# Patient Record
Sex: FEMALE | Race: WHITE | NOT HISPANIC OR LATINO | Employment: PART TIME | URBAN - METROPOLITAN AREA
[De-identification: names, ages, dates, MRNs, and addresses within clinical notes are randomized per-mention and may not be internally consistent; named-entity substitution may affect disease eponyms.]

---

## 2018-06-19 LAB
ANION GAP SERPL CALCULATED.3IONS-SCNC: 11.1 MM/L
BUN SERPL-MCNC: 17 MG/DL (ref 7–25)
CALCIUM SERPL-MCNC: 9.1 MG/DL (ref 8.6–10.5)
CHLORIDE SERPL-SCNC: 105 MM/L (ref 98–107)
CHOLEST SERPL-MCNC: 232 MG/DL (ref 0–200)
CO2 SERPL-SCNC: 28 MM/L (ref 21–31)
CREAT SERPL-MCNC: 0.82 MG/DL (ref 0.6–1.2)
EGFR (HISTORICAL): > 60 GFR
EGFR AFRICAN AMERICAN (HISTORICAL): > 60 GFR
GLUCOSE (HISTORICAL): 101 MG/DL (ref 65–99)
HDLC SERPL-MCNC: 65 MG/DL (ref 40–60)
LDLC SERPL CALC-MCNC: 151.6 MG/DL (ref 75–193)
OSMOLALITY, SERUM (HISTORICAL): 281 MOSM (ref 262–291)
POTASSIUM SERPL-SCNC: 4.1 MM/L (ref 3.5–5.5)
SODIUM SERPL-SCNC: 140 MM/L (ref 134–143)
TRIGL SERPL-MCNC: 75 MG/DL (ref 44–166)
TSH SERPL DL<=0.05 MIU/L-ACNC: 2.03 UIU/M (ref 0.45–5.33)
VLDL CHOLESTEROL (HISTORICAL): 15 MG/DL (ref 5–51)

## 2018-07-13 PROBLEM — N15.9 CHRONIC KIDNEY INFECTION: Status: ACTIVE | Noted: 2018-07-13

## 2018-07-13 PROBLEM — R01.1 HEART MURMUR: Status: ACTIVE | Noted: 2018-07-13

## 2018-07-13 PROBLEM — F32.A MILD DEPRESSION: Status: ACTIVE | Noted: 2018-07-13

## 2018-07-13 RX ORDER — LEVOTHYROXINE SODIUM 0.05 MG/1
50 TABLET ORAL DAILY
COMMUNITY
End: 2018-07-30 | Stop reason: SDUPTHER

## 2018-07-13 RX ORDER — LISINOPRIL 5 MG/1
10 TABLET ORAL DAILY
COMMUNITY
End: 2018-07-30 | Stop reason: SDUPTHER

## 2018-07-30 ENCOUNTER — OFFICE VISIT (OUTPATIENT)
Dept: FAMILY MEDICINE CLINIC | Facility: CLINIC | Age: 58
End: 2018-07-30
Payer: COMMERCIAL

## 2018-07-30 VITALS
SYSTOLIC BLOOD PRESSURE: 124 MMHG | HEART RATE: 77 BPM | BODY MASS INDEX: 27.96 KG/M2 | OXYGEN SATURATION: 98 % | TEMPERATURE: 97.5 F | HEIGHT: 63 IN | DIASTOLIC BLOOD PRESSURE: 86 MMHG | WEIGHT: 157.8 LBS | RESPIRATION RATE: 16 BRPM

## 2018-07-30 DIAGNOSIS — E78.5 HYPERLIPIDEMIA, UNSPECIFIED HYPERLIPIDEMIA TYPE: ICD-10-CM

## 2018-07-30 DIAGNOSIS — I10 HYPERTENSION, UNSPECIFIED TYPE: Primary | ICD-10-CM

## 2018-07-30 DIAGNOSIS — E03.9 HYPOTHYROIDISM, UNSPECIFIED TYPE: ICD-10-CM

## 2018-07-30 PROBLEM — R01.1 HEART MURMUR: Status: RESOLVED | Noted: 2018-07-13 | Resolved: 2018-07-30

## 2018-07-30 PROBLEM — N15.9 CHRONIC KIDNEY INFECTION: Status: RESOLVED | Noted: 2018-07-13 | Resolved: 2018-07-30

## 2018-07-30 PROBLEM — F32.A MILD DEPRESSION: Status: RESOLVED | Noted: 2018-07-13 | Resolved: 2018-07-30

## 2018-07-30 PROBLEM — E03.8 OTHER SPECIFIED HYPOTHYROIDISM: Status: ACTIVE | Noted: 2018-07-30

## 2018-07-30 PROCEDURE — 3074F SYST BP LT 130 MM HG: CPT | Performed by: INTERNAL MEDICINE

## 2018-07-30 PROCEDURE — 99213 OFFICE O/P EST LOW 20 MIN: CPT | Performed by: INTERNAL MEDICINE

## 2018-07-30 PROCEDURE — 3079F DIAST BP 80-89 MM HG: CPT | Performed by: INTERNAL MEDICINE

## 2018-07-30 PROCEDURE — 3008F BODY MASS INDEX DOCD: CPT | Performed by: INTERNAL MEDICINE

## 2018-07-30 RX ORDER — LISINOPRIL 10 MG/1
10 TABLET ORAL DAILY
Qty: 90 TABLET | Refills: 3 | Status: SHIPPED | OUTPATIENT
Start: 2018-07-30 | End: 2019-10-14 | Stop reason: SDUPTHER

## 2018-07-30 RX ORDER — LEVOTHYROXINE SODIUM 0.05 MG/1
50 TABLET ORAL DAILY
Qty: 90 TABLET | Refills: 3 | Status: SHIPPED | OUTPATIENT
Start: 2018-07-30 | End: 2019-10-14 | Stop reason: SDUPTHER

## 2018-07-30 NOTE — PROGRESS NOTES
Assessment/Plan:  Hypertension   Stable  Hyperlipidemia  On diet       Diagnoses and all orders for this visit:    Hypertension, unspecified type  -     lisinopril (ZESTRIL) 10 mg tablet; Take 1 tablet (10 mg total) by mouth daily  -     Basic metabolic panel; Future    Hypothyroidism, unspecified type  -     levothyroxine 50 mcg tablet; Take 1 tablet (50 mcg total) by mouth daily  -     TSH, 3rd generation with Free T4 reflex; Future    Hyperlipidemia, unspecified hyperlipidemia type  -     Lipid panel; Future          Subjective:      Patient ID: Suzan Clifton is a 59 y o  female  HPI    The following portions of the patient's history were reviewed and updated as appropriate: She  has a past medical history of Depression and Murmur  Patient Active Problem List    Diagnosis Date Noted    Essential hypertension 07/30/2018    Other specified hypothyroidism 07/30/2018     She  has a past surgical history that includes Tonsillectomy and Appendectomy  Her family history includes Diabetes in her mother; Heart disease in her father and mother  She  reports that she quit smoking about 30 years ago  She has never used smokeless tobacco  She reports that she does not drink alcohol or use drugs  Current Outpatient Prescriptions   Medication Sig Dispense Refill    levothyroxine 50 mcg tablet Take 1 tablet (50 mcg total) by mouth daily 90 tablet 3    lisinopril (ZESTRIL) 10 mg tablet Take 1 tablet (10 mg total) by mouth daily 90 tablet 3     No current facility-administered medications for this visit  Current Outpatient Prescriptions on File Prior to Visit   Medication Sig    [DISCONTINUED] levothyroxine 50 mcg tablet Take 50 mcg by mouth daily    [DISCONTINUED] lisinopril (ZESTRIL) 5 mg tablet Take 10 mg by mouth daily       No current facility-administered medications on file prior to visit  She has No Known Allergies       Review of Systems   Constitutional: Negative  HENT: Negative  Eyes: Negative  Respiratory: Negative  Cardiovascular: Negative  Gastrointestinal: Negative  Endocrine: Negative  Genitourinary: Negative  Musculoskeletal: Negative  Skin: Negative  Allergic/Immunologic: Negative  Neurological: Negative  Hematological: Negative  Psychiatric/Behavioral: Negative  Objective:      /86   Pulse 77   Temp 97 5 °F (36 4 °C) (Tympanic)   Resp 16   Ht 5' 3" (1 6 m)   Wt 71 6 kg (157 lb 12 8 oz)   SpO2 98%   BMI 27 95 kg/m²          Physical Exam   Constitutional: She is oriented to person, place, and time  She appears well-developed and well-nourished  HENT:   Head: Normocephalic and atraumatic  Eyes: Pupils are equal, round, and reactive to light  Neck: Normal range of motion  Neck supple  No JVD present  No thyromegaly present  Cardiovascular: Normal rate, regular rhythm and normal heart sounds  Exam reveals no friction rub  No murmur heard  Pulmonary/Chest: Effort normal and breath sounds normal  No respiratory distress  She has no wheezes  She has no rales  Abdominal: Soft  Bowel sounds are normal  She exhibits no mass  There is no tenderness  There is no rebound  Musculoskeletal: Normal range of motion  She exhibits no edema or tenderness  Lymphadenopathy:     She has no cervical adenopathy  Neurological: She is alert and oriented to person, place, and time  She has normal reflexes  Skin: Skin is warm and dry  Psychiatric: She has a normal mood and affect  Orders Only on 06/19/2018   Component Date Value Ref Range Status    Glucose 06/19/2018 101* 65 - 99 MG/DL Final    Comment: ADA fasting glucose recommendations:   Normal: 65-99; Impaired: 100-125;  Diagnostic for Diabetes mellitus: > 125; Target for Diabetes mellitus:       BUN 06/19/2018 17  7 - 25 MG/DL Final    Creatinine 06/19/2018 0 82  0 6 - 1 2 MG/DL Final    Comment: IDMS method performed  The drugs Metamizole, Sulfasalazine, and Sulfapyridine may interfere and  result in false low results   Sodium 06/19/2018 140  134 - 143 MM/L Final    Please note: Reference range change based on patient population   Potassium 06/19/2018 4 1  3 5 - 5 5 MM/L Final    Please note: Reference range change based on patient population   Chloride 06/19/2018 105  98 - 107 MM/L Final    CO2 06/19/2018 28  21 0 - 31 0 MM/L Final    Calcium 06/19/2018 9 1  8 6 - 10 5 MG/DL Final    Please note: Reference range change based on patient population   Anion Gap 06/19/2018 11 1  MM/L Final    OSMOLALITY, SERUM 06/19/2018 281  262 - 291 mOsm Final    EGFR (HISTORICAL) 06/19/2018 > 60  >60 GFR Final    Comment: This calculation follows the MDRD guidelines  Units are in mL/min/1 73 sq meters      eGFR African American 06/19/2018 > 60  >60 GFR Final    Cholesterol 06/19/2018 232* 0 - 200 MG/DL Final    Comment: <200----------------------------DESIRABLE  200 - 239---------------------BORDERLINE HIGH  240 OR GREATER-----HIGH  The drugs Metamizole, Sulfasalazine, and Sulfapyridine may interfere and  result in false low results   Triglycerides 06/19/2018 75  44 - 166 MG/DL Final    Comment: The drugs Metamizole, Sulfasalazine, and Sulfapyridine may interfere and  result in false low results   VLDL CHOLESTEROL 06/19/2018 15 0  5 - 51 MG/DL Final    HDL 06/19/2018 65* 40 - 60 MG/DL Final    Comment: The drugs Metamizole, Sulfasalazine, and Sulfapyridine may interfere and  result in false low results   LDL Calculated 06/19/2018 151 6  75 - 193 MG/DL Final    TSH 3RD GENERATON 06/19/2018 2 03  0 45 - 5 33 UIU/M Final    Comment: Pregnant Females Reference Range  1st Trimester: 0 05-3 70  2nd Trimester: 0 31-4 35  3rd Trimester: 0 41-5 18         No results found

## 2018-10-08 ENCOUNTER — ANNUAL EXAM (OUTPATIENT)
Dept: OBGYN CLINIC | Facility: CLINIC | Age: 58
End: 2018-10-08
Payer: COMMERCIAL

## 2018-10-08 VITALS
WEIGHT: 157.4 LBS | SYSTOLIC BLOOD PRESSURE: 120 MMHG | DIASTOLIC BLOOD PRESSURE: 84 MMHG | BODY MASS INDEX: 27.89 KG/M2 | HEIGHT: 63 IN

## 2018-10-08 DIAGNOSIS — Z01.411 ENCNTR FOR GYN EXAM (GENERAL) (ROUTINE) W ABNORMAL FINDINGS: Primary | ICD-10-CM

## 2018-10-08 DIAGNOSIS — Z12.31 ENCOUNTER FOR SCREENING MAMMOGRAM FOR BREAST CANCER: ICD-10-CM

## 2018-10-08 PROCEDURE — 99396 PREV VISIT EST AGE 40-64: CPT | Performed by: OBSTETRICS & GYNECOLOGY

## 2018-10-08 NOTE — PATIENT INSTRUCTIONS
Breast Self Exam for Women   WHAT YOU NEED TO KNOW:   A BSE is a way to check your breasts for lumps and other changes  Regular BSEs can help you know how your breasts normally look and feel  Most breast lumps or changes are not cancer, but you should always have them checked by a healthcare provider  Your healthcare provider can also watch you do a BSE and can tell you if you are doing your BSE correctly  DISCHARGE INSTRUCTIONS:   Contact your healthcare provider if:   · You find any lumps or changes in your breasts  · You have breast pain or fluid coming from your nipples  · You have questions or concerns about your condition or care  Why you should do a BSE:  Breast cancer is the most common type of cancer in women  Even if you have mammograms, you may still want to do a BSE regularly  If you know how your breasts normally feel and look, it may help you know when to contact your healthcare provider  Mammograms can miss some cancers  You may find a lump during a BSE that did not show up on your mammogram   When you should do a BSE:  Lizy Mays your calendar to help you remember to do BSE on a regular schedule  One easy way to remember to do a BSE is to do the exam on the same day of each month  If you have periods, you may want to do your BSE 1 week after your period ends  This is the time when your breasts may be the least swollen, lumpy, or tender  You can do regular BSEs even if you are breastfeeding or have breast implants  How to do a BSE:   · Look at your breasts in a mirror  Look at the size and shape of each breast and nipple  Check for swelling, lumps, dimpling, scaly skin, or other skin changes  Look for nipple changes, such as a nipple that is painful or beginning to pull inward  Gently squeeze both nipples and check to see if fluid (that is not breast milk) comes out of them  If you find any of these or other breast changes, contact your healthcare provider   Check your breasts while you sit or  the following 3 positions:    [facility] your arms down at your sides  ¨ Raise your hands and join them behind your head  ¨ Put firm pressure with your hands on your hips  Bend slightly forward while you look at your breasts in the mirror  · Lie down and feel your breasts  When you lie down, your breast tissue spreads out evenly over your chest  This makes it easier for you to feel for lumps and anything that may not be normal for your breasts  Do a BSE on one breast at a time  ¨ Place a small pillow or towel under your left shoulder  Put your left arm behind your head  ¨ Use the 3 middle fingers of your right hand  Use your fingertip pads, on the top of your fingers  Your fingertip pad is the most sensitive part of your finger  ¨ Use small circles to feel your breast tissue  Use your fingertip pads to make dime-sized, overlapping circles on your breast and armpits  Use light, medium, and firm pressure  First, press lightly  Second, press with medium pressure to feel a little deeper into the breast  Last, use firm pressure to feel deep within your breast     ¨ Examine your entire breast area  Examine the breast area from above the breast to below the breast where you feel only ribs  Make small circles with your fingertips, starting in the middle of your armpit  Make circles going up and down the breast area  Continue toward your breast and all the way across it  Examine the area from your armpit all the way over to the middle of your chest (breastbone)  Stop at the middle of your chest     ¨ Move the pillow or towel to your right shoulder, and put your right arm behind your head  Use the 3 fingertip pads of your left hand, and repeat the above steps to do a BSE on your right breast        What else you can do to check for breast problems or cancer:  Some experts suggest that women 36years of age or older should have a mammogram every year   Other experts suggest that women between the ages of 48and 76years old should have a mammogram every 2 years  Talk to your healthcare provider about when you should have a mammogram   Follow up with your healthcare provider as directed: Your healthcare provider can watch you and tell you if you are doing your BSE correctly  Write down your questions so you remember to ask them during your visits  © 2017 [employer] Information is for End User's use only and may not be sold, redistributed or otherwise used for commercial purposes  All illustrations and images included in CareNotes® are the copyrighted property of [employer]  or [employer]  The above information is an  only  It is not intended as medical advice for individual conditions or treatments  Talk to your doctor, nurse or pharmacist before following any medical regimen to see if it is safe and effective for you  Wellness Visit for Adults   AMBULATORY CARE:   A wellness visit  is when you see your healthcare provider to get screened for health problems  You can also get advice on how to stay healthy  Write down your questions so you remember to ask them  Ask your healthcare provider how often you should have a wellness visit  What happens at a wellness visit:  Your healthcare provider will ask about your health, and your family history of health problems  This includes high blood pressure, heart disease, and cancer  He or she will ask if you have symptoms that concern you, if you smoke, and about your mood  You may also be asked about your intake of medicines, supplements, food, and alcohol  Any of the following may be done:  · Your weight  will be checked  Your height may also be checked so your body mass index (BMI) can be calculated  Your BMI shows if you are at a healthy weight  · Your blood pressure  and heart rate will be checked  Your temperature may also be checked  · Blood and urine tests  may be done   Blood tests may be done to check your cholesterol levels  Abnormal cholesterol levels increase your risk for heart disease and stroke  You may also need a blood or urine test to check for diabetes if you are at increased risk  Urine tests may be done to look for signs of an infection or kidney disease  · A physical exam  includes checking your heartbeat and lungs with a stethoscope  Your healthcare provider may also check your skin to look for sun damage  · Screening tests  may be recommended  A screening test is done to check for diseases that may not cause symptoms  The screening tests you may need depend on your age, gender, family history, and lifestyle habits  For example, colorectal screening may be recommended if you are 48years old or older  Screening tests you need if you are a woman:   · A Pap smear  is used to screen for cervical cancer  Pap smears are usually done every 3 to 5 years depending on your age  You may need them more often if you have had abnormal Pap smear test results in the past  Ask your healthcare provider how often you should have a Pap smear  · A mammogram  is an x-ray of your breasts to screen for breast cancer  Experts recommend mammograms every 2 years starting at age 48 years  You may need a mammogram at age 52 years or younger if you have an increased risk for breast cancer  Talk to your healthcare provider about when you should start having mammograms and how often you need them  Vaccines you may need:   · Get an influenza vaccine  every year  The influenza vaccine protects you from the flu  Several types of viruses cause the flu  The viruses change over time, so new vaccines are made each year  · Get a tetanus-diphtheria (Td) booster vaccine  every 10 years  This vaccine protects you against tetanus and diphtheria  Tetanus is a severe infection that may cause painful muscle spasms and lockjaw   Diphtheria is a severe bacterial infection that causes a thick covering in the back of your mouth and throat  · Get a human papillomavirus (HPV) vaccine  if you are female and aged 23 to 32 or male 23 to 24 and never received it  This vaccine protects you from HPV infection  HPV is the most common infection spread by sexual contact  HPV may also cause vaginal, penile, and anal cancers  · Get a pneumococcal vaccine  if you are aged 72 years or older  The pneumococcal vaccine is an injection given to protect you from pneumococcal disease  Pneumococcal disease is an infection caused by pneumococcal bacteria  The infection may cause pneumonia, meningitis, or an ear infection  · Get a shingles vaccine  if you are aged 61 or older, even if you have had shingles before  The shingles vaccine is an injection to protect you from the varicella-zoster virus  This is the same virus that causes chickenpox  Shingles is a painful rash that develops in people who had chickenpox or have been exposed to the virus  How to eat healthy:  My Plate is a model for planning healthy meals  It shows the types and amounts of foods that should go on your plate  Fruits and vegetables make up about half of your plate, and grains and protein make up the other half  A serving of dairy is included on the side of your plate  The amount of calories and serving sizes you need depends on your age, gender, weight, and height  Examples of healthy foods are listed below:  · Eat a variety of vegetables  such as dark green, red, and orange vegetables  You can also include canned vegetables low in sodium (salt) and frozen vegetables without added butter or sauces  · Eat a variety of fresh fruits , canned fruit in 100% juice, frozen fruit, and dried fruit  · Include whole grains  At least half of the grains you eat should be whole grains   Examples include whole-wheat bread, wheat pasta, brown rice, and whole-grain cereals such as oatmeal     · Eat a variety of protein foods such as seafood (fish and shellfish), lean meat, and poultry without skin (turkey and chicken)  Examples of lean meats include pork leg, shoulder, or tenderloin, and beef round, sirloin, tenderloin, and extra lean ground beef  Other protein foods include eggs and egg substitutes, beans, peas, soy products, nuts, and seeds  · Choose low-fat dairy products such as skim or 1% milk or low-fat yogurt, cheese, and cottage cheese  · Limit unhealthy fats  such as butter, hard margarine, and shortening  Exercise:  Exercise at least 30 minutes per day on most days of the week  Some examples of exercise include walking, biking, dancing, and swimming  You can also fit in more physical activity by taking the stairs instead of the elevator or parking farther away from stores  Include muscle strengthening activities 2 days each week  Regular exercise provides many health benefits  It helps you manage your weight, and decreases your risk for type 2 diabetes, heart disease, stroke, and high blood pressure  Exercise can also help improve your mood  Ask your healthcare provider about the best exercise plan for you  General health and safety guidelines:   · Do not smoke  Nicotine and other chemicals in cigarettes and cigars can cause lung damage  Ask your healthcare provider for information if you currently smoke and need help to quit  E-cigarettes or smokeless tobacco still contain nicotine  Talk to your healthcare provider before you use these products  · Limit alcohol  A drink of alcohol is 12 ounces of beer, 5 ounces of wine, or 1½ ounces of liquor  · Lose weight, if needed  Being overweight increases your risk of certain health conditions  These include heart disease, high blood pressure, type 2 diabetes, and certain types of cancer  · Protect your skin  Do not sunbathe or use tanning beds  Use sunscreen with a SPF 15 or higher  Apply sunscreen at least 15 minutes before you go outside  Reapply sunscreen every 2 hours   Wear protective clothing, hats, and sunglasses when you are outside  · Drive safely  Always wear your seatbelt  Make sure everyone in your car wears a seatbelt  A seatbelt can save your life if you are in an accident  Do not use your cell phone when you are driving  This could distract you and cause an accident  Pull over if you need to make a call or send a text message  · Practice safe sex  Use latex condoms if are sexually active and have more than one partner  Your healthcare provider may recommend screening tests for sexually transmitted infections (STIs)  · Wear helmets, lifejackets, and protective gear  Always wear a helmet when you ride a bike or motorcycle, go skiing, or play sports that could cause a head injury  Wear protective equipment when you play sports  Wear a lifejacket when you are on a boat or doing water sports  © 2017 [employer]  Information is for End User's use only and may not be sold, redistributed or otherwise used for commercial purposes  All illustrations and images included in CareNotes® are the copyrighted property of [employer]  or [employer]bin Gomez  The above information is an  only  It is not intended as medical advice for individual conditions or treatments  Talk to your doctor, nurse or pharmacist before following any   medical regimen to see if it is safe and effective for you  Vaginal Atrophy   AMBULATORY CARE:   Vaginal atrophy  is a condition that causes thinning, drying, and inflammation of vaginal tissue  This condition is caused by decreased levels of estrogen (a female sex hormone)  Vaginal atrophy can increase your risk for vaginal and urinary tract infections  Vaginal atrophy can worsen over time if not treated     Common signs and symptoms include the following:   · Vaginal dryness, itching, and burning    · Vaginal discharge    · Pain or discomfort during sex    · Light bleeding after sex    · Burning during urination    · Frequent, sudden, strong urges to urinate    · Urinary incontinence (loss of control of your bladder)  Contact your healthcare provider if:   · You have a foul-smelling odor coming from your vagina  · You have a thick, cheese-like discharge from your vagina  · You have itching, swelling, or redness in your vagina  · You have pain or burning when you urinate  · Your urine smells bad  · Your symptoms do not improve, or they get worse  · You have questions or concerns about your condition or care  Treatment:   · Over-the counter vaginal moisturizers  can help reduce dryness  Your healthcare provider may recommend that you use a vaginal moisturizer several times each week and during sex  Only use creams that are made for vaginal use  Do  not  use petroleum jelly  Lubricants can be used during sex to decrease pain and discomfort  · Estrogen  may help decrease dryness  It may also lower your risk of vaginal infections if you are going through menopause  It can also help to relieve urinary symptoms  Estrogen may be prescribed in the form of a cream, tablet, or ring  These medicines can be applied or inserted into the vagina  Estrogen can also be prescribed in the form of a pill  Follow up with your healthcare provider as directed:  Write down your questions so you remember to ask them during your visits  © 2017 [facility] [facility] Information is for End User's use only and may not be sold, redistributed or otherwise used for commercial purposes  All illustrations and images included in CareNotes® are the copyrighted property of [employer]  or [employer] Gomez  The above information is an  only  It is not intended as medical advice for individual conditions or treatments  Talk to your doctor, nurse or pharmacist before following any medical regimen to see if it is safe and effective for you

## 2018-10-08 NOTE — PROGRESS NOTES
Assessment        Diagnoses and all orders for this visit:    Encntr for gyn exam (general) (routine) w abnormal findings    Encounter for screening mammogram for breast cancer  -     Mammo screening bilateral w cad; Future    Other orders  -     Multiple Vitamins-Minerals (CENTRUM SILVER 50+WOMEN PO); Take by mouth  -     Apoaequorin (PREVAGEN PO); Take by mouth                  Plan      All questions answered  Breast self exam technique reviewed and patient encouraged to perform self-exam monthly  Discussed healthy lifestyle modifications  Educational material distributed  Follow up in 1 year  Mammogram   PAP deferred       Subjective      Sadi Sender is a 59 y o  female who presents for annual exam       Last PAP: 2016  Last Mammo:  3/31/2016  Colonoscopy: done, will [facility]          Menstrual History:  OB History      Para Term  AB Living    1 0 0 0 1 0    SAB TAB Ectopic Multiple Live Births    1 0   0 0        No LMP recorded  Patient is postmenopausal        The following portions of the patient's history were reviewed and updated as appropriate: allergies, current medications, past family history, past medical history, past social history, past surgical history and problem list         Review of Systems   Constitutional: Negative  HENT: Negative  Eyes: Negative  Respiratory: Negative  Cardiovascular: Negative  Gastrointestinal: Negative  Endocrine: Negative  Genitourinary:        As noted in HPI   Musculoskeletal: Negative  Skin: Negative  Allergic/Immunologic: Negative  Neurological: Negative  Hematological: Negative  Psychiatric/Behavioral: Negative  Physical Exam   Constitutional: She is oriented to person, place, and time  She appears well-developed and well-nourished  Genitourinary: There is no rash or lesion on the right labia  There is no rash or lesion on the left labia  No bleeding in the vagina   No vaginal discharge found  Right adnexum does not display mass, does not display tenderness and does not display fullness  Left adnexum does not display mass, does not display tenderness and does not display fullness  Cervix does not exhibit motion tenderness, lesion or polyp  Uterus is not enlarged or tender  HENT:   Head: Normocephalic  Neck: No thyromegaly present  Cardiovascular: Normal rate, regular rhythm and normal heart sounds  No murmur heard  Pulmonary/Chest: Effort normal and breath sounds normal  No respiratory distress  She has no wheezes  She has no rales  Right breast exhibits no mass, no nipple discharge, no skin change and no tenderness  Left breast exhibits no mass, no nipple discharge, no skin change and no tenderness  Abdominal: Soft  She exhibits no distension and no mass  There is no tenderness  There is no rebound and no guarding  Musculoskeletal: She exhibits no edema or tenderness  Neurological: She is alert and oriented to person, place, and time  Skin: Skin is warm and dry  Psychiatric: She has a normal mood and affect         Moderate vaginal atrophy

## 2019-01-19 ENCOUNTER — APPOINTMENT (OUTPATIENT)
Dept: LAB | Facility: HOSPITAL | Age: 59
End: 2019-01-19
Attending: INTERNAL MEDICINE
Payer: COMMERCIAL

## 2019-01-19 DIAGNOSIS — E03.9 HYPOTHYROIDISM, UNSPECIFIED TYPE: ICD-10-CM

## 2019-01-19 DIAGNOSIS — I10 HYPERTENSION, UNSPECIFIED TYPE: ICD-10-CM

## 2019-01-19 DIAGNOSIS — E78.5 HYPERLIPIDEMIA, UNSPECIFIED HYPERLIPIDEMIA TYPE: ICD-10-CM

## 2019-01-19 LAB
ANION GAP SERPL CALCULATED.3IONS-SCNC: 5 MMOL/L (ref 4–13)
BUN SERPL-MCNC: 15 MG/DL (ref 7–25)
CALCIUM SERPL-MCNC: 9.1 MG/DL (ref 8.6–10.5)
CHLORIDE SERPL-SCNC: 104 MMOL/L (ref 98–107)
CHOLEST SERPL-MCNC: 216 MG/DL (ref 0–200)
CO2 SERPL-SCNC: 31 MMOL/L (ref 21–31)
CREAT SERPL-MCNC: 0.74 MG/DL (ref 0.6–1.2)
GFR SERPL CREATININE-BSD FRML MDRD: 88 ML/MIN/1.73SQ M
GLUCOSE P FAST SERPL-MCNC: 89 MG/DL (ref 65–99)
HDLC SERPL-MCNC: 67 MG/DL (ref 40–60)
LDLC SERPL CALC-MCNC: 125 MG/DL (ref 75–193)
NONHDLC SERPL-MCNC: 149 MG/DL
POTASSIUM SERPL-SCNC: 4.3 MMOL/L (ref 3.5–5.5)
SODIUM SERPL-SCNC: 140 MMOL/L (ref 134–143)
TRIGL SERPL-MCNC: 119 MG/DL (ref 44–166)
TSH SERPL DL<=0.05 MIU/L-ACNC: 1.75 UIU/ML (ref 0.45–5.33)

## 2019-01-19 PROCEDURE — 36415 COLL VENOUS BLD VENIPUNCTURE: CPT

## 2019-01-19 PROCEDURE — 80061 LIPID PANEL: CPT

## 2019-01-19 PROCEDURE — 80048 BASIC METABOLIC PNL TOTAL CA: CPT

## 2019-01-19 PROCEDURE — 84443 ASSAY THYROID STIM HORMONE: CPT

## 2019-01-21 ENCOUNTER — OFFICE VISIT (OUTPATIENT)
Dept: FAMILY MEDICINE CLINIC | Facility: CLINIC | Age: 59
End: 2019-01-21
Payer: COMMERCIAL

## 2019-01-21 VITALS
OXYGEN SATURATION: 98 % | RESPIRATION RATE: 16 BRPM | WEIGHT: 166 LBS | BODY MASS INDEX: 29.41 KG/M2 | DIASTOLIC BLOOD PRESSURE: 86 MMHG | SYSTOLIC BLOOD PRESSURE: 132 MMHG | HEIGHT: 63 IN | HEART RATE: 98 BPM | TEMPERATURE: 99.4 F

## 2019-01-21 DIAGNOSIS — Z12.39 ENCOUNTER FOR BREAST CANCER SCREENING OTHER THAN MAMMOGRAM: Primary | ICD-10-CM

## 2019-01-21 DIAGNOSIS — Z11.59 ENCOUNTER FOR HEPATITIS C SCREENING TEST FOR LOW RISK PATIENT: ICD-10-CM

## 2019-01-21 DIAGNOSIS — Z12.11 COLON CANCER SCREENING: ICD-10-CM

## 2019-01-21 DIAGNOSIS — E78.5 HYPERLIPIDEMIA, UNSPECIFIED HYPERLIPIDEMIA TYPE: ICD-10-CM

## 2019-01-21 DIAGNOSIS — I10 ESSENTIAL HYPERTENSION: ICD-10-CM

## 2019-01-21 PROCEDURE — 3008F BODY MASS INDEX DOCD: CPT | Performed by: INTERNAL MEDICINE

## 2019-01-21 PROCEDURE — 1036F TOBACCO NON-USER: CPT | Performed by: INTERNAL MEDICINE

## 2019-01-21 PROCEDURE — 3075F SYST BP GE 130 - 139MM HG: CPT | Performed by: INTERNAL MEDICINE

## 2019-01-21 PROCEDURE — 3079F DIAST BP 80-89 MM HG: CPT | Performed by: INTERNAL MEDICINE

## 2019-01-21 PROCEDURE — 99213 OFFICE O/P EST LOW 20 MIN: CPT | Performed by: INTERNAL MEDICINE

## 2019-01-21 NOTE — PROGRESS NOTES
Assessment/Plan:  Hypertension stable on current treatment with lisinopril 10 mg daily  2   Hyperlipidemia improving on diet recommend continued exercise  3   Hypothyroidism on replacement TSH is 1 7 will continue current medication of 50 mcg Synthroid  4   Preventive health screening recommended includ including hepatitis C screening, mammography, and a follow-up colonoscopy  Patient referred to GI for screening colonoscopy previous colonoscopy is probably more than 5 years  Recheck and follow-up 6         Diagnoses and all orders for this visit:    Encounter for breast cancer screening other than mammogram  -     Mammo screening bilateral w 3d & cad; Future    Colon cancer screening  -     Ambulatory referral to Gastroenterology; Future    Encounter for hepatitis C screening test for low risk patient  -     Hepatitis C antibody; Future          Subjective:      Patient ID: Constanza Reinoso is a 62 y o  female  76-year-old female with history of hypertension currently on lisinopril 10 mg daily at blood pressure is satisfactory  2   History of hypothyroidism on replacement therapy with Synthroid 50 mcg once daily  3   History of hyperlipidemia patient on diet recent cholesterol is down to 216   Patient is nonsmoker  No history of coronary artery disease  The following portions of the patient's history were reviewed and updated as appropriate: She  has a past medical history of Depression and Murmur  Patient Active Problem List    Diagnosis Date Noted    Essential hypertension 07/30/2018    Other specified hypothyroidism 07/30/2018     She  has a past surgical history that includes Tonsillectomy; Appendectomy; and Cystoscopy  Her family history includes Diabetes in her mother; Heart disease in her father and mother  She  reports that she quit smoking about 30 years ago  She has never used smokeless tobacco  She reports that she does not drink alcohol or use drugs    Current Outpatient Prescriptions   Medication Sig Dispense Refill    levothyroxine 50 mcg tablet Take 1 tablet (50 mcg total) by mouth daily 90 tablet 3    lisinopril (ZESTRIL) 10 mg tablet Take 1 tablet (10 mg total) by mouth daily 90 tablet 3    Multiple Vitamins-Minerals (CENTRUM SILVER 50+WOMEN PO) Take by mouth       No current facility-administered medications for this visit  Current Outpatient Prescriptions on File Prior to Visit   Medication Sig    levothyroxine 50 mcg tablet Take 1 tablet (50 mcg total) by mouth daily    lisinopril (ZESTRIL) 10 mg tablet Take 1 tablet (10 mg total) by mouth daily    Multiple Vitamins-Minerals (CENTRUM SILVER 50+WOMEN PO) Take by mouth    [DISCONTINUED] Apoaequorin (PREVAGEN PO) Take by mouth     No current facility-administered medications on file prior to visit  She has No Known Allergies       Review of Systems   Constitutional: Negative  HENT: Negative  Eyes: Negative  Respiratory: Negative  Cardiovascular: Negative  Gastrointestinal: Negative  Endocrine: Negative  Genitourinary: Negative  Musculoskeletal: Negative  Skin: Negative  Allergic/Immunologic: Negative  Neurological: Negative  Hematological: Negative  Psychiatric/Behavioral: Negative  Objective:      /86   Pulse 98   Temp 99 4 °F (37 4 °C) (Tympanic)   Resp 16   Ht 5' 3" (1 6 m)   Wt 75 3 kg (166 lb)   SpO2 98%   BMI 29 41 kg/m²          Physical Exam   Constitutional: She is oriented to person, place, and time  She appears well-developed and well-nourished  HENT:   Head: Normocephalic and atraumatic  Eyes: Pupils are equal, round, and reactive to light  Neck: Normal range of motion  Neck supple  No JVD present  No thyromegaly present  Cardiovascular: Normal rate, regular rhythm and normal heart sounds  Exam reveals no friction rub  No murmur heard  Pulmonary/Chest: Effort normal and breath sounds normal  No respiratory distress   She has no wheezes  She has no rales  Abdominal: Soft  Bowel sounds are normal  She exhibits no mass  There is no tenderness  There is no rebound  Musculoskeletal: Normal range of motion  She exhibits no edema or tenderness  Lymphadenopathy:     She has no cervical adenopathy  Neurological: She is alert and oriented to person, place, and time  She has normal reflexes  Skin: Skin is warm and dry  Psychiatric: She has a normal mood and affect  Appointment on 01/19/2019   Component Date Value Ref Range Status    Sodium 01/19/2019 140  134 - 143 mmol/L Final    Potassium 01/19/2019 4 3  3 5 - 5 5 mmol/L Final    Chloride 01/19/2019 104  98 - 107 mmol/L Final    CO2 01/19/2019 31  21 - 31 mmol/L Final    ANION GAP 01/19/2019 5  4 - 13 mmol/L Final    BUN 01/19/2019 15  7 - 25 mg/dL Final    Creatinine 01/19/2019 0 74  0 60 - 1 20 mg/dL Final    Standardized to IDMS reference method    Glucose, Fasting 01/19/2019 89  65 - 99 mg/dL Final      Specimen collection should occur prior to Sulfasalazine administration due to the potential for falsely depressed results  Specimen collection should occur prior to Sulfapyridine administration due to the potential for falsely elevated results   Calcium 01/19/2019 9 1  8 6 - 10 5 mg/dL Final    eGFR 01/19/2019 88  ml/min/1 73sq m Final    Cholesterol 01/19/2019 216* 0 - 200 mg/dL Final      Cholesterol:       Desirable         <200 mg/dl       Borderline         200-239 mg/dl       High              >239           Triglycerides 01/19/2019 119  44 - 166 mg/dL Final      Triglyceride:     Normal          <150 mg/dl     Borderline High 150-199 mg/dl     High            200-499 mg/dl        Very High       >499 mg/dl    Specimen collection should occur prior to N-Acetylcysteine or Metamizole administration due to the potential for falsely depressed results      HDL, Direct 01/19/2019 67* 40 - 60 mg/dL Final      HDL Cholesterol:       High    >60 mg/dL       Low     <41 mg/dL  Specimen collection should occur prior to Metamizole administration due to the potential for falsley depressed results   LDL Calculated 01/19/2019 125  75 - 193 mg/dL Final      LDL Cholesterol:     Optimal           <100 mg/dl     Near Optimal      100-129 mg/dl     Above Optimal       Borderline High 130-159 mg/dl       High            160-189 mg/dl       Very High       >189 mg/dl         This screening LDL is a calculated result  It does not have the accuracy of the Direct Measured LDL in the monitoring of patients with hyperlipidemia and/or statin therapy  Direct Measure LDL (QZY068) must be ordered separately in these patients   Non-HDL-Chol (CHOL-HDL) 01/19/2019 149  mg/dl Final    TSH 3RD GENERATON 01/19/2019 1 750  0 450 - 5 330 uIU/mL Final    Using supplements with high doses of biotin 20 to more than 300 times greater than the adequate daily intake for adults of 30 mcg/day as established by the [employer], can cause falsely depress results  No results found

## 2019-02-18 ENCOUNTER — APPOINTMENT (OUTPATIENT)
Dept: LAB | Facility: HOSPITAL | Age: 59
End: 2019-02-18
Attending: INTERNAL MEDICINE
Payer: COMMERCIAL

## 2019-02-18 DIAGNOSIS — Z11.59 ENCOUNTER FOR HEPATITIS C SCREENING TEST FOR LOW RISK PATIENT: ICD-10-CM

## 2019-02-18 PROCEDURE — 86803 HEPATITIS C AB TEST: CPT

## 2019-02-18 PROCEDURE — 36415 COLL VENOUS BLD VENIPUNCTURE: CPT

## 2019-02-19 LAB — HCV AB SER QL: NORMAL

## 2019-06-17 ENCOUNTER — OFFICE VISIT (OUTPATIENT)
Dept: FAMILY MEDICINE CLINIC | Facility: CLINIC | Age: 59
End: 2019-06-17
Payer: COMMERCIAL

## 2019-06-17 VITALS
SYSTOLIC BLOOD PRESSURE: 122 MMHG | BODY MASS INDEX: 30.12 KG/M2 | HEART RATE: 73 BPM | RESPIRATION RATE: 18 BRPM | OXYGEN SATURATION: 99 % | TEMPERATURE: 99 F | WEIGHT: 170 LBS | HEIGHT: 63 IN | DIASTOLIC BLOOD PRESSURE: 78 MMHG

## 2019-06-17 DIAGNOSIS — I10 ESSENTIAL HYPERTENSION: Primary | ICD-10-CM

## 2019-06-17 DIAGNOSIS — Z12.39 SCREENING BREAST EXAMINATION: ICD-10-CM

## 2019-06-17 DIAGNOSIS — E03.8 OTHER SPECIFIED HYPOTHYROIDISM: ICD-10-CM

## 2019-06-17 PROCEDURE — 3078F DIAST BP <80 MM HG: CPT | Performed by: INTERNAL MEDICINE

## 2019-06-17 PROCEDURE — 99213 OFFICE O/P EST LOW 20 MIN: CPT | Performed by: INTERNAL MEDICINE

## 2019-06-17 PROCEDURE — 3008F BODY MASS INDEX DOCD: CPT | Performed by: INTERNAL MEDICINE

## 2019-06-17 PROCEDURE — 3074F SYST BP LT 130 MM HG: CPT | Performed by: INTERNAL MEDICINE

## 2019-08-15 ENCOUNTER — HOSPITAL ENCOUNTER (OUTPATIENT)
Dept: MAMMOGRAPHY | Facility: HOSPITAL | Age: 59
Discharge: HOME | End: 2019-08-15
Attending: INTERNAL MEDICINE
Payer: COMMERCIAL

## 2019-08-15 DIAGNOSIS — Z12.39 SCREENING BREAST EXAMINATION: ICD-10-CM

## 2019-08-15 PROCEDURE — 77067 SCR MAMMO BI INCL CAD: CPT

## 2019-08-15 PROCEDURE — 77063 BREAST TOMOSYNTHESIS BI: CPT

## 2019-10-14 DIAGNOSIS — I10 HYPERTENSION, UNSPECIFIED TYPE: ICD-10-CM

## 2019-10-14 DIAGNOSIS — E03.9 HYPOTHYROIDISM, UNSPECIFIED TYPE: ICD-10-CM

## 2019-10-14 RX ORDER — LISINOPRIL 10 MG/1
10 TABLET ORAL DAILY
Qty: 90 TABLET | Refills: 3 | Status: SHIPPED | OUTPATIENT
Start: 2019-10-14 | End: 2020-05-18 | Stop reason: SDUPTHER

## 2019-10-14 RX ORDER — LEVOTHYROXINE SODIUM 0.05 MG/1
50 TABLET ORAL DAILY
Qty: 90 TABLET | Refills: 3 | Status: SHIPPED | OUTPATIENT
Start: 2019-10-14 | End: 2020-05-18 | Stop reason: SDUPTHER

## 2019-10-14 NOTE — TELEPHONE ENCOUNTER
Patient stopped to get refills on her medications, was a patient of Dr Vianney Zimmerman but has follow up with Kathia Tobar on 10/24/35969 and is out of her medications   1) Levothyroxine 50mcg takes 1 tab daily by mouth   2) Lisinopril 10mg takes 1 tab daily by mouth  Usually gets a #90 day supply with 2 refills called into [facility]

## 2020-05-18 ENCOUNTER — OFFICE VISIT (OUTPATIENT)
Dept: FAMILY MEDICINE CLINIC | Facility: CLINIC | Age: 60
End: 2020-05-18
Payer: COMMERCIAL

## 2020-05-18 VITALS
HEIGHT: 63 IN | RESPIRATION RATE: 20 BRPM | SYSTOLIC BLOOD PRESSURE: 128 MMHG | HEART RATE: 80 BPM | BODY MASS INDEX: 25.87 KG/M2 | TEMPERATURE: 98.7 F | DIASTOLIC BLOOD PRESSURE: 70 MMHG | WEIGHT: 146 LBS | OXYGEN SATURATION: 99 %

## 2020-05-18 DIAGNOSIS — E78.5 HYPERLIPIDEMIA, UNSPECIFIED HYPERLIPIDEMIA TYPE: ICD-10-CM

## 2020-05-18 DIAGNOSIS — Z23 NEED FOR VACCINATION: ICD-10-CM

## 2020-05-18 DIAGNOSIS — I10 HYPERTENSION, UNSPECIFIED TYPE: ICD-10-CM

## 2020-05-18 DIAGNOSIS — Z00.00 ANNUAL PHYSICAL EXAM: Primary | ICD-10-CM

## 2020-05-18 DIAGNOSIS — E03.9 HYPOTHYROIDISM, UNSPECIFIED TYPE: ICD-10-CM

## 2020-05-18 DIAGNOSIS — E66.3 OVERWEIGHT WITH BODY MASS INDEX (BMI) OF 25 TO 25.9 IN ADULT: ICD-10-CM

## 2020-05-18 DIAGNOSIS — Z20.822 EXPOSURE TO COVID-19 VIRUS: ICD-10-CM

## 2020-05-18 PROCEDURE — 99396 PREV VISIT EST AGE 40-64: CPT | Performed by: NURSE PRACTITIONER

## 2020-05-18 PROCEDURE — 3078F DIAST BP <80 MM HG: CPT | Performed by: NURSE PRACTITIONER

## 2020-05-18 PROCEDURE — 3008F BODY MASS INDEX DOCD: CPT | Performed by: NURSE PRACTITIONER

## 2020-05-18 PROCEDURE — 99214 OFFICE O/P EST MOD 30 MIN: CPT | Performed by: NURSE PRACTITIONER

## 2020-05-18 PROCEDURE — 1036F TOBACCO NON-USER: CPT | Performed by: NURSE PRACTITIONER

## 2020-05-18 PROCEDURE — 3008F BODY MASS INDEX DOCD: CPT | Performed by: ORTHOPAEDIC SURGERY

## 2020-05-18 PROCEDURE — 3074F SYST BP LT 130 MM HG: CPT | Performed by: NURSE PRACTITIONER

## 2020-05-18 RX ORDER — LEVOTHYROXINE SODIUM 0.05 MG/1
50 TABLET ORAL DAILY
Qty: 90 TABLET | Refills: 3 | Status: SHIPPED | OUTPATIENT
Start: 2020-05-18 | End: 2021-02-11 | Stop reason: SDUPTHER

## 2020-05-18 RX ORDER — LISINOPRIL 10 MG/1
10 TABLET ORAL DAILY
Qty: 90 TABLET | Refills: 3 | Status: SHIPPED | OUTPATIENT
Start: 2020-05-18 | End: 2021-09-21 | Stop reason: SDUPTHER

## 2020-05-19 PROCEDURE — 90471 IMMUNIZATION ADMIN: CPT

## 2020-05-19 PROCEDURE — 90750 HZV VACC RECOMBINANT IM: CPT

## 2020-06-17 ENCOUNTER — OFFICE VISIT (OUTPATIENT)
Dept: URGENT CARE | Facility: CLINIC | Age: 60
End: 2020-06-17
Payer: COMMERCIAL

## 2020-06-17 VITALS
DIASTOLIC BLOOD PRESSURE: 80 MMHG | TEMPERATURE: 98.3 F | WEIGHT: 148 LBS | HEART RATE: 94 BPM | BODY MASS INDEX: 26.22 KG/M2 | HEIGHT: 63 IN | OXYGEN SATURATION: 98 % | SYSTOLIC BLOOD PRESSURE: 120 MMHG | RESPIRATION RATE: 18 BRPM

## 2020-06-17 DIAGNOSIS — M25.512 ACUTE PAIN OF LEFT SHOULDER: Primary | ICD-10-CM

## 2020-06-17 PROCEDURE — S9088 SERVICES PROVIDED IN URGENT: HCPCS | Performed by: NURSE PRACTITIONER

## 2020-06-17 PROCEDURE — 99213 OFFICE O/P EST LOW 20 MIN: CPT | Performed by: NURSE PRACTITIONER

## 2020-06-23 ENCOUNTER — OFFICE VISIT (OUTPATIENT)
Dept: OBGYN CLINIC | Facility: CLINIC | Age: 60
End: 2020-06-23
Payer: COMMERCIAL

## 2020-06-23 VITALS
BODY MASS INDEX: 25.51 KG/M2 | DIASTOLIC BLOOD PRESSURE: 80 MMHG | WEIGHT: 144 LBS | SYSTOLIC BLOOD PRESSURE: 108 MMHG | TEMPERATURE: 98.6 F

## 2020-06-23 DIAGNOSIS — M75.82 ROTATOR CUFF TENDINITIS, LEFT: Primary | ICD-10-CM

## 2020-06-23 PROCEDURE — 1036F TOBACCO NON-USER: CPT | Performed by: ORTHOPAEDIC SURGERY

## 2020-06-23 PROCEDURE — 20610 DRAIN/INJ JOINT/BURSA W/O US: CPT | Performed by: ORTHOPAEDIC SURGERY

## 2020-06-23 PROCEDURE — 99203 OFFICE O/P NEW LOW 30 MIN: CPT | Performed by: ORTHOPAEDIC SURGERY

## 2020-06-23 PROCEDURE — 3074F SYST BP LT 130 MM HG: CPT | Performed by: ORTHOPAEDIC SURGERY

## 2020-06-23 PROCEDURE — 3079F DIAST BP 80-89 MM HG: CPT | Performed by: ORTHOPAEDIC SURGERY

## 2020-06-23 RX ORDER — BETAMETHASONE SODIUM PHOSPHATE AND BETAMETHASONE ACETATE 3; 3 MG/ML; MG/ML
6 INJECTION, SUSPENSION INTRA-ARTICULAR; INTRALESIONAL; INTRAMUSCULAR; SOFT TISSUE
Status: COMPLETED | OUTPATIENT
Start: 2020-06-23 | End: 2020-06-23

## 2020-06-23 RX ORDER — LIDOCAINE HYDROCHLORIDE 10 MG/ML
4 INJECTION, SOLUTION INFILTRATION; PERINEURAL
Status: COMPLETED | OUTPATIENT
Start: 2020-06-23 | End: 2020-06-23

## 2020-06-23 RX ADMIN — BETAMETHASONE SODIUM PHOSPHATE AND BETAMETHASONE ACETATE 6 MG: 3; 3 INJECTION, SUSPENSION INTRA-ARTICULAR; INTRALESIONAL; INTRAMUSCULAR; SOFT TISSUE at 08:51

## 2020-06-23 RX ADMIN — LIDOCAINE HYDROCHLORIDE 4 ML: 10 INJECTION, SOLUTION INFILTRATION; PERINEURAL at 08:51

## 2020-06-30 NOTE — PROGRESS NOTES
PT Evaluation     Today's date: 2020  Patient name: Francisco Huff  : 1960  MRN: [id number]  Referring provider: Kenya Barnett MD  Dx:   Encounter Diagnosis     ICD-10-CM    1  Rotator cuff tendonitis, left M75 82                   Assessment  Assessment details: Francisco Huff is a 56 y o  female with a history of depression, hypothyroid, and a heart murmur that presents for a moderate complexity physical therapy initial evaluation  The patient demonstrates signs and symptoms consistent with left shoulder tendonitis/ capsular tightness  During the examination the patient demonstrated decreased L shoulder strength, decreased left shoulder ROM in a capsular pattern, activity intolerance, and L shoulder pain  The patient's impairments are causing the following functional limitations: Difficulty lifting/carrying objects heavier than 10 pounds, difficulty reaching behind back, difficulty reaching across her body, difficulty reaching above head, and difficulty donning/doffing a shirt  The patient's clinical presentation is evolving due to a number of participation restrictions, significant medial history, and functional limitation (FOTO 45% function)  The patient will benefit from skilled PT services to address impairments, work towards goals, and restore PLOF  Impairments: abnormal or restricted ROM, activity intolerance, impaired physical strength, lacks appropriate home exercise program, pain with function, poor posture  and poor body mechanics  Functional limitations: Difficulty lifting/carrying objects heavier than 10 pounds, difficulty reaching behind back, reaching across her body, difficulty reaching above head, and difficulty donning/doffing a shirt  Symptom irritability: moderateBarriers to therapy: Hx depression  Understanding of Dx/Px/POC: good   Prognosis: good  Prognosis details: Medical Hx    Goals  STG: ACHIEVE in 4 -6 weeks  1    Improve left shoulder pain at worst by 50% to improve ADL's   2   Improve left shoulder strength by 1/2-1 muscle grade to improve lifting/carrying tasks  3   Improve left shoulder flexion, abduction, IR/ER ROM by 35 degrees to improve the ability to reach over head  LTG:  Achieve in 8-12 weeks  1  Patient return to reaching out with left arm without pain greater than 3/10 in left shoulder to achieve their personal goal   2   Patient's left shoulder strength return to equal for both sides to lift/carry items without pain/difficulty  3   Patient's left shoulder ROM improve to WNL to perform all ADL's and overhead tasks without difficulty  4   Patient to be independent with home exercise plan at time of discharge  Plan  Plan details: REASSESS 1X/MONTH - PATIENT REQUESTED 1X/WEEK DUE TO FINANCIAL BURDEN  Patient would benefit from: skilled physical therapy  Planned modality interventions: unattended electrical stimulation, ultrasound, thermotherapy: hydrocollator packs, TENS and cryotherapy  Other planned modality interventions: IAS  Planned therapy interventions: joint mobilization, manual therapy, massage, neuromuscular re-education, patient education, postural training, strengthening, stretching, self care, therapeutic activities, therapeutic exercise, home exercise program, body mechanics training and abdominal trunk stabilization  Frequency: 1-2x/wk  Duration in weeks: 12  Plan of Care beginning date: 7/1/2020  Plan of Care expiration date: 9/30/2020  Treatment plan discussed with: PTA and patient        Subjective Evaluation    History of Present Illness  Date of onset: 3/17/2020  Mechanism of injury: Ricardo Morales is a 56 y o  female that presents to outpatient physical therapy with complaints of left shoulder pain starting in mid March with no SAVAGE  The patient reports difficulty moving her left arm at times with attempting to fold a blanket and put arm behind her back    The patient describes her symptoms as being intermittent and not predictable  The patient saw orthopedics who gave the patient a L shoulder steroid injection ( ) and referred the patient to PT for strengthening  The patient's main goal for physical therapy is to get rid of her left arm pain and move without difficulty for ADL's  L shoulder Xray: (-) abnormality            Not a recurrent problem   Pain  Current pain ratin (with movement)  At best pain ratin  At worst pain ratin  Location: upper lateral L arm  Quality: sharp and dull ache  Relieving factors: medications  Aggravating factors: lifting and overhead activity  Progression: worsening    Social Support  Lives with: parents    Employment status: not working ( - )  Hand dominance: right    Treatments  Previous treatment: injection treatment and medication  Current treatment: physical therapy  Patient Goals  Patient goals for therapy: decreased pain, increased motion, increased strength, independence with ADLs/IADLs, return to sport/leisure activities and return to work  Patient goal: decrease left arm pain and move left shoulder without difficulty        Objective     Static Posture     Head  Forward  Shoulders  Rounded  Scapulae  Left elevated  Thoracic Spine  Hyperkyphosis  Lumbar Spine   Decreased lordosis       Postural Observations  Seated posture: poor  Standing posture: poor  Correction of posture: has no consistent effect        Palpation     Additional Palpation Details  No tenderness    Tenderness     Left Shoulder   No tenderness     Right Shoulder  No tenderness     Cervical/Thoracic Screen   Cervical range of motion within normal limits  Thoracic range of motion within normal limits    Neurological Testing     Sensation     Shoulder   Left Shoulder   Intact: light touch    Right Shoulder   Intact: light touch    Reflexes   Left   Sloan's reflex: negative    Right   Sloan's reflex: negative    Additional Neurological Details  Patient complains of of intermittent tingling at her B/L finger tips    Active Range of Motion   Left Shoulder   Flexion: 110 degrees with pain  Extension: 45 degrees   Abduction: 72 degrees with pain  Adduction: 21 degrees   External rotation 0°: 34 degrees with pain  Internal rotation 0°: 80 degrees   Internal rotation BTB: sacrum with pain    Right Shoulder   Flexion: 160 degrees   Extension: 60 degrees   Abduction: 170 degrees   Adduction: 55 degrees   External rotation 0°: 86 degrees   Internal rotation 0°: 80 degrees   Internal rotation BTB: T7     Passive Range of Motion   Left Shoulder   Flexion: 125 degrees with pain  Abduction: 95 degrees with pain  External rotation 45°: 15 degrees with pain  Internal rotation 45°: 50 degrees     Right Shoulder   Normal passive range of motion  External rotation 45°: 88 degrees     Additional Passive Range of Motion Details  Patient had pain increase with PROM similar to AROM assessment    Scapular Mobility   Left Shoulder   Scapular Mobility with Shoulder to 90° FF   Scapular winging: minimal    Strength/Myotome Testing     Left Shoulder     Planes of Motion   Flexion: 3+   Extension: 3+   Abduction: 3+   Adduction: 3+   Internal rotation at 0°: 3+     Right Shoulder     Planes of Motion   Flexion: 5   Extension: 5   Abduction: 5   External rotation at 0°: 5   Internal rotation at 0°: 5     Tests     Left Shoulder   Positive passive horizontal adduction  Negative active compression ([place]), full can and Hawkin's  Right Shoulder   Negative passive horizontal adduction       Additional Tests Details  Shoulder FOTO: 45% ( predicted 66%)             Precautions: none      Re-evaluation: 7/30    Shoulder Specialty Daily Treatment Diary     Manual  7/1       PROM  shoulder                GH jt mobs        Pec Minor stretch                    Therapeutic Exercise 7/1       UBE        Pendulums 1min       Shoulder isometrics (flex, ext, abd, IR, ER)        TB sh  IR, ER  * Self shoulder flexion stretch 10x:10       Scap retraction 10x:05       Shoulder rolls        MTP/LTP        YTI's        Prone rows, EXT, Horiz abd  *      T-spine EXT        IR stretch arm behing back with towel roll  *      Pec minor stretch supine  *      Elbow flares / elbows together 10x:10       PNF (D1&D2)        Posterior capsule stretch 10x:10       Upper Trap Stretch  *      Levator Scap Stretch        SCM stretch        Neuro Re-ed                Therapeutic Activity                    Modalities 7/1       CP shoulder declined                                 The patient was given a new home exercise plan with written handout, pictures, and verbal instruction  The patient accepts and understands the new home activities

## 2020-07-01 ENCOUNTER — EVALUATION (OUTPATIENT)
Dept: PHYSICAL THERAPY | Facility: CLINIC | Age: 60
End: 2020-07-01
Payer: COMMERCIAL

## 2020-07-01 DIAGNOSIS — M75.82 ROTATOR CUFF TENDONITIS, LEFT: Primary | ICD-10-CM

## 2020-07-01 PROCEDURE — 97110 THERAPEUTIC EXERCISES: CPT | Performed by: PHYSICAL THERAPIST

## 2020-07-01 PROCEDURE — 97535 SELF CARE MNGMENT TRAINING: CPT | Performed by: PHYSICAL THERAPIST

## 2020-07-01 PROCEDURE — 97162 PT EVAL MOD COMPLEX 30 MIN: CPT | Performed by: PHYSICAL THERAPIST

## 2020-07-08 ENCOUNTER — OFFICE VISIT (OUTPATIENT)
Dept: PHYSICAL THERAPY | Facility: CLINIC | Age: 60
End: 2020-07-08
Payer: COMMERCIAL

## 2020-07-08 DIAGNOSIS — M75.82 ROTATOR CUFF TENDONITIS, LEFT: Primary | ICD-10-CM

## 2020-07-08 PROCEDURE — 97110 THERAPEUTIC EXERCISES: CPT | Performed by: PHYSICAL THERAPIST

## 2020-07-08 PROCEDURE — 97535 SELF CARE MNGMENT TRAINING: CPT | Performed by: PHYSICAL THERAPIST

## 2020-07-08 NOTE — PROGRESS NOTES
Daily Note     Today's date: 2020  Patient name: Marshall Garcia  : 1960  MRN: [id number]  Referring provider: Brina Monroy MD  Dx:   Encounter Diagnosis     ICD-10-CM    1  Rotator cuff tendonitis, left M75 82                   Subjective: The patient reports her left shoulder pain is 4-5/10 pain currently      Objective: See treatment diary below      Assessment: The patient tolerated all activities well today  The patient needed verbal and manual cues for proper posture and technique to perform the exercises properly  There were no complaints of increased pain or problems after the session today  The patient will benefit from continued skilled physical therapy to progress towards achieving patient centered goals  Plan: Continue per plan of care  Progress treatment as tolerated    Will perform [facility] mobs prior to stretching next visit     Precautions: none      Re-evaluation:     Shoulder Specialty Daily Treatment Diary     Manual        PROM  shoulder                GH jt mobs  Post, Inf, distract 5x15 grade 3 * DO before stretches     Pec Minor stretch                    Therapeutic Exercise       UBE        Pendulums 1min 1 min      Shoulder isometrics (flex, ext, abd, IR, ER)        TB sh  IR, ER  *NV              Self shoulder flexion stretch 10x:10       Scap retraction 10x:05 15x:05      Shoulder rolls        MTP/LTP        YTI's        Prone rows, EXT, Horiz abd  *NV      T-spine EXT        IR stretch arm behing back with towel roll  *4x:30      Pec minor stretch supine  *4x:30      Elbow flares / elbows together 10x:10 5x:15      PNF (D1&D2)        Posterior capsule stretch 10x:10 5x:15      Upper Trap Stretch  *3x:30      Levator Scap Stretch        SCM stretch        Neuro Re-ed                Therapeutic Activity                    Modalities       CP shoulder declined declined                                  The patient was given a new home exercise plan with written handout, pictures, and verbal instruction  The patient accepts and understands the new home activities

## 2020-07-14 ENCOUNTER — CLINICAL SUPPORT (OUTPATIENT)
Dept: FAMILY MEDICINE CLINIC | Facility: CLINIC | Age: 60
End: 2020-07-14
Payer: COMMERCIAL

## 2020-07-14 DIAGNOSIS — Z23 NEED FOR VACCINATION: Primary | ICD-10-CM

## 2020-07-14 PROCEDURE — 90471 IMMUNIZATION ADMIN: CPT

## 2020-07-14 PROCEDURE — 90750 HZV VACC RECOMBINANT IM: CPT

## 2020-07-15 ENCOUNTER — OFFICE VISIT (OUTPATIENT)
Dept: PHYSICAL THERAPY | Facility: CLINIC | Age: 60
End: 2020-07-15
Payer: COMMERCIAL

## 2020-07-15 DIAGNOSIS — M75.82 ROTATOR CUFF TENDONITIS, LEFT: Primary | ICD-10-CM

## 2020-07-15 PROCEDURE — 97140 MANUAL THERAPY 1/> REGIONS: CPT | Performed by: PHYSICAL THERAPIST

## 2020-07-15 PROCEDURE — 97110 THERAPEUTIC EXERCISES: CPT | Performed by: PHYSICAL THERAPIST

## 2020-07-15 PROCEDURE — 97535 SELF CARE MNGMENT TRAINING: CPT | Performed by: PHYSICAL THERAPIST

## 2020-07-15 NOTE — PROGRESS NOTES
Daily Note     Today's date: 7/15/2020  Patient name: Frandy Wakefield  : 1960  MRN: [id number]  Referring provider: Janett Miller MD  Dx:   Encounter Diagnosis     ICD-10-CM    1  Rotator cuff tendonitis, left M75 82                   Subjective: The patient notes feeling no pain to start the session today  The patient has been compliant with performing the exercises  The patient notes her pain goes to a 2/10 in intensity with performing the exercises  Objective: See treatment diary below      Assessment: The patient tolerated all activities well today  The patient needed verbal and manual cues for proper posture and technique to perform the exercises properly  There were mild complaints of increased pain at her left shoulder after the session today  The patient declined needing a cold pack for pain relief  The patient will benefit from continued skilled physical therapy to progress towards achieving patient centered goals  Plan: Continue per plan of care  Progress treatment as tolerated         Precautions: none      Re-evaluation:     Shoulder Specialty Daily Treatment Diary     Manual  7/1 7/8 7/15     PROM  shoulder   All 4x:20             GH jt mobs  Post, Inf, distract 5x15 grade 3 * DO before stretches  Done grade 2-3 inf, A/P, distract 5x:15 ea     Pec Minor stretch   Self                 Therapeutic Exercise 7/1 7/8 7/15     UBE        Pendulums 1min 1 min      Shoulder isometrics (flex, ext, abd, IR, ER)        TB sh  IR, ER  *NV Red x15             Self shoulder flexion stretch 10x:10  5x:20     Scap retraction 10x:05 15x:05 review     Shoulder rolls        MTP/LTP        YTI's        Prone rows, EXT, Horiz abd  *NV x15 ea     T-spine EXT        IR stretch arm behing back with towel roll  *4x:30 review     Pec minor stretch supine  *4x:30 4x:30     Elbow flares / elbows together 10x:10 5x:15 5x:20     PNF (D1&D2)        Posterior capsule stretch 10x:10 5x:15 3x:15     Upper Trap Stretch  *3x:30 reviewed     Levator Scap Stretch        SCM stretch        Neuro Re-ed                Therapeutic Activity                    Modalities 7/1 7/8 7/15     CP shoulder declined declined declined                             The patient was given a new home exercise plan with written handout, pictures, and verbal instruction  The patient accepts and understands the new home activities

## 2020-07-21 NOTE — PROGRESS NOTES
PT Re-Evaluation     Today's date: 2020  Patient name: Bob Rajan  : 1960  MRN: [id number]  Referring provider: Indu Rock MD  Dx:   Encounter Diagnosis     ICD-10-CM    1  Rotator cuff tendonitis, left M75 82                   Assessment  Assessment details: Bob Rajan is a 56 y o  female that has attended 4 sessions of physical therapy presents for a re-evaluation today for left shoulder tendonitis/ capsular tightness  During the examination the patient demonstrates: decreased pain levels, improved shoulder ROM, improved shoulder strength, and improved activity tolerance  The patient has made functional gains since starting therapy  The patient is now able to reach across her body, lift/carry objects with less difficulty, and reach above her head with less pain  The patient continues to have difficulty with end range external rotation  The patient is independent with her home exercises and is very compliant  The patient will be discharged from formal PT to a home program       Symptom irritability: lowBarriers to therapy: Hx depression  Understanding of Dx/Px/POC: good   Prognosis: good  Prognosis details: Medical Hx    Goals  STG: ACHIEVE in 4 -6 weeks  1  Improve left shoulder pain at worst by 50% to improve ADL's  MET  2  Improve left shoulder strength by 1/2-1 muscle grade to improve lifting/carrying tasks  MET  3  Improve left shoulder flexion, abduction, IR/ER ROM by 35 degrees to improve the ability to reach over head  MET    LTG:  Achieve in 8-12 weeks  1  Patient return to reaching out with left arm without pain greater than 3/10 in left shoulder to achieve their personal goal   MET  2  Patient's left shoulder strength return to equal for both sides to lift/carry items without pain/difficulty  MET  3  Patient's left shoulder ROM improve to WNL to perform all ADL's and overhead tasks without difficulty  MET  4    Patient to be independent with home exercise plan at time of discharge  MET           Plan  Plan details: D/C PT TO AN INDEPENDENT HEP  Treatment plan discussed with: PTA and patient        Subjective Evaluation    History of Present Illness  Date of onset: 3/17/2020  Mechanism of injury: SUBJECTIVE 20: The patient is doing well with therapy - now is able to elevate her left shoulder above head height without pain  The patient is pleased with her progress and feels she will be successful doing her exercises at home from this point onward  The patient is using her left arm for ADL's without difficulty  INJURY HISTORY: Bob Rajan is a 56 y o  female that presents to outpatient physical therapy with complaints of left shoulder pain starting in mid March with no SAVAGE  The patient reports difficulty moving her left arm at times with attempting to fold a blanket and put arm behind her back  The patient describes her symptoms as being intermittent and not predictable  The patient saw orthopedics who gave the patient a L shoulder steroid injection ( ) and referred the patient to PT for strengthening  The patient's main goal for physical therapy is to get rid of her left arm pain and move without difficulty for ADL's       L shoulder Xray: (-) abnormality            Not a recurrent problem   Pain  Current pain ratin  At best pain ratin  At worst pain ratin  Location: upper lateral L arm  Quality: sharp and dull ache  Relieving factors: medications  Aggravating factors: lifting and overhead activity  Progression: improved    Social Support  Lives with: parents    Employment status: not working ( - )  Hand dominance: right    Treatments  Previous treatment: injection treatment and medication  Current treatment: physical therapy  Patient Goals  Patient goal: decrease left arm pain and move left shoulder without difficulty ( met)        Objective     Static Posture Head  Forward  Shoulders  Rounded  Scapulae  Left elevated  Thoracic Spine  Hyperkyphosis  Lumbar Spine   Decreased lordosis       Postural Observations  Seated posture: fair  Standing posture: fair  Correction of posture: makes symptoms better        Palpation     Additional Palpation Details  No tenderness    Tenderness     Left Shoulder   No tenderness     Right Shoulder  No tenderness     Cervical/Thoracic Screen   Cervical range of motion within normal limits  Thoracic range of motion within normal limits    Neurological Testing     Sensation     Shoulder   Left Shoulder   Intact: light touch    Right Shoulder   Intact: light touch    Reflexes   Left   Sloan's reflex: negative    Right   Sloan's reflex: negative    Additional Neurological Details  Patient complains of of intermittent tingling at her B/L finger tips    Active Range of Motion   Left Shoulder   Flexion: 160 degrees   Extension: 45 degrees   Abduction: 170 degrees   Adduction: 62 degrees   External rotation 0°: 55 degrees with pain  Internal rotation 0°: 80 degrees   Internal rotation BTB: T8     Right Shoulder   Flexion: 160 degrees   Extension: 60 degrees   Abduction: 170 degrees   Adduction: 55 degrees   External rotation 0°: 86 degrees   Internal rotation 0°: 80 degrees   Internal rotation BTB: T7     Passive Range of Motion   Left Shoulder   Flexion: 165 degrees   Abduction: 180 degrees   External rotation 90°: 50 degrees with pain  Internal rotation 90°: 60 degrees     Right Shoulder   Normal passive range of motion  External rotation 45°: 88 degrees     Additional Passive Range of Motion Details  Significant improvement since last assessment    Scapular Mobility   Left Shoulder   Scapular Mobility with Shoulder to 90° FF   Scapular winging: minimal    Strength/Myotome Testing     Left Shoulder     Planes of Motion   Flexion: 4   Extension: 4   Abduction: 4   Adduction: 4   External rotation at 0°: 4-   Internal rotation at 0°: 4     Right Shoulder     Planes of Motion   Flexion: 5   Extension: 5   Abduction: 5   External rotation at 0°: 5   Internal rotation at 0°: 5     Additional Strength Details  Improved    Tests     Left Shoulder   Negative active compression ([place]), full can, Hawkin's and passive horizontal adduction  Right Shoulder   Negative passive horizontal adduction  Additional Tests Details  Shoulder FOTO: 62% ( predicted 66%) ( improved 17%)             Precautions: none      Re-evaluation: 7/30  Shoulder Specialty Daily Treatment Diary     Manual  7/1 7/8 7/15 7/22    PROM  shoulder   All 4x:20 All 4x:20            GH jt mobs  Post, Inf, distract 5x15 grade 3 * DO before stretches  Done grade 2-3 inf, A/P, distract 5x:15 ea Done all grade 3 5x:15    Pec Minor stretch   Self self                Therapeutic Exercise 7/1 7/8 7/15 7/22    UBE        Pendulums 1min 1 min  reviewed    Shoulder isometrics (flex, ext, abd, IR, ER)        TB sh  IR, ER  *NV Red x15 Red x 15            Self shoulder flexion stretch 10x:10  5x:20 3x:20    Scap retraction 10x:05 15x:05 review     Shoulder rolls        MTP/LTP    Red x15 ea    YTI's        Prone rows, EXT, Horiz abd  *NV x15 ea review    T-spine EXT        IR stretch arm behing back with towel roll  *4x:30 review     Pec minor stretch supine  *4x:30 4x:30 4x:30    Elbow flares / elbows together 10x:10 5x:15 5x:20 2x:30 review    PNF (D1&D2)        Posterior capsule stretch 10x:10 5x:15 3x:15 reviewed    Upper Trap Stretch  *3x:30 reviewed     Levator Scap Stretch        SCM stretch        Neuro Re-ed                Therapeutic Activity                    Modalities 7/1 7/8 7/15 7/22    CP shoulder declined declined declined declined                          The patient was given a new home exercise plan with written handout, pictures, and verbal instruction  The patient accepts and understands the new home activities

## 2020-07-22 ENCOUNTER — EVALUATION (OUTPATIENT)
Dept: PHYSICAL THERAPY | Facility: CLINIC | Age: 60
End: 2020-07-22
Payer: COMMERCIAL

## 2020-07-22 DIAGNOSIS — M75.82 ROTATOR CUFF TENDONITIS, LEFT: Primary | ICD-10-CM

## 2020-07-22 PROCEDURE — 97110 THERAPEUTIC EXERCISES: CPT | Performed by: PHYSICAL THERAPIST

## 2020-07-22 PROCEDURE — 97535 SELF CARE MNGMENT TRAINING: CPT | Performed by: PHYSICAL THERAPIST

## 2020-07-22 PROCEDURE — 97140 MANUAL THERAPY 1/> REGIONS: CPT | Performed by: PHYSICAL THERAPIST

## 2020-09-04 ENCOUNTER — ANNUAL EXAM (OUTPATIENT)
Dept: OBGYN CLINIC | Facility: CLINIC | Age: 60
End: 2020-09-04
Payer: COMMERCIAL

## 2020-09-04 VITALS
DIASTOLIC BLOOD PRESSURE: 90 MMHG | WEIGHT: 140 LBS | HEIGHT: 62 IN | TEMPERATURE: 98.5 F | BODY MASS INDEX: 25.76 KG/M2 | SYSTOLIC BLOOD PRESSURE: 130 MMHG

## 2020-09-04 DIAGNOSIS — B00.9 HSV (HERPES SIMPLEX VIRUS) INFECTION: ICD-10-CM

## 2020-09-04 DIAGNOSIS — N95.2 VAGINAL ATROPHY: ICD-10-CM

## 2020-09-04 DIAGNOSIS — Z12.31 ENCOUNTER FOR SCREENING MAMMOGRAM FOR BREAST CANCER: ICD-10-CM

## 2020-09-04 DIAGNOSIS — Z12.4 CERVICAL CANCER SCREENING: ICD-10-CM

## 2020-09-04 DIAGNOSIS — Z11.51 SCREENING FOR HPV (HUMAN PAPILLOMAVIRUS): Primary | ICD-10-CM

## 2020-09-04 DIAGNOSIS — Z01.419 ENCOUNTER FOR ANNUAL ROUTINE GYNECOLOGICAL EXAMINATION: ICD-10-CM

## 2020-09-04 PROCEDURE — G0145 SCR C/V CYTO,THINLAYER,RESCR: HCPCS | Performed by: OBSTETRICS & GYNECOLOGY

## 2020-09-04 PROCEDURE — 87624 HPV HI-RISK TYP POOLED RSLT: CPT | Performed by: OBSTETRICS & GYNECOLOGY

## 2020-09-04 PROCEDURE — 99396 PREV VISIT EST AGE 40-64: CPT | Performed by: OBSTETRICS & GYNECOLOGY

## 2020-09-04 RX ORDER — VALACYCLOVIR HYDROCHLORIDE 500 MG/1
500 TABLET, FILM COATED ORAL 2 TIMES DAILY
Qty: 6 TABLET | Refills: 1 | Status: SHIPPED | OUTPATIENT
Start: 2020-09-04 | End: 2022-06-20

## 2020-09-04 RX ORDER — ESTRADIOL 0.1 MG/G
1 CREAM VAGINAL 2 TIMES WEEKLY
Qty: 42.5 G | Refills: 1 | Status: SHIPPED | OUTPATIENT
Start: 2020-09-07 | End: 2021-10-26 | Stop reason: SDUPTHER

## 2020-09-04 NOTE — PROGRESS NOTES
Assessment/Plan:    pap and HPV done today    mammogram reviewed with her including breast density  RX given so she can schedule this    Discussed self breast exams    colon cancer screening done in 2019, return in 5 years    History of HSV-prescription given for Valtrex and she is aware of had a use this  Vaginal atrophy-we discussed lubricants and vaginal moisturizer  I recommended a water-based lubricant because she is using condoms due to the history of herpes  We also discussed vaginal estrogen and she is interested in this  I reviewed side effects and risks  Prescription for estradiol cream sent to her pharmacy  discussed preventive care, regular exercise and a healthy diet  ? Bakers cyst on the back of her left knee, there is a small mass under the skin of her right foot  I recommended that she follow with her family doctor for these issues  No problem-specific Assessment & Plan notes found for this encounter  Diagnoses and all orders for this visit:    Encounter for annual routine gynecological examination    Encounter for screening mammogram for breast cancer  -     Mammo screening bilateral w 3d & cad; Future    Cervical cancer screening    Screening for HPV (human papillomavirus)          Subjective:      Patient ID: Jesus Hughes is a 56 y o  female  Patient here for yearly  She has recently become sexually active after 10 years and is having vaginal dryness and pain  She bought progest over-the-counter and has been using this  She has a history of HSV and has infrequent outbreaks  She uses Valtrex as needed  She would like a paper prescription for this  Lost 32 lbs since January with [employer]  Normal Pap and negative HPV in 2016  Normal 3D mammogram in August 2019 that showed dense tissue and average risk  She has some general questions about an area on the bottom of her foot and on the back of her left knee        The following portions of the patient's history were reviewed and updated as appropriate: allergies, current medications, past family history, past medical history, past social history, past surgical history and problem list     Review of Systems   Constitutional: Negative  Gastrointestinal: Negative  Genitourinary: Positive for vaginal pain  Objective:      /90 (BP Location: Left arm, Patient Position: Sitting, Cuff Size: Adult)   Temp 98 5 °F (36 9 °C)   Ht 5' 2" (1 575 m)   Wt 63 5 kg (140 lb)   BMI 25 61 kg/m²          Physical Exam  Vitals signs reviewed  Constitutional:       Appearance: She is well-developed  Neck:      Thyroid: No thyromegaly  Trachea: No tracheal deviation  Cardiovascular:      Rate and Rhythm: Normal rate and regular rhythm  Pulmonary:      Effort: Pulmonary effort is normal       Breath sounds: Normal breath sounds  Chest:      Breasts: Breasts are symmetrical          Right: No inverted nipple, mass, nipple discharge, skin change or tenderness  Left: No inverted nipple, mass, nipple discharge, skin change or tenderness  Abdominal:      General: There is no distension  Palpations: Abdomen is soft  There is no mass  Tenderness: There is no abdominal tenderness  Genitourinary:     Labia:         Right: No rash, tenderness, lesion or injury  Left: No rash, tenderness, lesion or injury  Vagina: Normal       Cervix: No cervical motion tenderness, discharge or friability  Adnexa:         Right: No mass, tenderness or fullness  Left: No mass, tenderness or fullness  Rectum: Normal    Musculoskeletal:      Comments: The back of her left knee is more prominent than the right knee  Nontender    There is a small 1-2 centimeter mass under the skin of her right foot    Nontender

## 2020-09-08 LAB
HPV HR 12 DNA CVX QL NAA+PROBE: NEGATIVE
HPV16 DNA CVX QL NAA+PROBE: NEGATIVE
HPV18 DNA CVX QL NAA+PROBE: NEGATIVE

## 2020-09-11 LAB
LAB AP GYN PRIMARY INTERPRETATION: NORMAL
Lab: NORMAL

## 2020-09-21 ENCOUNTER — OFFICE VISIT (OUTPATIENT)
Dept: URGENT CARE | Facility: CLINIC | Age: 60
End: 2020-09-21
Payer: COMMERCIAL

## 2020-09-21 VITALS
DIASTOLIC BLOOD PRESSURE: 84 MMHG | OXYGEN SATURATION: 98 % | WEIGHT: 142 LBS | HEART RATE: 72 BPM | TEMPERATURE: 97.7 F | SYSTOLIC BLOOD PRESSURE: 137 MMHG | BODY MASS INDEX: 25.16 KG/M2 | RESPIRATION RATE: 18 BRPM | HEIGHT: 63 IN

## 2020-09-21 DIAGNOSIS — S61.213A LACERATION OF LEFT MIDDLE FINGER WITHOUT FOREIGN BODY, NAIL DAMAGE STATUS UNSPECIFIED, INITIAL ENCOUNTER: Primary | ICD-10-CM

## 2020-09-21 PROCEDURE — 99213 OFFICE O/P EST LOW 20 MIN: CPT | Performed by: NURSE PRACTITIONER

## 2020-09-21 PROCEDURE — 12001 RPR S/N/AX/GEN/TRNK 2.5CM/<: CPT | Performed by: NURSE PRACTITIONER

## 2020-09-21 PROCEDURE — S9088 SERVICES PROVIDED IN URGENT: HCPCS | Performed by: NURSE PRACTITIONER

## 2020-09-21 NOTE — PROGRESS NOTES
[facility]        NAME: Riana Novoa is a 56 y o  female  : 1960    MRN: [id number]  DATE: 2020  TIME: 09:15 AM    Assessment and Plan   Laceration of left middle finger without foreign body, nail damage status unspecified, initial encounter [S61 213A]  1  Laceration of left middle finger without foreign body, nail damage status unspecified, initial encounter           Patient Instructions     Patient Instructions   Five sutures inserted into left middle finger  Keep site clean dry and covered  Apply triple antibiotic ointment  Monitor for signs of infection including redness, swelling, drainage, fevers, streaking up finger, fevers  These symptoms occur seek care  Suture removal and 7-10 days  Go to the ER with worsening symptoms  Chief Complaint     Chief Complaint   Patient presents with    Finger Laceration     pt pinched her left middle finger between 2 rolling beds         History of Present Illness   Riana Novoa presents to the clinic c/o    This is a 47-year-old female here today with complaints finger last   She states her fingers pinched between 2 beds  She noticed bleeding  Tetanus is up-to-date from 2017  She has some numbness on the tip of her finger  She ran the wound under cold water prior to coming  No decreased range of motion of the finger  Review of Systems   Review of Systems   Constitutional: Negative  Respiratory: Negative  Cardiovascular: Negative  Skin: Positive for wound  Neurological: Negative            Current Medications     Long-Term Medications   Medication Sig Dispense Refill    estradiol (ESTRACE) 0 1 mg/g vaginal cream Insert 1 g into the vagina 2 (two) times a week At bedtime 42 5 g 1    levothyroxine 50 mcg tablet Take 1 tablet (50 mcg total) by mouth daily 90 tablet 3    lisinopril (ZESTRIL) 10 mg tablet Take 1 tablet (10 mg total) by mouth daily 90 tablet 3    valACYclovir (VALTREX) 500 mg tablet Take 1 tablet (500 mg total) by mouth 2 (two) times a day for 3 days Prn outbreaks 6 tablet 1       Current Allergies     Allergies as of 09/21/2020    (No Known Allergies)            The following portions of the patient's history were reviewed and updated as appropriate: allergies, current medications, past family history, past medical history, past social history, past surgical history and problem list     Objective   /84   Pulse 72   Temp 97 7 °F (36 5 °C) (Temporal)   Resp 18   Ht 5' 3" (1 6 m)   Wt 64 4 kg (142 lb)   SpO2 98%   BMI 25 15 kg/m²          Physical Exam     Physical Exam  Vitals signs and nursing note reviewed  Constitutional:       Appearance: Normal appearance  Cardiovascular:      Rate and Rhythm: Normal rate and regular rhythm  Pulses: Normal pulses  Heart sounds: Normal heart sounds  Skin:     Comments: Laceration:  1 in you shaped laceration over the distal aspect of the palmar surface of the left middle finger  Neurological:      Mental Status: She is alert and oriented to person, place, and time  Psychiatric:         Mood and Affect: Mood normal          Behavior: Behavior normal          Thought Content: Thought content normal          Judgment: Judgment normal          Laceration repair    Date/Time: 9/21/2020 9:30 AM  Performed by: Jonathan Richey  Authorized by: KEM Strickland   Consent: Verbal consent not obtained    Body area: upper extremity  Location details: left long finger  Laceration length: 2 5 cm  Foreign bodies: no foreign bodies  Anesthesia: local infiltration and digital block    Anesthesia:  Local Anesthetic: lidocaine 1% without epinephrine  Anesthetic total: 4 mL      Procedure Details:  Irrigation solution: saline  Amount of cleaning: standard  Debridement: none  Degree of undermining: none  Skin closure: 5-0 nylon  Number of sutures: 5  Technique: simple  Approximation: close  Approximation difficulty: simple  Dressing: 4x4 sterile gauze and antibiotic ointment  Patient tolerance: Patient tolerated the procedure well with no immediate complications

## 2020-09-21 NOTE — PATIENT INSTRUCTIONS
Five sutures inserted into left middle finger  Keep site clean dry and covered  Apply triple antibiotic ointment  Monitor for signs of infection including redness, swelling, drainage, fevers, streaking up finger, fevers  These symptoms occur seek care  Suture removal and 7-10 days  Go to the ER with worsening symptoms

## 2020-09-28 ENCOUNTER — OFFICE VISIT (OUTPATIENT)
Dept: URGENT CARE | Facility: CLINIC | Age: 60
End: 2020-09-28
Payer: COMMERCIAL

## 2020-09-28 ENCOUNTER — LAB (OUTPATIENT)
Dept: LAB | Facility: CLINIC | Age: 60
End: 2020-09-28
Payer: COMMERCIAL

## 2020-09-28 VITALS
DIASTOLIC BLOOD PRESSURE: 69 MMHG | RESPIRATION RATE: 18 BRPM | HEART RATE: 78 BPM | TEMPERATURE: 97.9 F | WEIGHT: 147.2 LBS | OXYGEN SATURATION: 98 % | HEIGHT: 63 IN | SYSTOLIC BLOOD PRESSURE: 114 MMHG | BODY MASS INDEX: 26.08 KG/M2

## 2020-09-28 DIAGNOSIS — E78.5 HYPERLIPIDEMIA, UNSPECIFIED HYPERLIPIDEMIA TYPE: ICD-10-CM

## 2020-09-28 DIAGNOSIS — L08.9 INFECTED WOUND: Primary | ICD-10-CM

## 2020-09-28 DIAGNOSIS — I10 HYPERTENSION, UNSPECIFIED TYPE: ICD-10-CM

## 2020-09-28 DIAGNOSIS — E03.9 HYPOTHYROIDISM, UNSPECIFIED TYPE: ICD-10-CM

## 2020-09-28 DIAGNOSIS — T14.8XXA INFECTED WOUND: Primary | ICD-10-CM

## 2020-09-28 DIAGNOSIS — Z20.822 EXPOSURE TO COVID-19 VIRUS: ICD-10-CM

## 2020-09-28 LAB
ALBUMIN SERPL BCP-MCNC: 3.5 G/DL (ref 3.5–5)
ALP SERPL-CCNC: 69 U/L (ref 46–116)
ALT SERPL W P-5'-P-CCNC: 18 U/L (ref 12–78)
ANION GAP SERPL CALCULATED.3IONS-SCNC: 4 MMOL/L (ref 4–13)
AST SERPL W P-5'-P-CCNC: 11 U/L (ref 5–45)
BILIRUB SERPL-MCNC: 0.33 MG/DL (ref 0.2–1)
BUN SERPL-MCNC: 13 MG/DL (ref 5–25)
CALCIUM SERPL-MCNC: 8.8 MG/DL (ref 8.3–10.1)
CHLORIDE SERPL-SCNC: 112 MMOL/L (ref 100–108)
CHOLEST SERPL-MCNC: 223 MG/DL (ref 50–200)
CO2 SERPL-SCNC: 28 MMOL/L (ref 21–32)
CREAT SERPL-MCNC: 0.7 MG/DL (ref 0.6–1.3)
GFR SERPL CREATININE-BSD FRML MDRD: 93 ML/MIN/1.73SQ M
GLUCOSE P FAST SERPL-MCNC: 84 MG/DL (ref 65–99)
HDLC SERPL-MCNC: 74 MG/DL
LDLC SERPL CALC-MCNC: 136 MG/DL (ref 0–100)
NONHDLC SERPL-MCNC: 149 MG/DL
POTASSIUM SERPL-SCNC: 3.9 MMOL/L (ref 3.5–5.3)
PROT SERPL-MCNC: 6.6 G/DL (ref 6.4–8.2)
SARS-COV-2 IGG+IGM SERPL QL IA: NORMAL
SODIUM SERPL-SCNC: 144 MMOL/L (ref 136–145)
TRIGL SERPL-MCNC: 66 MG/DL
TSH SERPL DL<=0.05 MIU/L-ACNC: 2.93 UIU/ML (ref 0.36–3.74)

## 2020-09-28 PROCEDURE — 80053 COMPREHEN METABOLIC PANEL: CPT

## 2020-09-28 PROCEDURE — 86769 SARS-COV-2 COVID-19 ANTIBODY: CPT

## 2020-09-28 PROCEDURE — 36415 COLL VENOUS BLD VENIPUNCTURE: CPT

## 2020-09-28 PROCEDURE — 84443 ASSAY THYROID STIM HORMONE: CPT

## 2020-09-28 PROCEDURE — S9088 SERVICES PROVIDED IN URGENT: HCPCS | Performed by: NURSE PRACTITIONER

## 2020-09-28 PROCEDURE — 99213 OFFICE O/P EST LOW 20 MIN: CPT | Performed by: NURSE PRACTITIONER

## 2020-09-28 PROCEDURE — 80061 LIPID PANEL: CPT

## 2020-09-28 RX ORDER — CEPHALEXIN 500 MG/1
500 CAPSULE ORAL EVERY 8 HOURS SCHEDULED
Qty: 21 CAPSULE | Refills: 0 | Status: SHIPPED | OUTPATIENT
Start: 2020-09-28 | End: 2020-10-05

## 2020-09-28 NOTE — PATIENT INSTRUCTIONS
Start antibiotic  Take probiotic  I recommend leaving open to air while at home  Keep dressing while not at home  Follow-up PCP for wound recheck in 2-3 days  Go to the ER with any worsening symptoms

## 2020-09-28 NOTE — PROGRESS NOTES
[facility]        NAME: Ceasar Bhatia is a 56 y o  female  : 1960    MRN: [id number]  DATE: 2020  TIME: 5:36 PM    Assessment and Plan   Infected wound [T14  8XXA, L08 9]  1  Infected wound  cephalexin (KEFLEX) 500 mg capsule         Patient Instructions     Patient Instructions   Start antibiotic  Take probiotic  I recommend leaving open to air while at home  Keep dressing while not at home  Follow-up PCP for wound recheck in 2-3 days  Go to the ER with any worsening symptoms  Chief Complaint     Chief Complaint   Patient presents with    Wound Infection     had stitches in last monday and now red/puffy          History of Present Illness   Ceasar Bhatia presents to the clinic c/o    This is a 76-year-old female here today with complaints possible wound infection  She states over last several days she has noticed the incision line has become red and puffy  She states she did have a dressing on and left a dressing on for 2 days and when she took the dressing off the area looks moist   She then noticed redness and swelling  She denies any fevers bodies or chills  Full range of motion of finger  Injury occurred 1 week ago      Review of Systems   Review of Systems   Constitutional: Negative for activity change, chills, fatigue and fever  Respiratory: Negative  Cardiovascular: Negative  Skin: Positive for wound  Neurological: Negative  Psychiatric/Behavioral: Negative            Current Medications     Long-Term Medications   Medication Sig Dispense Refill    estradiol (ESTRACE) 0 1 mg/g vaginal cream Insert 1 g into the vagina 2 (two) times a week At bedtime 42 5 g 1    levothyroxine 50 mcg tablet Take 1 tablet (50 mcg total) by mouth daily 90 tablet 3    lisinopril (ZESTRIL) 10 mg tablet Take 1 tablet (10 mg total) by mouth daily 90 tablet 3    valACYclovir (VALTREX) 500 mg tablet Take 1 tablet (500 mg total) by mouth 2 (two) times a day for 3 days Prn outbreaks 6 tablet 1       Current Allergies     Allergies as of 09/28/2020    (No Known Allergies)            The following portions of the patient's history were reviewed and updated as appropriate: allergies, current medications, past family history, past medical history, past social history, past surgical history and problem list     Objective   /69   Pulse 78   Temp 97 9 °F (36 6 °C)   Resp 18   Ht 5' 3" (1 6 m)   Wt 66 8 kg (147 lb 3 2 oz)   SpO2 98%   BMI 26 08 kg/m²        Physical Exam     Physical Exam  Constitutional:       Appearance: Normal appearance  Neck:      Musculoskeletal: Normal range of motion and neck supple  Cardiovascular:      Rate and Rhythm: Normal rate and regular rhythm  Pulmonary:      Effort: Pulmonary effort is normal    Skin:     Comments: Left middle finger: There is a 1 in laceration over the palmar aspect the distal finger  There is mild redness and swelling around site  No drainage from site  Decreased range of motion due to pain  Neurological:      General: No focal deficit present  Mental Status: She is alert and oriented to person, place, and time  Psychiatric:         Mood and Affect: Mood normal          Behavior: Behavior normal          Thought Content:  Thought content normal          Judgment: Judgment normal

## 2020-10-01 ENCOUNTER — APPOINTMENT (OUTPATIENT)
Dept: RADIOLOGY | Facility: CLINIC | Age: 60
End: 2020-10-01
Payer: COMMERCIAL

## 2020-10-01 ENCOUNTER — OFFICE VISIT (OUTPATIENT)
Dept: FAMILY MEDICINE CLINIC | Facility: CLINIC | Age: 60
End: 2020-10-01
Payer: COMMERCIAL

## 2020-10-01 VITALS
OXYGEN SATURATION: 98 % | DIASTOLIC BLOOD PRESSURE: 78 MMHG | HEART RATE: 88 BPM | SYSTOLIC BLOOD PRESSURE: 118 MMHG | TEMPERATURE: 98.1 F | WEIGHT: 145 LBS | BODY MASS INDEX: 25.69 KG/M2 | HEIGHT: 63 IN | RESPIRATION RATE: 12 BRPM

## 2020-10-01 DIAGNOSIS — M25.562 CHRONIC PAIN OF LEFT KNEE: ICD-10-CM

## 2020-10-01 DIAGNOSIS — Z48.02 ENCOUNTER FOR REMOVAL OF SUTURES: ICD-10-CM

## 2020-10-01 DIAGNOSIS — E03.9 ACQUIRED HYPOTHYROIDISM: ICD-10-CM

## 2020-10-01 DIAGNOSIS — S61.213D LACERATION OF LEFT MIDDLE FINGER WITHOUT FOREIGN BODY WITHOUT DAMAGE TO NAIL, SUBSEQUENT ENCOUNTER: ICD-10-CM

## 2020-10-01 DIAGNOSIS — I10 ESSENTIAL HYPERTENSION: Primary | ICD-10-CM

## 2020-10-01 DIAGNOSIS — G89.29 CHRONIC PAIN OF LEFT KNEE: ICD-10-CM

## 2020-10-01 DIAGNOSIS — Z23 ENCOUNTER FOR IMMUNIZATION: ICD-10-CM

## 2020-10-01 PROCEDURE — 90471 IMMUNIZATION ADMIN: CPT

## 2020-10-01 PROCEDURE — 99214 OFFICE O/P EST MOD 30 MIN: CPT | Performed by: NURSE PRACTITIONER

## 2020-10-01 PROCEDURE — 90682 RIV4 VACC RECOMBINANT DNA IM: CPT

## 2020-10-01 PROCEDURE — 3725F SCREEN DEPRESSION PERFORMED: CPT | Performed by: NURSE PRACTITIONER

## 2020-10-01 PROCEDURE — 1036F TOBACCO NON-USER: CPT | Performed by: NURSE PRACTITIONER

## 2020-10-01 PROCEDURE — 73562 X-RAY EXAM OF KNEE 3: CPT

## 2020-10-09 ENCOUNTER — TELEPHONE (OUTPATIENT)
Dept: FAMILY MEDICINE CLINIC | Facility: CLINIC | Age: 60
End: 2020-10-09

## 2020-10-09 DIAGNOSIS — R22.40 MASS OF KNEE, UNSPECIFIED LATERALITY: Primary | ICD-10-CM

## 2020-11-04 ENCOUNTER — CONSULT (OUTPATIENT)
Dept: SURGERY | Facility: CLINIC | Age: 60
End: 2020-11-04
Payer: COMMERCIAL

## 2020-11-04 VITALS
TEMPERATURE: 97.8 F | HEART RATE: 110 BPM | BODY MASS INDEX: 25.87 KG/M2 | WEIGHT: 146 LBS | RESPIRATION RATE: 16 BRPM | HEIGHT: 63 IN | DIASTOLIC BLOOD PRESSURE: 76 MMHG | SYSTOLIC BLOOD PRESSURE: 120 MMHG

## 2020-11-04 DIAGNOSIS — R22.40 MASS OF KNEE, UNSPECIFIED LATERALITY: ICD-10-CM

## 2020-11-04 DIAGNOSIS — Z12.31 BREAST CANCER SCREENING BY MAMMOGRAM: ICD-10-CM

## 2020-11-04 DIAGNOSIS — M79.89 PALPABLE MASS OF SOFT TISSUE OF KNEE: Primary | ICD-10-CM

## 2020-11-04 PROCEDURE — 3008F BODY MASS INDEX DOCD: CPT | Performed by: SURGERY

## 2020-11-04 PROCEDURE — 99243 OFF/OP CNSLTJ NEW/EST LOW 30: CPT | Performed by: SURGERY

## 2020-11-04 PROCEDURE — 3078F DIAST BP <80 MM HG: CPT | Performed by: SURGERY

## 2020-11-04 PROCEDURE — 3074F SYST BP LT 130 MM HG: CPT | Performed by: SURGERY

## 2020-11-04 PROCEDURE — 1036F TOBACCO NON-USER: CPT | Performed by: SURGERY

## 2020-11-17 ENCOUNTER — TELEPHONE (OUTPATIENT)
Dept: SURGERY | Facility: CLINIC | Age: 60
End: 2020-11-17

## 2020-11-18 ENCOUNTER — TELEPHONE (OUTPATIENT)
Dept: SURGERY | Facility: CLINIC | Age: 60
End: 2020-11-18

## 2020-12-07 ENCOUNTER — TRANSCRIBE ORDERS (OUTPATIENT)
Dept: ADMINISTRATIVE | Facility: HOSPITAL | Age: 60
End: 2020-12-07

## 2020-12-07 DIAGNOSIS — M79.89 OTHER SPECIFIED SOFT TISSUE DISORDERS: Primary | ICD-10-CM

## 2020-12-17 ENCOUNTER — HOSPITAL ENCOUNTER (OUTPATIENT)
Dept: MAMMOGRAPHY | Facility: HOSPITAL | Age: 60
Discharge: HOME | End: 2020-12-17
Attending: SURGERY
Payer: COMMERCIAL

## 2020-12-17 ENCOUNTER — HOSPITAL ENCOUNTER (OUTPATIENT)
Dept: ULTRASOUND IMAGING | Facility: HOSPITAL | Age: 60
Discharge: HOME | End: 2020-12-17
Attending: SURGERY
Payer: COMMERCIAL

## 2020-12-17 VITALS — HEIGHT: 63 IN | WEIGHT: 145 LBS | BODY MASS INDEX: 25.69 KG/M2

## 2020-12-17 DIAGNOSIS — M79.89 OTHER SPECIFIED SOFT TISSUE DISORDERS: ICD-10-CM

## 2020-12-17 DIAGNOSIS — Z12.31 BREAST CANCER SCREENING BY MAMMOGRAM: ICD-10-CM

## 2020-12-17 PROCEDURE — 77067 SCR MAMMO BI INCL CAD: CPT

## 2020-12-17 PROCEDURE — 76882 US LMTD JT/FCL EVL NVASC XTR: CPT

## 2020-12-17 PROCEDURE — 77063 BREAST TOMOSYNTHESIS BI: CPT

## 2020-12-18 ENCOUNTER — TELEPHONE (OUTPATIENT)
Dept: SURGERY | Facility: CLINIC | Age: 60
End: 2020-12-18

## 2020-12-18 DIAGNOSIS — R92.8 ABNORMAL MAMMOGRAM: Primary | ICD-10-CM

## 2020-12-21 ENCOUNTER — TELEPHONE (OUTPATIENT)
Dept: SURGERY | Facility: CLINIC | Age: 60
End: 2020-12-21

## 2020-12-24 ENCOUNTER — HOSPITAL ENCOUNTER (OUTPATIENT)
Dept: ULTRASOUND IMAGING | Facility: CLINIC | Age: 60
Discharge: HOME | End: 2020-12-24
Payer: COMMERCIAL

## 2020-12-24 ENCOUNTER — HOSPITAL ENCOUNTER (OUTPATIENT)
Dept: MAMMOGRAPHY | Facility: CLINIC | Age: 60
Discharge: HOME | End: 2020-12-24
Payer: COMMERCIAL

## 2020-12-24 VITALS — WEIGHT: 145 LBS | BODY MASS INDEX: 25.69 KG/M2 | HEIGHT: 63 IN

## 2020-12-24 DIAGNOSIS — R92.8 ABNORMAL MAMMOGRAM: ICD-10-CM

## 2020-12-24 PROCEDURE — 76642 ULTRASOUND BREAST LIMITED: CPT

## 2020-12-24 PROCEDURE — 77065 DX MAMMO INCL CAD UNI: CPT

## 2020-12-24 PROCEDURE — G0279 TOMOSYNTHESIS, MAMMO: HCPCS

## 2021-02-11 DIAGNOSIS — E03.9 HYPOTHYROIDISM, UNSPECIFIED TYPE: ICD-10-CM

## 2021-02-11 RX ORDER — LEVOTHYROXINE SODIUM 0.05 MG/1
50 TABLET ORAL DAILY
Qty: 90 TABLET | Refills: 0 | Status: SHIPPED | OUTPATIENT
Start: 2021-02-11 | End: 2021-09-21 | Stop reason: SDUPTHER

## 2021-06-25 ENCOUNTER — OFFICE VISIT (OUTPATIENT)
Dept: FAMILY MEDICINE CLINIC | Facility: CLINIC | Age: 61
End: 2021-06-25
Payer: COMMERCIAL

## 2021-06-25 VITALS
DIASTOLIC BLOOD PRESSURE: 72 MMHG | WEIGHT: 148 LBS | BODY MASS INDEX: 26.22 KG/M2 | SYSTOLIC BLOOD PRESSURE: 118 MMHG | HEIGHT: 63 IN | HEART RATE: 78 BPM | OXYGEN SATURATION: 98 % | TEMPERATURE: 99.1 F | RESPIRATION RATE: 20 BRPM

## 2021-06-25 DIAGNOSIS — M54.50 CHRONIC BILATERAL LOW BACK PAIN WITHOUT SCIATICA: ICD-10-CM

## 2021-06-25 DIAGNOSIS — G89.29 CHRONIC BILATERAL LOW BACK PAIN WITHOUT SCIATICA: ICD-10-CM

## 2021-06-25 DIAGNOSIS — R10.11 RUQ ABDOMINAL PAIN: ICD-10-CM

## 2021-06-25 DIAGNOSIS — E66.3 OVERWEIGHT WITH BODY MASS INDEX (BMI) OF 26 TO 26.9 IN ADULT: ICD-10-CM

## 2021-06-25 DIAGNOSIS — R10.12 INTERMITTENT LEFT UPPER QUADRANT ABDOMINAL PAIN: ICD-10-CM

## 2021-06-25 DIAGNOSIS — R10.32 LEFT LOWER QUADRANT ABDOMINAL PAIN: Primary | ICD-10-CM

## 2021-06-25 PROCEDURE — 99214 OFFICE O/P EST MOD 30 MIN: CPT | Performed by: NURSE PRACTITIONER

## 2021-06-25 PROCEDURE — 3008F BODY MASS INDEX DOCD: CPT | Performed by: NURSE PRACTITIONER

## 2021-06-25 PROCEDURE — 1036F TOBACCO NON-USER: CPT | Performed by: NURSE PRACTITIONER

## 2021-06-25 PROCEDURE — 3725F SCREEN DEPRESSION PERFORMED: CPT | Performed by: NURSE PRACTITIONER

## 2021-06-25 NOTE — PROGRESS NOTES
[address] [facility]        NAME: Herlinda Olson is a 59 y o  female  : 1960    MRN: [id number]  DATE: 2021  TIME: 3:30 PM    Assessment and Plan   Left lower quadrant abdominal pain [R10 32]  1  Left lower quadrant abdominal pain  US abdomen and pelvis with transvaginal   2  Intermittent left upper quadrant abdominal pain  US abdomen and pelvis with transvaginal   3  RUQ abdominal pain  US abdomen and pelvis with transvaginal   4  Chronic bilateral low back pain without sciatica     5  Overweight with body mass index (BMI) of 26 to 26 9 in adult           Patient Instructions     Patient Instructions   Ultrasound order given  Anti-inflammatory medication (Tylenol/Ibuprofen) as discussed  Heat/massage/chiropractic care for back as directed  If symptoms worsen or any concerns please call office          Chief Complaint     Chief Complaint   Patient presents with    Multiple Concerns     Pain on both sides of lower abdomen since begining of the year pain of 7-8, is concerned about ovarian cancer, lower back pain pain 7-8         History of Present Illness       About 6 months ago she noticed sharp LLQ pain for 2-3 seconds, she reports it goes back and forth between RLQ and LLQ rates pain around 7-8/10  Reports bilateral back pain- increased her water intake  She is an  without an art room so she is constantly on/off the floor with her supplies  This morning she took 2 tylenol and did notice some improvement  Noticed some LUQ pain recently  She is most concerned about ovarian cancer  She had a very close friend recently die from ovarian cancer  Patient went through menopause around age 34  Review of Systems   Review of Systems   Constitutional: Negative for activity change, diaphoresis, fatigue and fever  HENT: Negative for congestion, facial swelling, hearing loss, rhinorrhea, sinus pressure, sinus pain, sneezing, sore throat and voice change      Eyes: Negative for discharge and visual disturbance  Respiratory: Negative for cough, choking, chest tightness, shortness of breath, wheezing and stridor  Cardiovascular: Negative for chest pain, palpitations and leg swelling  Gastrointestinal: Positive for abdominal pain  Negative for abdominal distention, constipation (normal bowel movements), diarrhea, nausea and vomiting  Endocrine: Negative for polydipsia, polyphagia and polyuria  Genitourinary: Negative for difficulty urinating, dysuria, frequency and urgency  Musculoskeletal: Positive for back pain (with ROM- lower)  Negative for arthralgias, gait problem, joint swelling, myalgias, neck pain and neck stiffness  Skin: Negative for color change, rash and wound  Neurological: Negative for dizziness, syncope, speech difficulty, weakness, light-headedness and headaches  Hematological: Negative for adenopathy  Does not bruise/bleed easily  Psychiatric/Behavioral: Negative for agitation, behavioral problems, confusion, hallucinations, sleep disturbance and suicidal ideas  The patient is not nervous/anxious            Current Medications       Current Outpatient Medications:     estradiol (ESTRACE) 0 1 mg/g vaginal cream, Insert 1 g into the vagina 2 (two) times a week At bedtime, Disp: 42 5 g, Rfl: 1    levothyroxine 50 mcg tablet, Take 1 tablet (50 mcg total) by mouth daily, Disp: 90 tablet, Rfl: 0    lisinopril (ZESTRIL) 10 mg tablet, Take 1 tablet (10 mg total) by mouth daily, Disp: 90 tablet, Rfl: 3    Multiple Vitamins-Minerals (CENTRUM SILVER 50+WOMEN PO), Take by mouth (Patient not taking: Reported on 6/25/2021), Disp: , Rfl:     valACYclovir (VALTREX) 500 mg tablet, Take 1 tablet (500 mg total) by mouth 2 (two) times a day for 3 days Prn outbreaks (Patient not taking: Reported on 10/1/2020), Disp: 6 tablet, Rfl: 1    Current Allergies     Allergies as of 06/25/2021    (No Known Allergies)            The following portions of the patient's history were reviewed and updated as appropriate: allergies, current medications, past family history, past medical history, past social history, past surgical history and problem list      Past Medical History:   Diagnosis Date    Depression     Genital herpes     Murmur        Past Surgical History:   Procedure Laterality Date    APPENDECTOMY      COLONOSCOPY      CYSTOSCOPY      TONSILLECTOMY         Family History   Problem Relation Age of Onset    Heart disease Mother     Diabetes Mother     Heart disease Father     No Known Problems Sister     No Known Problems Maternal Grandmother     No Known Problems Maternal Grandfather     No Known Problems Paternal Grandmother     No Known Problems Paternal Grandfather     Breast cancer Other     No Known Problems Maternal Aunt     No Known Problems Maternal Aunt          Medications have been verified  Objective   /72   Pulse 78   Temp 99 1 °F (37 3 °C) (Tympanic)   Resp 20   Ht 5' 3" (1 6 m)   Wt 67 1 kg (148 lb)   SpO2 98%   BMI 26 22 kg/m²        Physical Exam     Physical Exam  Vitals and nursing note reviewed  Constitutional:       General: She is not in acute distress  Appearance: Normal appearance  She is well-developed  She is not ill-appearing or diaphoretic  HENT:      Head: Normocephalic and atraumatic  Cardiovascular:      Rate and Rhythm: Normal rate and regular rhythm  Heart sounds: Normal heart sounds  No murmur heard  No friction rub  No gallop  Pulmonary:      Effort: Pulmonary effort is normal  No respiratory distress  Breath sounds: Normal breath sounds  No wheezing or rales  Abdominal:      General: Abdomen is flat  Bowel sounds are normal  There is no distension  Palpations: Abdomen is soft  There is no mass  Tenderness: There is no abdominal tenderness  There is no right CVA tenderness, left CVA tenderness, guarding or rebound     Musculoskeletal:         General: No tenderness or deformity  Normal range of motion  Cervical back: Normal range of motion  Lumbar back: No swelling, deformity, lacerations, spasms or tenderness (none at this time)  Normal range of motion  Back:    Skin:     General: Skin is warm and dry  Findings: No erythema or rash  Neurological:      Mental Status: She is alert and oriented to person, place, and time  Cranial Nerves: No cranial nerve deficit  Coordination: Coordination normal    Psychiatric:         Behavior: Behavior normal          Thought Content: Thought content normal          Judgment: Judgment normal        BMI Counseling: Body mass index is 26 22 kg/m²  The BMI is above normal  Nutrition recommendations include decreasing portion sizes, encouraging healthy choices of fruits and vegetables, decreasing fast food intake, consuming healthier snacks, limiting drinks that contain sugar, moderation in carbohydrate intake and increasing intake of lean protein  Exercise recommendations include exercising 3-5 times per week

## 2021-06-25 NOTE — PATIENT INSTRUCTIONS
Ultrasound order given  Anti-inflammatory medication (Tylenol/Ibuprofen) as discussed  Heat/massage/chiropractic care for back as directed  If symptoms worsen or any concerns please call office

## 2021-07-03 ENCOUNTER — HOSPITAL ENCOUNTER (OUTPATIENT)
Dept: ULTRASOUND IMAGING | Facility: HOSPITAL | Age: 61
Discharge: HOME | End: 2021-07-03
Payer: COMMERCIAL

## 2021-07-03 DIAGNOSIS — R10.32 LEFT LOWER QUADRANT ABDOMINAL PAIN: ICD-10-CM

## 2021-07-03 DIAGNOSIS — R10.11 RUQ ABDOMINAL PAIN: ICD-10-CM

## 2021-07-03 DIAGNOSIS — R10.12 INTERMITTENT LEFT UPPER QUADRANT ABDOMINAL PAIN: ICD-10-CM

## 2021-07-03 PROCEDURE — 76830 TRANSVAGINAL US NON-OB: CPT

## 2021-07-03 PROCEDURE — 76856 US EXAM PELVIC COMPLETE: CPT

## 2021-07-03 PROCEDURE — 76700 US EXAM ABDOM COMPLETE: CPT

## 2021-09-21 DIAGNOSIS — E03.9 HYPOTHYROIDISM, UNSPECIFIED TYPE: ICD-10-CM

## 2021-09-21 DIAGNOSIS — I10 HYPERTENSION, UNSPECIFIED TYPE: ICD-10-CM

## 2021-09-21 RX ORDER — LISINOPRIL 10 MG/1
10 TABLET ORAL DAILY
Qty: 90 TABLET | Refills: 3 | Status: SHIPPED | OUTPATIENT
Start: 2021-09-21

## 2021-09-21 RX ORDER — LEVOTHYROXINE SODIUM 0.05 MG/1
50 TABLET ORAL DAILY
Qty: 90 TABLET | Refills: 0 | Status: SHIPPED | OUTPATIENT
Start: 2021-09-21 | End: 2022-01-04 | Stop reason: SDUPTHER

## 2021-09-21 NOTE — TELEPHONE ENCOUNTER
Patient requesting refill(s) of: levothyroxine 50 mcg daily    Last filled: 2/11/2021 #90 x 0      Patient requesting refill(s) of: lisinopril 10 mg daily    Last filled: 5/18/2020 #90 x 3  Last appt:6/25/2021  Next appt: none  [facility]: [facility]

## 2021-10-26 ENCOUNTER — ANNUAL EXAM (OUTPATIENT)
Dept: OBGYN CLINIC | Facility: CLINIC | Age: 61
End: 2021-10-26
Payer: COMMERCIAL

## 2021-10-26 VITALS
DIASTOLIC BLOOD PRESSURE: 80 MMHG | SYSTOLIC BLOOD PRESSURE: 130 MMHG | BODY MASS INDEX: 27.82 KG/M2 | HEIGHT: 63 IN | WEIGHT: 157 LBS

## 2021-10-26 DIAGNOSIS — Z01.419 ENCOUNTER FOR GYNECOLOGICAL EXAMINATION WITHOUT ABNORMAL FINDING: Primary | ICD-10-CM

## 2021-10-26 DIAGNOSIS — N95.2 VAGINAL ATROPHY: ICD-10-CM

## 2021-10-26 DIAGNOSIS — Z12.31 ENCOUNTER FOR SCREENING MAMMOGRAM FOR MALIGNANT NEOPLASM OF BREAST: ICD-10-CM

## 2021-10-26 PROCEDURE — S0612 ANNUAL GYNECOLOGICAL EXAMINA: HCPCS | Performed by: OBSTETRICS & GYNECOLOGY

## 2021-10-26 RX ORDER — ESTRADIOL 0.1 MG/G
CREAM VAGINAL
Qty: 42.5 G | Refills: 1 | Status: SHIPPED | OUTPATIENT
Start: 2021-10-26 | End: 2022-06-20

## 2021-12-20 ENCOUNTER — HOSPITAL ENCOUNTER (OUTPATIENT)
Dept: MAMMOGRAPHY | Facility: HOSPITAL | Age: 61
Discharge: HOME | End: 2021-12-20
Payer: COMMERCIAL

## 2021-12-20 ENCOUNTER — VBI (OUTPATIENT)
Dept: ADMINISTRATIVE | Facility: OTHER | Age: 61
End: 2021-12-20

## 2021-12-20 VITALS — BODY MASS INDEX: 27.82 KG/M2 | HEIGHT: 63 IN | WEIGHT: 157 LBS

## 2021-12-20 DIAGNOSIS — Z12.31 ENCOUNTER FOR SCREENING MAMMOGRAM FOR MALIGNANT NEOPLASM OF BREAST: ICD-10-CM

## 2021-12-20 PROCEDURE — 77067 SCR MAMMO BI INCL CAD: CPT

## 2021-12-20 PROCEDURE — 77063 BREAST TOMOSYNTHESIS BI: CPT

## 2022-01-04 DIAGNOSIS — E03.9 HYPOTHYROIDISM, UNSPECIFIED TYPE: ICD-10-CM

## 2022-01-04 RX ORDER — LEVOTHYROXINE SODIUM 0.05 MG/1
50 TABLET ORAL DAILY
Qty: 90 TABLET | Refills: 0 | Status: SHIPPED | OUTPATIENT
Start: 2022-01-04 | End: 2022-04-23

## 2022-01-04 NOTE — TELEPHONE ENCOUNTER
Patient requesting refill(s) of:  Levothyroxine 50 mcg tab    Last filled: 9/21/21  Last appt:  6/25/21  Next appt:  NA  [facility]:  [facility]

## 2022-01-07 ENCOUNTER — TELEPHONE (OUTPATIENT)
Dept: FAMILY MEDICINE CLINIC | Facility: CLINIC | Age: 62
End: 2022-01-07

## 2022-01-07 DIAGNOSIS — R51.9 NONINTRACTABLE HEADACHE, UNSPECIFIED CHRONICITY PATTERN, UNSPECIFIED HEADACHE TYPE: Primary | ICD-10-CM

## 2022-01-07 DIAGNOSIS — R09.89 RUNNY NOSE: ICD-10-CM

## 2022-01-07 DIAGNOSIS — R53.83 FATIGUE, UNSPECIFIED TYPE: ICD-10-CM

## 2022-01-07 DIAGNOSIS — R05.9 COUGH: ICD-10-CM

## 2022-01-07 PROCEDURE — U0003 INFECTIOUS AGENT DETECTION BY NUCLEIC ACID (DNA OR RNA); SEVERE ACUTE RESPIRATORY SYNDROME CORONAVIRUS 2 (SARS-COV-2) (CORONAVIRUS DISEASE [COVID-19]), AMPLIFIED PROBE TECHNIQUE, MAKING USE OF HIGH THROUGHPUT TECHNOLOGIES AS DESCRIBED BY CMS-2020-01-R: HCPCS | Performed by: NURSE PRACTITIONER

## 2022-01-07 NOTE — TELEPHONE ENCOUNTER
Patient called due to being exposed to grand kamaljit who were around positive family member and now patient  Started with symptoms on 01/05/2022 with headache, runny nose and sore throat, cough   Will have a MA reach out to see what to do next [phone]

## 2022-01-10 LAB — SARS-COV-2 RNA RESP QL NAA+PROBE: NEGATIVE

## 2022-05-24 DIAGNOSIS — E03.9 HYPOTHYROIDISM, UNSPECIFIED TYPE: ICD-10-CM

## 2022-05-24 DIAGNOSIS — E03.9 ACQUIRED HYPOTHYROIDISM: ICD-10-CM

## 2022-05-24 DIAGNOSIS — R53.83 FATIGUE, UNSPECIFIED TYPE: Primary | ICD-10-CM

## 2022-05-24 NOTE — TELEPHONE ENCOUNTER
Called patient and left msg that she is due for an appt as well as lab work  Asked if she could please call our office back

## 2022-05-24 NOTE — TELEPHONE ENCOUNTER
Pt called and needs a refill on her levothyroxine 50 mcg takes 1 QD # 90    She was only given # 30 on the last refill     She only has 7 pills left    Lab last done 09/2020     Does she need an appointment and lab work for this medication    Call her at [phone] you can leave a message    Please advise

## 2022-05-26 ENCOUNTER — TELEPHONE (OUTPATIENT)
Dept: FAMILY MEDICINE CLINIC | Facility: CLINIC | Age: 62
End: 2022-05-26

## 2022-05-26 DIAGNOSIS — E03.9 HYPOTHYROIDISM, UNSPECIFIED TYPE: Primary | ICD-10-CM

## 2022-05-26 DIAGNOSIS — E03.9 ACQUIRED HYPOTHYROIDISM: ICD-10-CM

## 2022-05-26 RX ORDER — LEVOTHYROXINE SODIUM 0.05 MG/1
50 TABLET ORAL DAILY
Qty: 30 TABLET | Refills: 0 | Status: SHIPPED | OUTPATIENT
Start: 2022-05-26 | End: 2022-07-06

## 2022-05-26 NOTE — TELEPHONE ENCOUNTER
Patient requesting refill(s) of: levothyroxine 50 mcg daily    Last filled: 4/23/2022 #30 x 0  Last appt: 6/25/2021  Next appt: 6/20/2022  [facility]: [facility]

## 2022-05-27 ENCOUNTER — OFFICE VISIT (OUTPATIENT)
Dept: URGENT CARE | Facility: CLINIC | Age: 62
End: 2022-05-27
Payer: COMMERCIAL

## 2022-05-27 VITALS
DIASTOLIC BLOOD PRESSURE: 76 MMHG | SYSTOLIC BLOOD PRESSURE: 124 MMHG | BODY MASS INDEX: 28.35 KG/M2 | WEIGHT: 160 LBS | RESPIRATION RATE: 16 BRPM | TEMPERATURE: 98.5 F | HEIGHT: 63 IN | HEART RATE: 92 BPM | OXYGEN SATURATION: 99 %
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
## 2022-05-27 DIAGNOSIS — S61.451A CAT BITE OF RIGHT HAND, INITIAL ENCOUNTER: Primary | ICD-10-CM

## 2022-05-27 PROCEDURE — S9088 SERVICES PROVIDED IN URGENT: HCPCS | Performed by: NURSE PRACTITIONER

## 2022-05-27 PROCEDURE — 90471 IMMUNIZATION ADMIN: CPT | Performed by: NURSE PRACTITIONER

## 2022-05-27 PROCEDURE — 90715 TDAP VACCINE 7 YRS/> IM: CPT

## 2022-05-27 PROCEDURE — 99213 OFFICE O/P EST LOW 20 MIN: CPT | Performed by: NURSE PRACTITIONER

## 2022-05-27 RX ORDER — AMOXICILLIN AND CLAVULANATE POTASSIUM 875; 125 MG/1; MG/1
1 TABLET, FILM COATED ORAL EVERY 12 HOURS SCHEDULED
Qty: 14 TABLET | Refills: 0 | Status: SHIPPED | COMMUNITY
Start: 2022-05-27 | End: 2022-06-03

## 2022-05-27 RX ORDER — AMOXICILLIN AND CLAVULANATE POTASSIUM 875; 125 MG/1; MG/1
1 TABLET, FILM COATED ORAL EVERY 12 HOURS SCHEDULED
Qty: 14 TABLET | Refills: 0 | Status: SHIPPED | OUTPATIENT
Start: 2022-05-27 | End: 2022-05-27 | Stop reason: SDUPTHER

## 2022-05-27 NOTE — PROGRESS NOTES
[address]        NAME: Gunnar Javier is a 57 y o  female  : 1960    MRN: [id number]  DATE: May 27, 2022  TIME: 6:45 PM    Assessment and Plan   Cat bite of right hand, initial encounter Taty Mcclure  1  Cat bite of right hand, initial encounter  Tdap Vaccine greater than or equal to 6yo    amoxicillin-clavulanate (AUGMENTIN) 875-125 mg per tablet    DISCONTINUED: amoxicillin-clavulanate (AUGMENTIN) 875-125 mg per tablet     Wound soaked in betadine, cleaned with wound cleanser  bacitracin and bandage applied    Patient Instructions     Patient Instructions   Take antibiotic as directed  Keep clean and covered for a few days, apply bacitracin  If you develop increased pain, swelling, redness, streaking, fever, drainage or any new or concerning symptoms please return or proceed to ER        Follow up with PCP in 3-5 days  Proceed to  ER if symptoms worsen  Chief Complaint     Chief Complaint   Patient presents with    Cat Bite     Sisters cat bit her last night on her right hand  History of Present Illness       Cat Bite  This is a new problem  The current episode started yesterday  The problem has been gradually worsening  Associated symptoms include a rash  Pertinent negatives include no arthralgias, chest pain, chills, congestion, coughing, diaphoresis, fatigue, fever, headaches, joint swelling, myalgias, numbness or weakness  Nothing aggravates the symptoms  She has tried nothing for the symptoms  Cat is up to date on vaccinations  Patient's last tetanus was 2017  Review of Systems   Review of Systems   Constitutional: Negative for chills, diaphoresis, fatigue and fever  HENT: Negative for congestion  Respiratory: Negative for cough, chest tightness, shortness of breath, wheezing and stridor  Cardiovascular: Negative for chest pain and palpitations  Musculoskeletal: Negative for arthralgias, back pain, joint swelling and myalgias     Skin: Positive for rash and wound  Neurological: Negative for dizziness, weakness, light-headedness, numbness and headaches  Current Medications       Current Outpatient Medications:     amoxicillin-clavulanate (AUGMENTIN) 875-125 mg per tablet, Take 1 tablet by mouth every 12 (twelve) hours for 7 days, Disp: 14 tablet, Rfl: 0    levothyroxine 50 mcg tablet, Take 1 tablet (50 mcg total) by mouth daily, Disp: 30 tablet, Rfl: 0    lisinopril (ZESTRIL) 10 mg tablet, Take 1 tablet (10 mg total) by mouth daily, Disp: 90 tablet, Rfl: 3    estradiol (ESTRACE) 0 1 mg/g vaginal cream, Insert once a week at bedtime  If symptoms start to return, may increase to twice a week till those symptoms resolve   (Patient not taking: Reported on 5/27/2022), Disp: 42 5 g, Rfl: 1    Multiple Vitamins-Minerals (CENTRUM SILVER 50+WOMEN PO), Take by mouth (Patient not taking: No sig reported), Disp: , Rfl:     valACYclovir (VALTREX) 500 mg tablet, Take 1 tablet (500 mg total) by mouth 2 (two) times a day for 3 days Prn outbreaks (Patient not taking: Reported on 10/1/2020), Disp: 6 tablet, Rfl: 1    Current Allergies     Allergies as of 05/27/2022    (No Known Allergies)            The following portions of the patient's history were reviewed and updated as appropriate: allergies, current medications, past family history, past medical history, past social history, past surgical history and problem list      Past Medical History:   Diagnosis Date    Depression     Genital herpes     Murmur        Past Surgical History:   Procedure Laterality Date    APPENDECTOMY      COLONOSCOPY      CYSTOSCOPY      TONSILLECTOMY         Family History   Problem Relation Age of Onset    Heart disease Mother     Diabetes Mother     Heart disease Father     No Known Problems Sister     No Known Problems Maternal Grandmother     No Known Problems Maternal Grandfather     No Known Problems Paternal Grandmother     No Known Problems Paternal Grandfather  Breast cancer Other     No Known Problems Maternal Aunt     No Known Problems Maternal Aunt          Medications have been verified  Objective   /76   Pulse 92   Temp 98 5 °F (36 9 °C)   Resp 16   Ht 5' 3" (1 6 m)   Wt 72 6 kg (160 lb)   SpO2 99%   BMI 28 34 kg/m²   No LMP recorded  Patient is postmenopausal        Physical Exam     Physical Exam  Constitutional:       General: She is not in acute distress  Appearance: Normal appearance  She is not diaphoretic  Cardiovascular:      Rate and Rhythm: Normal rate and regular rhythm  Pulses: Normal pulses  Radial pulses are 2+ on the right side and 2+ on the left side  Heart sounds: Normal heart sounds, S1 normal and S2 normal    Pulmonary:      Effort: Pulmonary effort is normal       Breath sounds: Normal breath sounds and air entry  Chest:   Breasts:      Right: No axillary adenopathy  Lymphadenopathy:      Upper Body:      Right upper body: No axillary adenopathy  Skin:     General: Skin is warm and dry  Capillary Refill: Capillary refill takes less than 2 seconds  Findings: Rash and wound (2 superifical puncture wounds to right thumb  surrounding erythema extending up to right wrist  no streaking, bleeding or drainage noted) present  Comments: nv intact   Neurological:      Mental Status: She is alert

## 2022-05-27 NOTE — PATIENT INSTRUCTIONS
Take antibiotic as directed  Keep clean and covered for a few days, apply bacitracin   If you develop increased pain, swelling, redness, streaking, fever, drainage or any new or concerning symptoms please return or proceed to ER

## 2022-05-27 NOTE — TELEPHONE ENCOUNTER
Left msg for patient  Phone was making a noise when leaving a msg so i'm unsure if she will be able to understand it  Asked for her to give a call back  Will need to call back if we dont hear from her

## 2022-06-10 ENCOUNTER — RA CDI HCC (OUTPATIENT)
Dept: OTHER | Facility: HOSPITAL | Age: 62
End: 2022-06-10

## 2022-06-10 NOTE — PROGRESS NOTES
[facility]  [facility]       Chart reviewed, no opportunity found: CHART REVIEWED, NO OPPORTUNITY FOUND        Patients Insurance        Commercial Insurance: [employer]

## 2022-06-15 ENCOUNTER — APPOINTMENT (OUTPATIENT)
Dept: LAB | Facility: CLINIC | Age: 62
End: 2022-06-15
Payer: COMMERCIAL

## 2022-06-15 DIAGNOSIS — E03.9 HYPOTHYROIDISM, UNSPECIFIED TYPE: ICD-10-CM

## 2022-06-15 LAB
ALBUMIN SERPL BCP-MCNC: 3.7 G/DL (ref 3.5–5)
ALP SERPL-CCNC: 84 U/L (ref 46–116)
ALT SERPL W P-5'-P-CCNC: 25 U/L (ref 12–78)
ANION GAP SERPL CALCULATED.3IONS-SCNC: 3 MMOL/L (ref 4–13)
AST SERPL W P-5'-P-CCNC: 11 U/L (ref 5–45)
BASOPHILS # BLD AUTO: 0.03 THOUSANDS/ΜL (ref 0–0.1)
BASOPHILS NFR BLD AUTO: 1 % (ref 0–1)
BILIRUB SERPL-MCNC: 0.49 MG/DL (ref 0.2–1)
BUN SERPL-MCNC: 14 MG/DL (ref 5–25)
CALCIUM SERPL-MCNC: 9.1 MG/DL (ref 8.3–10.1)
CHLORIDE SERPL-SCNC: 107 MMOL/L (ref 100–108)
CHOLEST SERPL-MCNC: 239 MG/DL
CO2 SERPL-SCNC: 28 MMOL/L (ref 21–32)
CREAT SERPL-MCNC: 0.79 MG/DL (ref 0.6–1.3)
EOSINOPHIL # BLD AUTO: 0.09 THOUSAND/ΜL (ref 0–0.61)
EOSINOPHIL NFR BLD AUTO: 2 % (ref 0–6)
ERYTHROCYTE [DISTWIDTH] IN BLOOD BY AUTOMATED COUNT: 13.5 % (ref 11.6–15.1)
GFR SERPL CREATININE-BSD FRML MDRD: 79 ML/MIN/1.73SQ M
GLUCOSE P FAST SERPL-MCNC: 88 MG/DL (ref 65–99)
HCT VFR BLD AUTO: 43.4 % (ref 34.8–46.1)
HDLC SERPL-MCNC: 69 MG/DL
HGB BLD-MCNC: 14.2 G/DL (ref 11.5–15.4)
IMM GRANULOCYTES # BLD AUTO: 0.02 THOUSAND/UL (ref 0–0.2)
IMM GRANULOCYTES NFR BLD AUTO: 0 % (ref 0–2)
LDLC SERPL CALC-MCNC: 149 MG/DL (ref 0–100)
LYMPHOCYTES # BLD AUTO: 2.03 THOUSANDS/ΜL (ref 0.6–4.47)
LYMPHOCYTES NFR BLD AUTO: 41 % (ref 14–44)
MCH RBC QN AUTO: 28.9 PG (ref 26.8–34.3)
MCHC RBC AUTO-ENTMCNC: 32.7 G/DL (ref 31.4–37.4)
MCV RBC AUTO: 88 FL (ref 82–98)
MONOCYTES # BLD AUTO: 0.48 THOUSAND/ΜL (ref 0.17–1.22)
MONOCYTES NFR BLD AUTO: 10 % (ref 4–12)
NEUTROPHILS # BLD AUTO: 2.36 THOUSANDS/ΜL (ref 1.85–7.62)
NEUTS SEG NFR BLD AUTO: 46 % (ref 43–75)
NONHDLC SERPL-MCNC: 170 MG/DL
NRBC BLD AUTO-RTO: 0 /100 WBCS
PLATELET # BLD AUTO: 226 THOUSANDS/UL (ref 149–390)
PMV BLD AUTO: 8.6 FL (ref 8.9–12.7)
POTASSIUM SERPL-SCNC: 4.7 MMOL/L (ref 3.5–5.3)
PROT SERPL-MCNC: 7.1 G/DL (ref 6.4–8.2)
RBC # BLD AUTO: 4.92 MILLION/UL (ref 3.81–5.12)
SODIUM SERPL-SCNC: 138 MMOL/L (ref 136–145)
TRIGL SERPL-MCNC: 103 MG/DL
TSH SERPL DL<=0.05 MIU/L-ACNC: 2.78 UIU/ML (ref 0.45–4.5)
WBC # BLD AUTO: 5.01 THOUSAND/UL (ref 4.31–10.16)

## 2022-06-15 PROCEDURE — 80053 COMPREHEN METABOLIC PANEL: CPT

## 2022-06-15 PROCEDURE — 84443 ASSAY THYROID STIM HORMONE: CPT

## 2022-06-15 PROCEDURE — 36415 COLL VENOUS BLD VENIPUNCTURE: CPT

## 2022-06-15 PROCEDURE — 80061 LIPID PANEL: CPT

## 2022-06-15 PROCEDURE — 85025 COMPLETE CBC W/AUTO DIFF WBC: CPT

## 2022-06-20 ENCOUNTER — OFFICE VISIT (OUTPATIENT)
Dept: FAMILY MEDICINE CLINIC | Facility: CLINIC | Age: 62
End: 2022-06-20
Payer: COMMERCIAL

## 2022-06-20 VITALS
HEART RATE: 91 BPM | TEMPERATURE: 99 F | OXYGEN SATURATION: 98 % | WEIGHT: 162.8 LBS | DIASTOLIC BLOOD PRESSURE: 86 MMHG | SYSTOLIC BLOOD PRESSURE: 120 MMHG | RESPIRATION RATE: 17 BRPM | HEIGHT: 63 IN | BODY MASS INDEX: 28.84 KG/M2

## 2022-06-20 DIAGNOSIS — F43.0 STRESS REACTION: ICD-10-CM

## 2022-06-20 DIAGNOSIS — I10 PRIMARY HYPERTENSION: ICD-10-CM

## 2022-06-20 DIAGNOSIS — E03.9 ACQUIRED HYPOTHYROIDISM: ICD-10-CM

## 2022-06-20 DIAGNOSIS — G89.29 CHRONIC BILATERAL LOW BACK PAIN WITH LEFT-SIDED SCIATICA: ICD-10-CM

## 2022-06-20 DIAGNOSIS — Z00.00 ANNUAL PHYSICAL EXAM: Primary | ICD-10-CM

## 2022-06-20 DIAGNOSIS — E66.3 OVERWEIGHT WITH BODY MASS INDEX (BMI) OF 28 TO 28.9 IN ADULT: ICD-10-CM

## 2022-06-20 DIAGNOSIS — M54.42 CHRONIC BILATERAL LOW BACK PAIN WITH LEFT-SIDED SCIATICA: ICD-10-CM

## 2022-06-20 PROCEDURE — 3725F SCREEN DEPRESSION PERFORMED: CPT | Performed by: NURSE PRACTITIONER

## 2022-06-20 PROCEDURE — 99214 OFFICE O/P EST MOD 30 MIN: CPT | Performed by: NURSE PRACTITIONER

## 2022-06-20 PROCEDURE — 1036F TOBACCO NON-USER: CPT | Performed by: NURSE PRACTITIONER

## 2022-06-20 PROCEDURE — 3008F BODY MASS INDEX DOCD: CPT | Performed by: NURSE PRACTITIONER

## 2022-06-20 PROCEDURE — 99396 PREV VISIT EST AGE 40-64: CPT | Performed by: NURSE PRACTITIONER

## 2022-06-20 RX ORDER — ESCITALOPRAM OXALATE 5 MG/1
5 TABLET ORAL DAILY
Qty: 90 TABLET | Refills: 3 | Status: SHIPPED | OUTPATIENT
Start: 2022-06-20

## 2022-06-20 RX ORDER — CYCLOBENZAPRINE HCL 10 MG
10 TABLET ORAL 3 TIMES DAILY PRN
Qty: 30 TABLET | Refills: 2 | Status: SHIPPED | OUTPATIENT
Start: 2022-06-20

## 2022-06-20 NOTE — PATIENT INSTRUCTIONS
Get labs done before next visit  Start Lexapro as discussed- 8 week medcheck  Start Flexeril for muscle back pain as directed/discussed   1/2 tablet during the day if needed, full tablet at night if needed  Continue other medications as ordered

## 2022-06-20 NOTE — PROGRESS NOTES
[facility]        NAME: Thom Valentin is a 57 y o  female  : 1960    MRN: [id number]  DATE: 2022  TIME: 1:59 PM    Assessment and Plan   Acquired hypothyroidism [E03 9]  1  Acquired hypothyroidism     2  Primary hypertension     3  Stress reaction  escitalopram (LEXAPRO) 5 mg tablet   4  Chronic bilateral low back pain with left-sided sciatica  cyclobenzaprine (FLEXERIL) 10 mg tablet         Patient Instructions     Patient Instructions   Get labs done before next visit  Start Lexapro as discussed- 8 week medcheck  Start Flexeril for muscle back pain as directed/discussed  1/2 tablet during the day if needed, full tablet at night if needed  Continue other medications as ordered          Chief Complaint     Chief Complaint   Patient presents with    Follow-up    Back Pain         History of Present Illness       Here to discuss back pain and stress/unable to focus  Her mother is living with her and was just put on hospice because she does not want to follow the orders of the Pulmonary doctor  She is feeling like she can't focus on specific tasks and is unsure if this is from anxiety and depression  Reviewed recent labs- all questions answered    Reports bilateral lower back pain with movement  Improved with Tylenol/motrin       Review of Systems   Review of Systems   Constitutional: Negative for activity change, diaphoresis, fatigue and fever  HENT: Negative for congestion, facial swelling, hearing loss, rhinorrhea, sinus pressure, sinus pain, sneezing, sore throat and voice change  Eyes: Negative for discharge and visual disturbance  Respiratory: Negative for cough, choking, chest tightness, shortness of breath, wheezing and stridor  Cardiovascular: Negative for chest pain, palpitations and leg swelling  Gastrointestinal: Negative for abdominal distention, abdominal pain, constipation, diarrhea, nausea and vomiting     Endocrine: Negative for polydipsia, polyphagia and polyuria  Genitourinary: Negative for difficulty urinating, dysuria, frequency and urgency  Musculoskeletal: Positive for arthralgias and myalgias  Negative for back pain, gait problem, joint swelling, neck pain and neck stiffness  Skin: Negative for color change, rash and wound  Neurological: Negative for dizziness, syncope, speech difficulty, weakness, light-headedness and headaches  Hematological: Negative for adenopathy  Does not bruise/bleed easily  Psychiatric/Behavioral: Negative for agitation, behavioral problems, confusion, hallucinations, sleep disturbance and suicidal ideas  The patient is nervous/anxious            Current Medications       Current Outpatient Medications:     cyclobenzaprine (FLEXERIL) 10 mg tablet, Take 1 tablet (10 mg total) by mouth 3 (three) times a day as needed for muscle spasms, Disp: 30 tablet, Rfl: 2    escitalopram (LEXAPRO) 5 mg tablet, Take 1 tablet (5 mg total) by mouth daily, Disp: 90 tablet, Rfl: 3    levothyroxine 50 mcg tablet, Take 1 tablet (50 mcg total) by mouth daily, Disp: 30 tablet, Rfl: 0    lisinopril (ZESTRIL) 10 mg tablet, Take 1 tablet (10 mg total) by mouth daily, Disp: 90 tablet, Rfl: 3    Current Allergies     Allergies as of 06/20/2022    (No Known Allergies)            The following portions of the patient's history were reviewed and updated as appropriate: allergies, current medications, past family history, past medical history, past social history, past surgical history and problem list      Past Medical History:   Diagnosis Date    Genital herpes        Past Surgical History:   Procedure Laterality Date    APPENDECTOMY      COLONOSCOPY      CYSTOSCOPY      TONSILLECTOMY         Family History   Problem Relation Age of Onset    Heart disease Mother     Diabetes Mother     Heart disease Father     No Known Problems Sister     No Known Problems Maternal Grandmother     No Known Problems Maternal Grandfather     No Known Problems Paternal Grandmother     No Known Problems Paternal Grandfather     Breast cancer Other     No Known Problems Maternal Aunt     No Known Problems Maternal Aunt          Medications have been verified  Objective   /86 (BP Location: Left arm, Patient Position: Sitting, Cuff Size: Standard)   Pulse 91   Temp 99 °F (37 2 °C)   Resp 17   Ht 5' 3" (1 6 m)   Wt 73 8 kg (162 lb 12 8 oz)   SpO2 98%   BMI 28 84 kg/m²        Physical Exam     Physical Exam  Vitals and nursing note reviewed  Constitutional:       General: She is not in acute distress  Appearance: She is well-developed  She is not diaphoretic  HENT:      Right Ear: Tympanic membrane, ear canal and external ear normal  There is no impacted cerumen  Left Ear: Tympanic membrane, ear canal and external ear normal  There is no impacted cerumen  Neck:      Thyroid: No thyromegaly  Trachea: No tracheal deviation  Cardiovascular:      Rate and Rhythm: Normal rate and regular rhythm  Heart sounds: Normal heart sounds  Pulmonary:      Effort: Pulmonary effort is normal  No respiratory distress  Breath sounds: Normal breath sounds  No wheezing  Musculoskeletal:         General: Tenderness present  No deformity  Normal range of motion  Cervical back: Normal range of motion and neck supple  Back:       Right lower leg: No edema  Left lower leg: No edema  Skin:     General: Skin is warm and dry  Findings: No erythema or rash  Neurological:      Mental Status: She is alert and oriented to person, place, and time  Psychiatric:         Mood and Affect: Mood normal          Behavior: Behavior normal  Behavior is cooperative  Thought Content:  Thought content normal          Judgment: Judgment normal

## 2022-06-20 NOTE — PROGRESS NOTES
HPI:  Ryan Patel is a 57 y o  female here for her yearly health maintenance exam    Patient Active Problem List   Diagnosis    Hypertension    Hypothyroidism    Breast cancer screening by mammogram    Genital herpes     Past Medical History:   Diagnosis Date    Genital herpes        BMI Counseling: Body mass index is 28 84 kg/m²  The BMI is above normal  Nutrition recommendations include decreasing portion sizes, encouraging healthy choices of fruits and vegetables, decreasing fast food intake, consuming healthier snacks, limiting drinks that contain sugar, moderation in carbohydrate intake and increasing intake of lean protein  Exercise recommendations include exercising 3-5 times per week  Rationale for BMI follow-up plan is due to patient being overweight or obese  Depression Screening and Follow-up Plan: Patient was screened for depression during today's encounter  They screened negative with a PHQ-2 score of 0  PHQ-2/9 Depression Screening    Little interest or pleasure in doing things: 0 - not at all  Feeling down, depressed, or hopeless: 0 - not at all  PHQ-2 Score: 0  PHQ-2 Interpretation: Negative depression screen           Current Outpatient Medications   Medication Sig Dispense Refill    cyclobenzaprine (FLEXERIL) 10 mg tablet Take 1 tablet (10 mg total) by mouth 3 (three) times a day as needed for muscle spasms 30 tablet 2    escitalopram (LEXAPRO) 5 mg tablet Take 1 tablet (5 mg total) by mouth daily 90 tablet 3    levothyroxine 50 mcg tablet Take 1 tablet (50 mcg total) by mouth daily 30 tablet 0    lisinopril (ZESTRIL) 10 mg tablet Take 1 tablet (10 mg total) by mouth daily 90 tablet 3     No current facility-administered medications for this visit       No Known Allergies  Immunization History   Administered Date(s) Administered    INFLUENZA 12/19/2014, 12/31/2016, 11/01/2018    Influenza, injectable, quadrivalent, preservative free 0 5 mL 11/01/2018    Influenza, recombinant, quadrivalent,injectable, preservative free 10/01/2020    Tdap 02/08/2017, 05/27/2022    Zoster Vaccine Recombinant 05/19/2020, 07/14/2020       Patient Care Team:  Billy Garcia as PCP - General (Family Medicine)  Parmjit Veras DO as PCP - [address] [place] [address] ([facility])  [facility]    Review of Systems   Constitutional: Negative for activity change, diaphoresis, fatigue and fever  HENT: Negative for congestion, facial swelling, hearing loss, rhinorrhea, sinus pressure, sinus pain, sneezing, sore throat and voice change  Eyes: Negative for discharge and visual disturbance  Respiratory: Negative for cough, choking, chest tightness, shortness of breath, wheezing and stridor  Cardiovascular: Negative for chest pain, palpitations and leg swelling  Gastrointestinal: Negative for abdominal distention, abdominal pain, constipation, diarrhea, nausea and vomiting  Endocrine: Negative for polydipsia, polyphagia and polyuria  Genitourinary: Negative for difficulty urinating, dysuria, frequency and urgency  Musculoskeletal: Positive for arthralgias, back pain and myalgias  Negative for gait problem, joint swelling, neck pain and neck stiffness  Skin: Negative for color change, rash and wound  Neurological: Negative for dizziness, syncope, speech difficulty, weakness, light-headedness and headaches  Hematological: Negative for adenopathy  Does not bruise/bleed easily  Psychiatric/Behavioral: Negative for agitation, behavioral problems, confusion, hallucinations, sleep disturbance and suicidal ideas  The patient is nervous/anxious  Physical Exam :  Physical Exam  Vitals and nursing note reviewed  Constitutional:       General: She is not in acute distress  Appearance: She is well-developed  She is not diaphoretic     HENT:      Right Ear: Tympanic membrane, ear canal and external ear normal       Left Ear: Tympanic membrane, ear canal and external ear normal    Neck:      Thyroid: No thyromegaly  Vascular: No carotid bruit  Trachea: No tracheal deviation  Cardiovascular:      Rate and Rhythm: Normal rate and regular rhythm  Heart sounds: Normal heart sounds  Pulmonary:      Effort: Pulmonary effort is normal  No respiratory distress  Breath sounds: Normal breath sounds  No wheezing  Musculoskeletal:         General: Tenderness (see acute note) present  No deformity  Normal range of motion  Cervical back: Normal range of motion and neck supple  Right lower leg: No edema  Left lower leg: No edema  Skin:     General: Skin is warm and dry  Findings: No erythema or rash  Neurological:      Mental Status: She is alert and oriented to person, place, and time  Psychiatric:         Mood and Affect: Mood normal          Behavior: Behavior normal  Behavior is cooperative  Thought Content: Thought content normal          Judgment: Judgment normal            Reviewed Updated [facility] Prior Wellness Visits:   Last Health Maintenance visit information was reviewed, patient interviewed , no change since last HM visit no  Last HM visit information was reviewed, patient interviewed and updates made to the record today no    Assessment and Plan:  1  Annual physical exam     2  Acquired hypothyroidism     3  Primary hypertension     4  Stress reaction  escitalopram (LEXAPRO) 5 mg tablet   5  Chronic bilateral low back pain with left-sided sciatica  cyclobenzaprine (FLEXERIL) 10 mg tablet   6   Overweight with body mass index (BMI) of 28 to 28 9 in adult         Health Maintenance Due   Topic Date Due    COVID-19 Vaccine (1) Never done    HIV Screening  Never done    BMI: Followup Plan  06/25/2022

## 2022-08-23 ENCOUNTER — OFFICE VISIT (OUTPATIENT)
Dept: FAMILY MEDICINE CLINIC | Facility: CLINIC | Age: 62
End: 2022-08-23
Payer: COMMERCIAL

## 2022-08-23 VITALS
DIASTOLIC BLOOD PRESSURE: 70 MMHG | BODY MASS INDEX: 29.06 KG/M2 | HEART RATE: 98 BPM | RESPIRATION RATE: 20 BRPM | TEMPERATURE: 98 F | WEIGHT: 164 LBS | HEIGHT: 63 IN | SYSTOLIC BLOOD PRESSURE: 118 MMHG | OXYGEN SATURATION: 98 %

## 2022-08-23 DIAGNOSIS — F43.0 STRESS REACTION: Primary | ICD-10-CM

## 2022-08-23 DIAGNOSIS — R09.81 NASAL CONGESTION: ICD-10-CM

## 2022-08-23 DIAGNOSIS — J34.2 DEVIATED SEPTUM: ICD-10-CM

## 2022-08-23 DIAGNOSIS — E53.8 VITAMIN B12 DEFICIENCY: ICD-10-CM

## 2022-08-23 PROCEDURE — 99214 OFFICE O/P EST MOD 30 MIN: CPT | Performed by: NURSE PRACTITIONER

## 2022-08-23 PROCEDURE — 3725F SCREEN DEPRESSION PERFORMED: CPT | Performed by: NURSE PRACTITIONER

## 2022-08-23 RX ORDER — AZELASTINE 1 MG/ML
1 SPRAY, METERED NASAL 2 TIMES DAILY
Qty: 30 ML | Refills: 0 | Status: SHIPPED | OUTPATIENT
Start: 2022-08-23

## 2022-08-23 RX ORDER — ESCITALOPRAM OXALATE 10 MG/1
10 TABLET ORAL DAILY
Qty: 90 TABLET | Refills: 3 | Status: SHIPPED | OUTPATIENT
Start: 2022-08-23
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
## 2022-10-27 ENCOUNTER — ANNUAL EXAM (OUTPATIENT)
Dept: GYNECOLOGY | Facility: CLINIC | Age: 62
End: 2022-10-27

## 2022-10-27 VITALS
SYSTOLIC BLOOD PRESSURE: 116 MMHG | WEIGHT: 167.8 LBS | DIASTOLIC BLOOD PRESSURE: 78 MMHG | BODY MASS INDEX: 29.73 KG/M2 | HEIGHT: 63 IN

## 2022-10-27 DIAGNOSIS — Z11.51 SCREENING FOR HPV (HUMAN PAPILLOMAVIRUS): ICD-10-CM

## 2022-10-27 DIAGNOSIS — Z12.4 CERVICAL CANCER SCREENING: Primary | ICD-10-CM

## 2022-10-27 DIAGNOSIS — Z12.31 ENCOUNTER FOR SCREENING MAMMOGRAM FOR BREAST CANCER: ICD-10-CM

## 2022-10-27 DIAGNOSIS — Z78.0 ASYMPTOMATIC MENOPAUSE: ICD-10-CM

## 2022-10-27 DIAGNOSIS — Z01.419 ENCOUNTER FOR ANNUAL ROUTINE GYNECOLOGICAL EXAMINATION: ICD-10-CM

## 2022-10-27 PROCEDURE — G0476 HPV COMBO ASSAY CA SCREEN: HCPCS | Performed by: OBSTETRICS & GYNECOLOGY

## 2022-10-27 NOTE — PROGRESS NOTES
Assessment/Plan:    Pap and HPV done today    mammogram reviewed with her including breast density  RX given for December     Discussed self breast exams    colon cancer screening -colonoscopy done in 2019, recall in 5 years    Right sided pain - I do not believe this is a gyn etiology  Her pelvic exam is normal and the pain is much higher than her pelvis  I recommended that she speak with her family doctor regarding this  Baseline DEXA ordered for after she turns 63      discussed preventive care, regular exercise and a healthy diet      No problem-specific Assessment & Plan notes found for this encounter  Diagnoses and all orders for this visit:    Encounter for annual routine gynecological examination    Encounter for screening mammogram for breast cancer  -     Mammo screening bilateral w 3d & cad; Future    Asymptomatic menopause  -     DXA bone density spine hip and pelvis; Future          Subjective:      Patient ID: Fara López is a 57 y o  female  Patient here for yearly  Earlier this year, she started having pain on her right side  For a while, it was on both sides but then seemed to concentrate on the right  She had a pelvic ultrasound that was normal and had an abdominal ultrasound that showed some tiny gallbladder polyps that did not need follow-up  The pain resolved but now has recurred  It occurs about 5 times a week, occasionally more than once a day but it only lasts few seconds  She describes it as sharp and dull, not stabbing  It does not seem to be affected by activity or any specific foods  She has normal bowel and bladder function, no vaginal bleeding or hematuria  She is under a great deal of stress as 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 mother  Normal Pap, negative HPV in 2020   normal 3D mammogram in December with scattered fibroglandular densities and average risk  She will be due for a baseline DEXA in January        The following portions of the patient's history were reviewed and updated as appropriate: allergies, current medications, past family history, past medical history, past social history, past surgical history and problem list     Review of Systems   Constitutional: Negative  Gastrointestinal: Positive for abdominal pain  Genitourinary: Negative  Negative for hematuria, pelvic pain and vaginal bleeding  Objective: There were no vitals taken for this visit  Physical Exam  Vitals reviewed  Constitutional:       Appearance: She is well-developed  Neck:      Thyroid: No thyromegaly  Trachea: No tracheal deviation  Cardiovascular:      Rate and Rhythm: Normal rate and regular rhythm  Pulmonary:      Effort: Pulmonary effort is normal       Breath sounds: Normal breath sounds  Chest:   Breasts: Breasts are symmetrical       Right: No inverted nipple, mass, nipple discharge, skin change or tenderness  Left: No inverted nipple, mass, nipple discharge, skin change or tenderness  Abdominal:      General: There is no distension  Palpations: Abdomen is soft  There is no mass  Tenderness: There is no abdominal tenderness  Genitourinary:     Labia:         Right: No rash, tenderness, lesion or injury  Left: No rash, tenderness, lesion or injury  Vagina: Normal       Cervix: No cervical motion tenderness, discharge or friability  Adnexa:         Right: No mass, tenderness or fullness  Left: No mass, tenderness or fullness          Rectum: Normal

## 2022-11-04 LAB
LAB AP GYN PRIMARY INTERPRETATION: NORMAL
Lab: NORMAL

## 2023-02-22 ENCOUNTER — TELEPHONE (OUTPATIENT)
Dept: FAMILY MEDICINE CLINIC | Facility: CLINIC | Age: 63
End: 2023-02-22

## 2023-02-23 ENCOUNTER — CLINICAL SUPPORT (OUTPATIENT)
Dept: URGENT CARE | Facility: CLINIC | Age: 63
End: 2023-02-23

## 2023-02-23 DIAGNOSIS — Z01.818 PRE-OP TESTING: ICD-10-CM

## 2023-02-23 DIAGNOSIS — R94.31 ABNORMAL EKG: Primary | ICD-10-CM

## 2023-02-24 LAB
ATRIAL RATE: 74 BPM
P AXIS: 39 DEGREES
PR INTERVAL: 160 MS
QRS AXIS: -12 DEGREES
QRSD INTERVAL: 70 MS
QT INTERVAL: 388 MS
QTC INTERVAL: 430 MS
T WAVE AXIS: 21 DEGREES
VENTRICULAR RATE: 74 BPM

## 2023-02-27 ENCOUNTER — CONSULT (OUTPATIENT)
Dept: FAMILY MEDICINE CLINIC | Facility: CLINIC | Age: 63
End: 2023-02-27

## 2023-02-27 VITALS
HEIGHT: 63 IN | SYSTOLIC BLOOD PRESSURE: 132 MMHG | BODY MASS INDEX: 29.95 KG/M2 | WEIGHT: 169 LBS | HEART RATE: 91 BPM | TEMPERATURE: 99.4 F | OXYGEN SATURATION: 95 % | DIASTOLIC BLOOD PRESSURE: 82 MMHG | RESPIRATION RATE: 17 BRPM

## 2023-02-27 DIAGNOSIS — H26.9 CATARACT OF BOTH EYES, UNSPECIFIED CATARACT TYPE: Primary | ICD-10-CM

## 2023-02-27 DIAGNOSIS — Z12.31 ENCOUNTER FOR SCREENING MAMMOGRAM FOR MALIGNANT NEOPLASM OF BREAST: ICD-10-CM

## 2023-02-27 DIAGNOSIS — Z13.820 SCREENING FOR OSTEOPOROSIS: ICD-10-CM

## 2023-02-27 DIAGNOSIS — Z23 ENCOUNTER FOR IMMUNIZATION: ICD-10-CM

## 2023-02-27 DIAGNOSIS — Z01.818 PRE-OPERATIVE CLEARANCE: ICD-10-CM

## 2023-02-27 DIAGNOSIS — F33.9 DEPRESSION, RECURRENT (HCC): ICD-10-CM

## 2023-02-27 DIAGNOSIS — L57.0 ACTINIC KERATOSIS: ICD-10-CM

## 2023-02-27 DIAGNOSIS — H52.203 ASTIGMATISM OF BOTH EYES, UNSPECIFIED TYPE: ICD-10-CM

## 2023-02-27 DIAGNOSIS — Z00.00 ENCOUNTER FOR MEDICARE ANNUAL WELLNESS EXAM: ICD-10-CM

## 2023-02-27 DIAGNOSIS — F43.0 STRESS REACTION: ICD-10-CM

## 2023-02-27 NOTE — PROGRESS NOTES
[facility]        NAME: Evangelina Hassan is a 70 y o  female  : 1960    MRN: [id number]  DATE: 2023  TIME: 3:15 PM    Assessment and Plan   Cataract of both eyes, unspecified cataract type [H26 9]  1  Cataract of both eyes, unspecified cataract type        2  Astigmatism of both eyes, unspecified type        3  Stress reaction        4  Screening for osteoporosis  DXA bone density spine hip and pelvis      5  Encounter for screening mammogram for malignant neoplasm of breast  Mammo screening bilateral w 3d & cad      6  Depression, recurrent ([address] )        7  Actinic keratosis        8  Encounter for immunization  Pneumococcal Conjugate Vaccine 20-valent (Pcv20)      9  Encounter for Medicare annual wellness exam        10  Pre-operative clearance              Patient Instructions     Patient Instructions     Patient is cleared for the planned surgery  No pre op concerns   Borderline cardiovascular risk  You can continue to taper off Lexapro as discussed - you are currently taking the lowest dose of 5 mg, you can either cut the pills in half or take 1 5 mg every other day etc  Until completely off  Medicare Preventive Visit Patient Instructions  Thank you for completing your Welcome to Medicare Visit or Medicare Annual Wellness Visit today  Your next wellness visit will be due in one year (2024)  The screening/preventive services that you may require over the next 5-10 years are detailed below  Some tests may not apply to you based off risk factors and/or age  Screening tests ordered at today's visit but not completed yet may show as past due  Also, please note that scanned in results may not display below    Preventive Screenings:  Service Recommendations Previous Testing/Comments   Colorectal Cancer Screening  * Colonoscopy    * Fecal Occult Blood Test (FOBT)/Fecal Immunochemical Test (FIT)  * Fecal DNA/Cologuard Test  * Flexible Sigmoidoscopy Age: 39-70 years old Colonoscopy: every 10 years (may be performed more frequently if at higher risk)  OR  FOBT/FIT: every 1 year  OR  Cologuard: every 3 years  OR  Sigmoidoscopy: every 5 years  Screening may be recommended earlier than age 39 if at higher risk for colorectal cancer  Also, an individualized decision between you and your healthcare provider will decide whether screening between the ages of 74-80 would be appropriate  Colonoscopy: 04/09/2019  FOBT/FIT: Not on file  Cologuard: Not on file  Sigmoidoscopy: Not on file    Screening Current     Breast Cancer Screening Age: 36 years old  Frequency: every 1-2 years  Not required if history of left and right mastectomy Mammogram: 12/20/2021    Screening Current   Cervical Cancer Screening Between the ages of 21-29, pap smear recommended once every 3 years  Between the ages of 33-67, can perform pap smear with HPV co-testing every 5 years  Recommendations may differ for women with a history of total hysterectomy, cervical cancer, or abnormal pap smears in past  Pap Smear: 10/27/2022    Screening Not Indicated   Hepatitis C Screening Once for adults born between 1945 and 1965  More frequently in patients at high risk for Hepatitis C Hep C Antibody: 02/18/2019    Screening Current   Diabetes Screening 1-2 times per year if you're at risk for diabetes or have pre-diabetes Fasting glucose: 88 mg/dL (6/15/2022)  A1C: No results in last 5 years (No results in last 5 years)  Screening Current   Cholesterol Screening Once every 5 years if you don't have a lipid disorder  May order more often based on risk factors  Lipid panel: 06/15/2022    Screening Current     Other Preventive Screenings Covered by Medicare:  1  Abdominal Aortic Aneurysm (AAA) Screening: covered once if your at risk  You're considered to be at risk if you have a family history of AAA    2  Lung Cancer Screening: covers low dose CT scan once per year if you meet all of the following conditions: (1) Age 50-69; (2) No signs or symptoms of lung cancer; (3) Current smoker or have quit smoking within the last 15 years; (4) You have a tobacco smoking history of at least 20 pack years (packs per day multiplied by number of years you smoked); (5) You get a written order from a healthcare provider  3  Glaucoma Screening: covered annually if you're considered high risk: (1) You have diabetes OR (2) Family history of glaucoma OR (3)  aged 48 and older OR (3)  American aged 72 and older  3  Osteoporosis Screening: covered every 2 years if you meet one of the following conditions: (1) You're estrogen deficient and at risk for osteoporosis based off medical history and other findings; (2) Have a vertebral abnormality; (3) On glucocorticoid therapy for more than 3 months; (4) Have primary hyperparathyroidism; (5) On osteoporosis medications and need to assess response to drug therapy  · Last bone density test (DXA Scan): Not on file  5  HIV Screening: covered annually if you're between the age of 12-76  Also covered annually if you are younger than 13 and older than 72 with risk factors for HIV infection  For pregnant patients, it is covered up to 3 times per pregnancy      Immunizations:  Immunization Recommendations   Influenza Vaccine Annual influenza vaccination during flu season is recommended for all persons aged >= 6 months who do not have contraindications   Pneumococcal Vaccine   * Pneumococcal conjugate vaccine = PCV13 (Prevnar 13), PCV15 (Vaxneuvance), PCV20 (Prevnar 20)  * Pneumococcal polysaccharide vaccine = PPSV23 (Pneumovax) Adults 25-60 years old: 1-3 doses may be recommended based on certain risk factors  Adults 72 years old: 1-2 doses may be recommended based off what pneumonia vaccine you previously received   Hepatitis B Vaccine 3 dose series if at intermediate or high risk (ex: diabetes, end stage renal disease, liver disease)   Tetanus (Td) Vaccine - COST NOT COVERED BY MEDICARE PART B Following completion of primary series, a booster dose should be given every 10 years to maintain immunity against tetanus  Td may also be given as tetanus wound prophylaxis  Tdap Vaccine - COST NOT COVERED BY MEDICARE PART B Recommended at least once for all adults  For pregnant patients, recommended with each pregnancy  Shingles Vaccine (Shingrix) - COST NOT COVERED BY MEDICARE PART B  2 shot series recommended in those aged 48 and above     Health Maintenance Due:      Topic Date Due   • HIV Screening  Never done   • Breast Cancer Screening: Mammogram  12/20/2023   • Colorectal Cancer Screening  04/09/2024   • Hepatitis C Screening  Completed     Immunizations Due:      Topic Date Due   • COVID-19 Vaccine (4 - Booster for Michelle series) 12/01/2022     Advance Directives   What are advance directives? Advance directives are legal documents that state your wishes and plans for medical care  These plans are made ahead of time in case you lose your ability to make decisions for yourself  Advance directives can apply to any medical decision, such as the treatments you want, and if you want to donate organs  What are the types of advance directives? There are many types of advance directives, and each state has rules about how to use them  You may choose a combination of any of the following:  · Living will: This is a written record of the treatment you want  You can also choose which treatments you do not want, which to limit, and which to stop at a certain time  This includes surgery, medicine, IV fluid, and tube feedings  · Durable power of  for healthcare [facility]): This is a written record that states who you want to make healthcare choices for you when you are unable to make them for yourself  This person, called a proxy, is usually a family member or a friend  You may choose more than 1 proxy    · Do not resuscitate (DNR) order:  A DNR order is used in case your heart stops beating or you stop breathing  It is a request not to have certain forms of treatment, such as CPR  A DNR order may be included in other types of advance directives  · Medical directive: This covers the care that you want if you are in a coma, near death, or unable to make decisions for yourself  You can list the treatments you want for each condition  Treatment may include pain medicine, surgery, blood transfusions, dialysis, IV or tube feedings, and a ventilator (breathing machine)  · Values history: This document has questions about your views, beliefs, and how you feel and think about life  This information can help others choose the care that you would choose  Why are advance directives important? An advance directive helps you control your care  Although spoken wishes may be used, it is better to have your wishes written down  Spoken wishes can be misunderstood, or not followed  Treatments may be given even if you do not want them  An advance directive may make it easier for your family to make difficult choices about your care  Urinary Incontinence   Urinary incontinence (UI)  is when you lose control of your bladder  UI develops because your bladder cannot store or empty urine properly  The 3 most common types of UI are stress incontinence, urge incontinence, or both  Medicines:   · May be given to help strengthen your bladder control  Report any side effects of medication to your healthcare provider  Do pelvic muscle exercises often:  Your pelvic muscles help you stop urinating  Squeeze these muscles tight for 5 seconds, then relax for 5 seconds  Gradually work up to squeezing for 10 seconds  Do 3 sets of 15 repetitions a day, or as directed  This will help strengthen your pelvic muscles and improve bladder control  Train your bladder:  Go to the bathroom at set times, such as every 2 hours, even if you do not feel the urge to go  You can also try to hold your urine when you feel the urge to go   For example, hold your urine for 5 minutes when you feel the urge to go  As that becomes easier, hold your urine for 10 minutes  Self-care:   · Keep a UI record  Write down how often you leak urine and how much you leak  Make a note of what you were doing when you leaked urine  · Drink liquids as directed  You may need to limit the amount of liquid you drink to help control your urine leakage  Do not drink any liquid right before you go to bed  Limit or do not have drinks that contain caffeine or alcohol  · Prevent constipation  Eat a variety of high-fiber foods  Good examples are high-fiber cereals, beans, vegetables, and whole-grain breads  Walking is the best way to trigger your intestines to have a bowel movement  · Exercise regularly and maintain a healthy weight  Weight loss and exercise will decrease pressure on your bladder and help you control your leakage  · Use a catheter as directed  to help empty your bladder  A catheter is a tiny, plastic tube that is put into your bladder to drain your urine  · Go to behavior therapy as directed  Behavior therapy may be used to help you learn to control your urge to urinate  Weight Management   Why it is important to manage your weight:  Being overweight increases your risk of health conditions such as heart disease, high blood pressure, type 2 diabetes, and certain types of cancer  It can also increase your risk for osteoarthritis, sleep apnea, and other respiratory problems  Aim for a slow, steady weight loss  Even a small amount of weight loss can lower your risk of health problems  How to lose weight safely:  A safe and healthy way to lose weight is to eat fewer calories and get regular exercise  You can lose up about 1 pound a week by decreasing the number of calories you eat by 500 calories each day  Healthy meal plan for weight management:  A healthy meal plan includes a variety of foods, contains fewer calories, and helps you stay healthy   A healthy meal plan includes the following:  · Eat whole-grain foods more often  A healthy meal plan should contain fiber  Fiber is the part of grains, fruits, and vegetables that is not broken down by your body  Whole-grain foods are healthy and provide extra fiber in your diet  Some examples of whole-grain foods are whole-wheat breads and pastas, oatmeal, brown rice, and bulgur  · Eat a variety of vegetables every day  Include dark, leafy greens such as spinach, kale, barry greens, and mustard greens  Eat yellow and orange vegetables such as carrots, sweet potatoes, and winter squash  · Eat a variety of fruits every day  Choose fresh or canned fruit (canned in its own juice or light syrup) instead of juice  Fruit juice has very little or no fiber  · Eat low-fat dairy foods  Drink fat-free (skim) milk or 1% milk  Eat fat-free yogurt and low-fat cottage cheese  Try low-fat cheeses such as mozzarella and other reduced-fat cheeses  · Choose meat and other protein foods that are low in fat  Choose beans or other legumes such as split peas or lentils  Choose fish, skinless poultry (chicken or turkey), or lean cuts of red meat (beef or pork)  Before you cook meat or poultry, cut off any visible fat  · Use less fat and oil  Try baking foods instead of frying them  Add less fat, such as margarine, sour cream, regular salad dressing and mayonnaise to foods  Eat fewer high-fat foods  Some examples of high-fat foods include french fries, doughnuts, ice cream, and cakes  · Eat fewer sweets  Limit foods and drinks that are high in sugar  This includes candy, cookies, regular soda, and sweetened drinks  Exercise:  Exercise at least 30 minutes per day on most days of the week  Some examples of exercise include walking, biking, dancing, and swimming  You can also fit in more physical activity by taking the stairs instead of the elevator or parking farther away from stores   Ask your healthcare provider about the best exercise plan for you  © Copyright "[employer] 2018 Information is for End User's use only and may not be sold, redistributed or otherwise used for commercial purposes  All illustrations and images included in CareNotes® are the copyrighted property of A [employer] A M [employer] Inc  or Lovely Caceres             Chief Complaint     Chief Complaint   Patient presents with   • Pre-op Clearance   • Heartburn   • Arthritis         History of Present Illness       Working on decreasing Lexapro - was on it when her mother suddenly passed away but feels she is bale to cope and has a good outlook on life  Presurgical Evaluation    Subjective:     Patient ID: Patsy Horan is a 70 y o  female      Patient presents with:  Pre-op Clearance  Heartburn  Arthritis      [unfilled]    The following portions of the patient's history were reviewed and updated as appropriate: allergies, current medications, past family history, past medical history, past social history, past surgical history and problem list     Procedure date: 3 21 2023 & 4 18 2023    Surgeon:  Dr Kristina Ward  Planned procedure:  Bilateral cataract surgery  Diagnosis for procedure:  Cataract both eyes, Astigmatism both eyes    Prior anesthesia: Yes   General; Complications:  None / Tolerated well    CAD History: None       Pulmonary History: None    Renal history: None    Diabetes History:  None     Neurological History: None     On Immunosuppressant meds/biologics: No      [unfilled]     Current Outpatient Medications:  cyclobenzaprine (FLEXERIL) 10 mg tablet, Take 1 tablet (10 mg total) by mouth 3 (three) times a day as needed for muscle spasms, Disp: 30 tablet, Rfl: 2  escitalopram (LEXAPRO) 10 mg tablet, Take 1 tablet (10 mg total) by mouth daily, Disp: 90 tablet, Rfl: 3  Euthyrox 50 MCG tablet, Take 1 tablet by mouth once daily, Disp: 90 tablet, Rfl: 3  lisinopril (ZESTRIL) 10 mg tablet, Take 1 tablet (10 mg total) by mouth daily, Disp: 90 tablet, Rfl: 3  azelastine (ASTELIN) 0 1 % nasal spray, 1 spray into each nostril 2 (two) times a day Use in each nostril as directed (Patient not taking: Reported on 2023), Disp: 30 mL, Rfl: 0    No current facility-administered medications for this visit  Allergies on file:   Patient has no known allergies  Patient Active Problem List:     Hypertension     Hypothyroidism     Breast cancer screening by mammogram     Genital herpes     Stress reaction       Past Medical History:  No date: Genital herpes    Past Surgical History:  No date: APPENDECTOMY  No date: COLONOSCOPY  No date: CYSTOSCOPY  No date: TONSILLECTOMY    Review of patient's family history indicates:      Social History    Tobacco Use      Smoking status: Former        Types: Cigarettes        Quit date: 1988        Years since quittin 6      Smokeless tobacco: Never    Vaping Use      Vaping Use: Never used    Alcohol use: No    Drug use: No      Objective:    ---------------------------               23                      1510         ---------------------------   BP:          132/82         Pulse:         91           Resp:          17           Temp:   99 4 °F (37 4 °C)   SpO2:          95%          Weight: 76 7 kg (169 lb)    Height: 5' 2 5" (1 588 m)  ---------------------------    [unfilled]     Preop labs/testing available and reviewed: no               EKG yes    Echo no    Stress test/cath no    PFT/Hughson no    Functional capacity: Shovel snow                          6 Mets   Pick the highest level patient can comfortably perform   4 mets or greater for surgery    RCRI  High Risk surgery? 1 Point  CAD History:         1 Point   MI; Positive Stress Test; CP due to Mi;  Nitrate Usage to control Angina;  Pathologic Q wave on EKG  CHF Active:         1 Point   Pulm Edema; Paroxysmal Nocturnal Dyspnea;  Bibasilar Rales (crackles);S3; CHF on CXR  Cerebrovascular Disease (TIA or CVA):     1 Point  DM on Insulin:        1 Point  Serum Creat >2 0 mg/dl:       1 Point          Total Points: 0     Scorin: Class I, Very Low Risk (0 4%)     1: Class II, Low risk (0 9%)     2: Class III Moderate (6 6%)     3: Class IV High (>11%)      DIANA Risk:  GFR:        For PCP:  If GFR>60, Hold ACE/ARB/Diuretic on the day of surgery, and NSAIDS 10 days before  If GFR<45, Consider PRE and POST op Nephrology Consult  If 46 <GFR> 59 : Has Patient had DIANA in last 6 Months? no   If YES: Preop Nephrology consult   If No:  [address] Nephrology consult  Assessment/Plan:    Patient is medically optimized (cleared) for the planned procedure  Further testing/evaluation is not required  Postop concerns: no    Problem List Items Addressed This Visit    None    Diagnoses and associated orders for this visit:    • Cataract of both eyes, unspecified cataract type    • Astigmatism of both eyes, unspecified type       Outpatient Medications Marked as Taking for the 23 encounter (Consult) with KEM Quiros:  cyclobenzaprine (FLEXERIL) 10 mg tablet, per anesthesia guidelines   escitalopram (LEXAPRO) 10 mg tablet, per anesthesia guidelines   Euthyrox 50 MCG tablet, per anesthesia guidelines   lisinopril (ZESTRIL) 10 mg tablet, per anesthesia guidelines          NOTE: Please use the above to review important meds for your specialty, the remainder "per anesthesia Guidelines "    NOTE: Please place an Inbasket message for "[facility]" pool for complicated patients  Review of Systems   Review of Systems   Constitutional: Negative for activity change, diaphoresis, fatigue and fever  HENT: Negative for congestion, facial swelling, hearing loss, rhinorrhea, sinus pressure, sinus pain, sneezing, sore throat and voice change  Eyes: Positive for visual disturbance (B/L cataracts)  Negative for discharge  Respiratory: Negative for cough, choking, chest tightness, shortness of breath, wheezing and stridor  Cardiovascular: Negative for chest pain, palpitations and leg swelling  Gastrointestinal: Negative for abdominal distention, abdominal pain, constipation, diarrhea, nausea and vomiting  Endocrine: Negative for polydipsia, polyphagia and polyuria  Genitourinary: Negative for difficulty urinating, dysuria, frequency and urgency  Musculoskeletal: Negative for arthralgias, back pain, gait problem, joint swelling, myalgias, neck pain and neck stiffness  Skin: Negative for color change, rash and wound  Mole on left chest wall and lower back     Neurological: Negative for dizziness, syncope, speech difficulty, weakness, light-headedness and headaches  Hematological: Negative for adenopathy  Does not bruise/bleed easily  Psychiatric/Behavioral: Negative for agitation, behavioral problems, confusion, hallucinations, sleep disturbance and suicidal ideas  The patient is not nervous/anxious            Current Medications       Current Outpatient Medications:   •  cyclobenzaprine (FLEXERIL) 10 mg tablet, Take 1 tablet (10 mg total) by mouth 3 (three) times a day as needed for muscle spasms, Disp: 30 tablet, Rfl: 2  •  Euthyrox 50 MCG tablet, Take 1 tablet by mouth once daily, Disp: 90 tablet, Rfl: 3  •  lisinopril (ZESTRIL) 10 mg tablet, Take 1 tablet (10 mg total) by mouth daily, Disp: 90 tablet, Rfl: 3  •  azelastine (ASTELIN) 0 1 % nasal spray, 1 spray into each nostril 2 (two) times a day Use in each nostril as directed (Patient not taking: Reported on 2/27/2023), Disp: 30 mL, Rfl: 0    Current Allergies     Allergies as of 02/27/2023   • (No Known Allergies)            The following portions of the patient's history were reviewed and updated as appropriate: allergies, current medications, past family history, past medical history, past social history, past surgical history and problem list      Past Medical History:   Diagnosis Date   • Genital herpes        Past Surgical History:   Procedure Laterality Date   • APPENDECTOMY     • COLONOSCOPY     • CYSTOSCOPY     • TONSILLECTOMY         Family History   Problem Relation Age of Onset   • Heart disease Mother    • Diabetes Mother    • Heart disease Father    • No Known Problems Sister    • No Known Problems Maternal Grandmother    • No Known Problems Maternal Grandfather    • No Known Problems Paternal Grandmother    • No Known Problems Paternal Grandfather    • Breast cancer Other    • No Known Problems Maternal Aunt    • No Known Problems Maternal Aunt          Medications have been verified  Objective   /82   Pulse 91   Temp 99 4 °F (37 4 °C)   Resp 17   Ht 5' 2 5" (1 588 m)   Wt 76 7 kg (169 lb)   SpO2 95%   BMI 30 42 kg/m²        Physical Exam     Physical Exam  Vitals and nursing note reviewed  Constitutional:       General: She is not in acute distress  Appearance: She is well-developed  She is not diaphoretic  HENT:      Head: Normocephalic and atraumatic  Right Ear: Tympanic membrane, ear canal and external ear normal       Left Ear: Tympanic membrane, ear canal and external ear normal       Nose: Nose normal       Right Sinus: No maxillary sinus tenderness or frontal sinus tenderness  Left Sinus: No maxillary sinus tenderness or frontal sinus tenderness  Mouth/Throat:      Mouth: Mucous membranes are moist       Pharynx: Oropharynx is clear  Uvula midline  No oropharyngeal exudate  Eyes:      General:         Right eye: No discharge  Left eye: No discharge  Conjunctiva/sclera: Conjunctivae normal       Pupils: Pupils are equal, round, and reactive to light  Neck:      Thyroid: No thyromegaly  Trachea: No tracheal deviation  Cardiovascular:      Rate and Rhythm: Normal rate and regular rhythm  Heart sounds: Normal heart sounds  No murmur heard  No friction rub  No gallop  Pulmonary:      Effort: Pulmonary effort is normal  No respiratory distress        Breath sounds: Normal breath sounds  No wheezing or rales  Abdominal:      General: Bowel sounds are normal  There is no distension  Palpations: Abdomen is soft  There is no mass  Tenderness: There is no abdominal tenderness  There is no guarding or rebound  Musculoskeletal:         General: No tenderness or deformity  Normal range of motion  Cervical back: Normal range of motion and neck supple  Lymphadenopathy:      Cervical: No cervical adenopathy  Skin:     General: Skin is warm and dry  Findings: No erythema or rash  Neurological:      Mental Status: She is alert and oriented to person, place, and time  Cranial Nerves: No cranial nerve deficit  Coordination: Coordination normal    Psychiatric:         Speech: Speech normal          Behavior: Behavior normal          Thought Content:  Thought content normal          Judgment: Judgment normal

## 2023-02-27 NOTE — PATIENT INSTRUCTIONS
Patient is cleared for the planned surgery  No pre op concerns   Borderline cardiovascular risk  You can continue to taper off Lexapro as discussed - you are currently taking the lowest dose of 5 mg, you can either cut the pills in half or take 1 5 mg every other day etc  Until completely off  Medicare Preventive Visit Patient Instructions  Thank you for completing your Welcome to Medicare Visit or Medicare Annual Wellness Visit today  Your next wellness visit will be due in one year (2/28/2024)  The screening/preventive services that you may require over the next 5-10 years are detailed below  Some tests may not apply to you based off risk factors and/or age  Screening tests ordered at today's visit but not completed yet may show as past due  Also, please note that scanned in results may not display below  Preventive Screenings:  Service Recommendations Previous Testing/Comments   Colorectal Cancer Screening  * Colonoscopy    * Fecal Occult Blood Test (FOBT)/Fecal Immunochemical Test (FIT)  * Fecal DNA/Cologuard Test  * Flexible Sigmoidoscopy Age: 39-70 years old   Colonoscopy: every 10 years (may be performed more frequently if at higher risk)  OR  FOBT/FIT: every 1 year  OR  Cologuard: every 3 years  OR  Sigmoidoscopy: every 5 years  Screening may be recommended earlier than age 39 if at higher risk for colorectal cancer  Also, an individualized decision between you and your healthcare provider will decide whether screening between the ages of 74-80 would be appropriate  Colonoscopy: 04/09/2019  FOBT/FIT: Not on file  Cologuard: Not on file  Sigmoidoscopy: Not on file    Screening Current     Breast Cancer Screening Age: 36 years old  Frequency: every 1-2 years  Not required if history of left and right mastectomy Mammogram: 12/20/2021    Screening Current   Cervical Cancer Screening Between the ages of 21-29, pap smear recommended once every 3 years     Between the ages of 33-67, can perform pap smear with HPV co-testing every 5 years  Recommendations may differ for women with a history of total hysterectomy, cervical cancer, or abnormal pap smears in past  Pap Smear: 10/27/2022    Screening Not Indicated   Hepatitis C Screening Once for adults born between 1945 and 1965  More frequently in patients at high risk for Hepatitis C Hep C Antibody: 02/18/2019    Screening Current   Diabetes Screening 1-2 times per year if you're at risk for diabetes or have pre-diabetes Fasting glucose: 88 mg/dL (6/15/2022)  A1C: No results in last 5 years (No results in last 5 years)  Screening Current   Cholesterol Screening Once every 5 years if you don't have a lipid disorder  May order more often based on risk factors  Lipid panel: 06/15/2022    Screening Current     Other Preventive Screenings Covered by Medicare:  1  Abdominal Aortic Aneurysm (AAA) Screening: covered once if your at risk  You're considered to be at risk if you have a family history of AAA  2  Lung Cancer Screening: covers low dose CT scan once per year if you meet all of the following conditions: (1) Age 50-69; (2) No signs or symptoms of lung cancer; (3) Current smoker or have quit smoking within the last 15 years; (4) You have a tobacco smoking history of at least 20 pack years (packs per day multiplied by number of years you smoked); (5) You get a written order from a healthcare provider  3  Glaucoma Screening: covered annually if you're considered high risk: (1) You have diabetes OR (2) Family history of glaucoma OR (3)  aged 48 and older OR (3)  American aged 72 and older  3   Osteoporosis Screening: covered every 2 years if you meet one of the following conditions: (1) You're estrogen deficient and at risk for osteoporosis based off medical history and other findings; (2) Have a vertebral abnormality; (3) On glucocorticoid therapy for more than 3 months; (4) Have primary hyperparathyroidism; (5) On osteoporosis medications and need to assess response to drug therapy  · Last bone density test (DXA Scan): Not on file  5  HIV Screening: covered annually if you're between the age of 12-76  Also covered annually if you are younger than 13 and older than 72 with risk factors for HIV infection  For pregnant patients, it is covered up to 3 times per pregnancy  Immunizations:  Immunization Recommendations   Influenza Vaccine Annual influenza vaccination during flu season is recommended for all persons aged >= 6 months who do not have contraindications   Pneumococcal Vaccine   * Pneumococcal conjugate vaccine = PCV13 (Prevnar 13), PCV15 (Vaxneuvance), PCV20 (Prevnar 20)  * Pneumococcal polysaccharide vaccine = PPSV23 (Pneumovax) Adults 25-60 years old: 1-3 doses may be recommended based on certain risk factors  Adults 72 years old: 1-2 doses may be recommended based off what pneumonia vaccine you previously received   Hepatitis B Vaccine 3 dose series if at intermediate or high risk (ex: diabetes, end stage renal disease, liver disease)   Tetanus (Td) Vaccine - COST NOT COVERED BY MEDICARE PART B Following completion of primary series, a booster dose should be given every 10 years to maintain immunity against tetanus  Td may also be given as tetanus wound prophylaxis  Tdap Vaccine - COST NOT COVERED BY MEDICARE PART B Recommended at least once for all adults  For pregnant patients, recommended with each pregnancy  Shingles Vaccine (Shingrix) - COST NOT COVERED BY MEDICARE PART B  2 shot series recommended in those aged 48 and above     Health Maintenance Due:      Topic Date Due   • HIV Screening  Never done   • Breast Cancer Screening: Mammogram  12/20/2023   • Colorectal Cancer Screening  04/09/2024   • Hepatitis C Screening  Completed     Immunizations Due:      Topic Date Due   • COVID-19 Vaccine (4 - Booster for Michelle series) 12/01/2022     Advance Directives   What are advance directives?   Advance directives are legal documents that state your wishes and plans for medical care  These plans are made ahead of time in case you lose your ability to make decisions for yourself  Advance directives can apply to any medical decision, such as the treatments you want, and if you want to donate organs  What are the types of advance directives? There are many types of advance directives, and each state has rules about how to use them  You may choose a combination of any of the following:  · Living will: This is a written record of the treatment you want  You can also choose which treatments you do not want, which to limit, and which to stop at a certain time  This includes surgery, medicine, IV fluid, and tube feedings  · Durable power of  for healthcare [facility]): This is a written record that states who you want to make healthcare choices for you when you are unable to make them for yourself  This person, called a proxy, is usually a family member or a friend  You may choose more than 1 proxy  · Do not resuscitate (DNR) order:  A DNR order is used in case your heart stops beating or you stop breathing  It is a request not to have certain forms of treatment, such as CPR  A DNR order may be included in other types of advance directives  · Medical directive: This covers the care that you want if you are in a coma, near death, or unable to make decisions for yourself  You can list the treatments you want for each condition  Treatment may include pain medicine, surgery, blood transfusions, dialysis, IV or tube feedings, and a ventilator (breathing machine)  · Values history: This document has questions about your views, beliefs, and how you feel and think about life  This information can help others choose the care that you would choose  Why are advance directives important? An advance directive helps you control your care  Although spoken wishes may be used, it is better to have your wishes written down   Spoken wishes can be misunderstood, or not followed  Treatments may be given even if you do not want them  An advance directive may make it easier for your family to make difficult choices about your care  Urinary Incontinence   Urinary incontinence (UI)  is when you lose control of your bladder  UI develops because your bladder cannot store or empty urine properly  The 3 most common types of UI are stress incontinence, urge incontinence, or both  Medicines:   · May be given to help strengthen your bladder control  Report any side effects of medication to your healthcare provider  Do pelvic muscle exercises often:  Your pelvic muscles help you stop urinating  Squeeze these muscles tight for 5 seconds, then relax for 5 seconds  Gradually work up to squeezing for 10 seconds  Do 3 sets of 15 repetitions a day, or as directed  This will help strengthen your pelvic muscles and improve bladder control  Train your bladder:  Go to the bathroom at set times, such as every 2 hours, even if you do not feel the urge to go  You can also try to hold your urine when you feel the urge to go  For example, hold your urine for 5 minutes when you feel the urge to go  As that becomes easier, hold your urine for 10 minutes  Self-care:   · Keep a UI record  Write down how often you leak urine and how much you leak  Make a note of what you were doing when you leaked urine  · Drink liquids as directed  You may need to limit the amount of liquid you drink to help control your urine leakage  Do not drink any liquid right before you go to bed  Limit or do not have drinks that contain caffeine or alcohol  · Prevent constipation  Eat a variety of high-fiber foods  Good examples are high-fiber cereals, beans, vegetables, and whole-grain breads  Walking is the best way to trigger your intestines to have a bowel movement  · Exercise regularly and maintain a healthy weight    Weight loss and exercise will decrease pressure on your bladder and help you control your leakage  · Use a catheter as directed  to help empty your bladder  A catheter is a tiny, plastic tube that is put into your bladder to drain your urine  · Go to behavior therapy as directed  Behavior therapy may be used to help you learn to control your urge to urinate  Weight Management   Why it is important to manage your weight:  Being overweight increases your risk of health conditions such as heart disease, high blood pressure, type 2 diabetes, and certain types of cancer  It can also increase your risk for osteoarthritis, sleep apnea, and other respiratory problems  Aim for a slow, steady weight loss  Even a small amount of weight loss can lower your risk of health problems  How to lose weight safely:  A safe and healthy way to lose weight is to eat fewer calories and get regular exercise  You can lose up about 1 pound a week by decreasing the number of calories you eat by 500 calories each day  Healthy meal plan for weight management:  A healthy meal plan includes a variety of foods, contains fewer calories, and helps you stay healthy  A healthy meal plan includes the following:  · Eat whole-grain foods more often  A healthy meal plan should contain fiber  Fiber is the part of grains, fruits, and vegetables that is not broken down by your body  Whole-grain foods are healthy and provide extra fiber in your diet  Some examples of whole-grain foods are whole-wheat breads and pastas, oatmeal, brown rice, and bulgur  · Eat a variety of vegetables every day  Include dark, leafy greens such as spinach, kale, barry greens, and mustard greens  Eat yellow and orange vegetables such as carrots, sweet potatoes, and winter squash  · Eat a variety of fruits every day  Choose fresh or canned fruit (canned in its own juice or light syrup) instead of juice  Fruit juice has very little or no fiber  · Eat low-fat dairy foods  Drink fat-free (skim) milk or 1% milk   Eat fat-free yogurt and low-fat cottage cheese  Try low-fat cheeses such as mozzarella and other reduced-fat cheeses  · Choose meat and other protein foods that are low in fat  Choose beans or other legumes such as split peas or lentils  Choose fish, skinless poultry (chicken or turkey), or lean cuts of red meat (beef or pork)  Before you cook meat or poultry, cut off any visible fat  · Use less fat and oil  Try baking foods instead of frying them  Add less fat, such as margarine, sour cream, regular salad dressing and mayonnaise to foods  Eat fewer high-fat foods  Some examples of high-fat foods include french fries, doughnuts, ice cream, and cakes  · Eat fewer sweets  Limit foods and drinks that are high in sugar  This includes candy, cookies, regular soda, and sweetened drinks  Exercise:  Exercise at least 30 minutes per day on most days of the week  Some examples of exercise include walking, biking, dancing, and swimming  You can also fit in more physical activity by taking the stairs instead of the elevator or parking farther away from stores  Ask your healthcare provider about the best exercise plan for you  © Copyright [employer] 2018 Information is for End User's use only and may not be sold, redistributed or otherwise used for commercial purposes   All illustrations and images included in CareNotes® are the copyrighted property of A D A M , [employer]  or [address] [place] [address] [facility]

## 2023-02-27 NOTE — PROGRESS NOTES
Assessment and Plan:     Problem List Items Addressed This Visit        Other    Stress reaction    Depression, recurrent ([address] )   Other Visit Diagnoses     Encounter for Medicare annual wellness exam    -  Primary    Cataract of both eyes, unspecified cataract type        Astigmatism of both eyes, unspecified type        Screening for osteoporosis        Relevant Orders    DXA bone density spine hip and pelvis    Encounter for screening mammogram for malignant neoplasm of breast        Relevant Orders    Mammo screening bilateral w 3d & cad    Actinic keratosis        Encounter for immunization        Relevant Orders    Pneumococcal Conjugate Vaccine 20-valent (Pcv20) (Completed)        BMI Counseling: Body mass index is 30 42 kg/m²  The BMI is above normal  Nutrition recommendations include decreasing portion sizes, encouraging healthy choices of fruits and vegetables, decreasing fast food intake, consuming healthier snacks, limiting drinks that contain sugar, moderation in carbohydrate intake and increasing intake of lean protein  Exercise recommendations include exercising 3-5 times per week  Rationale for BMI follow-up plan is due to patient being overweight or obese  Preventive health issues were discussed with patient, and age appropriate screening tests were ordered as noted in patient's After Visit Summary  Personalized health advice and appropriate referrals for health education or preventive services given if needed, as noted in patient's After Visit Summary        Patient presents for a Medicare Wellness Visit    Patient Care Team:  Brandon Gibbs as PCP - General (Family Medicine)  Pat Hood DO as PCP - [address] [place] [address] (RTE)  Cassie Pineda MD as PCP - [facility] (RTE)         Problem List:     Patient Active Problem List   Diagnosis   • Hypertension   • Hypothyroidism   • Breast cancer screening by mammogram   • Genital herpes   • Stress reaction   • Depression, recurrent [facility])      Past Medical and Surgical History:     Past Medical History:   Diagnosis Date   • Genital herpes      Past Surgical History:   Procedure Laterality Date   • APPENDECTOMY     • COLONOSCOPY     • CYSTOSCOPY     • TONSILLECTOMY        Family History:     Family History   Problem Relation Age of Onset   • Heart disease Mother    • Diabetes Mother    • Heart disease Father    • No Known Problems Sister    • No Known Problems Maternal Grandmother    • No Known Problems Maternal Grandfather    • No Known Problems Paternal Grandmother    • No Known Problems Paternal Grandfather    • Breast cancer Other    • No Known Problems Maternal Aunt    • No Known Problems Maternal Aunt       Social History:     Social History     Socioeconomic History   • Marital status: Single     Spouse name: None   • Number of children: None   • Years of education: None   • Highest education level: None   Occupational History   • Occupation:    Tobacco Use   • Smoking status: Former     Types: Cigarettes     Quit date: 1988     Years since quittin 6   • Smokeless tobacco: Never   Vaping Use   • Vaping Use: Never used   Substance and Sexual Activity   • Alcohol use: No   • Drug use: No   • Sexual activity: Not Currently   Other Topics Concern   • None   Social History Narrative   • None     Social Determinants of Health     Financial Resource Strain: Low Risk    • Difficulty of Paying Living Expenses: Not hard at all   Food Insecurity: Not on file   Transportation Needs: No Transportation Needs   • Lack of Transportation (Medical): No   • Lack of Transportation (Non-Medical):  No   Physical Activity: Not on file   Stress: Not on file   Social Connections: Not on file   Intimate Partner Violence: Not on file   Housing Stability: Not on file      Medications and Allergies:     Current Outpatient Medications   Medication Sig Dispense Refill   • cyclobenzaprine (FLEXERIL) 10 mg tablet Take 1 tablet (10 mg total) by mouth 3 (three) times a day as needed for muscle spasms 30 tablet 2   • Euthyrox 50 MCG tablet Take 1 tablet by mouth once daily 90 tablet 3   • lisinopril (ZESTRIL) 10 mg tablet Take 1 tablet (10 mg total) by mouth daily 90 tablet 3   • azelastine (ASTELIN) 0 1 % nasal spray 1 spray into each nostril 2 (two) times a day Use in each nostril as directed (Patient not taking: Reported on 2/27/2023) 30 mL 0     No current facility-administered medications for this visit  No Known Allergies   Immunizations:     Immunization History   Administered Date(s) Administered   • COVID-19 J&J (Michelle) vaccine 0 5 mL 03/27/2021   • COVID-19 MODERNA VACC 0 5 ML IM 11/07/2021, 06/06/2022   • COVID-19 Moderna Vac BIVALENT 12 Yr+ IM (BOOSTER ONLY) 0 5 ML 10/06/2022   • INFLUENZA 12/19/2014, 12/31/2016, 11/01/2018, 12/31/2021, 10/06/2022   • Influenza, injectable, quadrivalent, preservative free 0 5 mL 11/01/2018   • Influenza, recombinant, quadrivalent,injectable, preservative free 10/01/2020   • Pneumococcal Conjugate Vaccine 20-valent (Pcv20), Polysace 02/27/2023   • Tdap 02/08/2017, 05/27/2022   • Zoster Vaccine Recombinant 05/19/2020, 07/14/2020      Health Maintenance:         Topic Date Due   • HIV Screening  Never done   • Breast Cancer Screening: Mammogram  12/20/2023   • Colorectal Cancer Screening  04/09/2024   • Hepatitis C Screening  Completed         Topic Date Due   • COVID-19 Vaccine (4 - Booster for Michelle series) 12/01/2022      Medicare Screening Tests and Risk Assessments:     Laquita Marquez is here for her Welcome to Medicare visit  Health Risk Assessment:   Patient rates overall health as good  Patient feels that their physical health rating is slightly worse  Patient is very satisfied with their life  Eyesight was rated as slightly worse  Hearing was rated as slightly worse  Patient feels that their emotional and mental health rating is same  Patients states they are never, rarely angry   Patient states they are often unusually tired/fatigued  Pain experienced in the last 7 days has been some  Patient's pain rating has been 5/10  Patient states that she has experienced no weight loss or gain in last 6 months  Depression Screening:   PHQ-2 Score: 0      Fall Risk Screening: In the past year, patient has experienced: no history of falling in past year      Urinary Incontinence Screening:   Patient has leaked urine accidently in the last six months  Home Safety:  Patient does not have trouble with stairs inside or outside of their home  Patient has working smoke alarms and has no working carbon monoxide detector  Home safety hazards include: none  Nutrition:   Current diet is Regular  Medications:   Patient is currently taking over-the-counter supplements  OTC medications include: see medication list  Patient is able to manage medications  Activities of Daily Living (ADLs)/Instrumental Activities of Daily Living (IADLs):   Walk and transfer into and out of bed and chair?: Yes  Dress and groom yourself?: Yes    Bathe or shower yourself?: Yes    Feed yourself? Yes  Do your laundry/housekeeping?: Yes  Manage your money, pay your bills and track your expenses?: Yes  Make your own meals?: Yes    Do your own shopping?: Yes    Previous Hospitalizations:   Any hospitalizations or ED visits within the last 12 months?: No      Advance Care Planning:   Living will: Yes    Durable POA for healthcare:  Yes    Advanced directive: Yes    Five wishes given: No      PREVENTIVE SCREENINGS      Cardiovascular Screening:    General: Screening Current      Diabetes Screening:     General: Screening Current      Colorectal Cancer Screening:     General: Screening Current      Breast Cancer Screening:     General: Screening Current    Due for: Mammogram        Cervical Cancer Screening:    General: Screening Not Indicated      Osteoporosis Screening:      Due for: DXA Axial      Lung Cancer Screening:     General: Screening Not Indicated      Hepatitis C Screening:    General: Screening Current    Screening, Brief Intervention, and Referral to Treatment (SBIRT)    Screening  Typical number of drinks in a day: 0  Typical number of drinks in a week: 0  Interpretation: Low risk drinking behavior  Single Item Drug Screening:  How often have you used an illegal drug (including marijuana) or a prescription medication for non-medical reasons in the past year? never    Single Item Drug Screen Score: 0  Interpretation: Negative screen for possible drug use disorder    No results found  Physical Exam:     /82   Pulse 91   Temp 99 4 °F (37 4 °C)   Resp 17   Ht 5' 2 5" (1 588 m)   Wt 76 7 kg (169 lb)   SpO2 95%   BMI 30 42 kg/m²     Physical Exam  Vitals and nursing note reviewed  Constitutional:       General: She is not in acute distress  Appearance: She is well-developed  She is not diaphoretic  HENT:      Right Ear: Tympanic membrane, ear canal and external ear normal       Left Ear: Tympanic membrane, ear canal and external ear normal       Nose: Nose normal       Mouth/Throat:      Mouth: Mucous membranes are moist       Pharynx: Oropharynx is clear  Eyes:      Conjunctiva/sclera: Conjunctivae normal       Pupils: Pupils are equal, round, and reactive to light  Cardiovascular:      Rate and Rhythm: Normal rate and regular rhythm  Heart sounds: Normal heart sounds  No murmur heard  Pulmonary:      Effort: Pulmonary effort is normal  No respiratory distress  Breath sounds: Normal breath sounds  No wheezing  Abdominal:      General: Abdomen is flat  Bowel sounds are normal  There is no distension  Tenderness: There is no abdominal tenderness  Musculoskeletal:         General: Normal range of motion  Cervical back: Normal range of motion and neck supple  Skin:     General: Skin is warm and dry  Findings: No erythema or rash     Neurological:      Mental Status: She is alert and oriented to person, place, and time  Psychiatric:         Mood and Affect: Mood normal          Behavior: Behavior normal          Thought Content:  Thought content normal          Judgment: Judgment normal           KEM Rivas

## 2023-03-01 ENCOUNTER — TELEPHONE (OUTPATIENT)
Dept: FAMILY MEDICINE CLINIC | Facility: CLINIC | Age: 63
End: 2023-03-01

## 2023-03-01 NOTE — TELEPHONE ENCOUNTER
Patient left voice mail requesting a call back around 230-3pm  She states she was in to see PCP the other and she forgot to discuss something with her about her health  She can be reached at [phone]

## 2023-03-01 NOTE — TELEPHONE ENCOUNTER
Patient called stating she is still having pain in her abdomen below the ribs but above the hip  Its a sharp dull pain  Comes at all different times per day  Would like to know if she should have imaging

## 2023-03-02 ENCOUNTER — TELEPHONE (OUTPATIENT)
Dept: FAMILY MEDICINE CLINIC | Facility: CLINIC | Age: 63
End: 2023-03-02

## 2023-03-02 NOTE — TELEPHONE ENCOUNTER
Is it possible to get some more information on this pain?  Specific location, factors that make it better or worse, has she discussed this with her Gastroenterologist or Gynecologist?

## 2023-03-03 NOTE — TELEPHONE ENCOUNTER
I spoke with patient  Her pain is dull pain in between her rib cage and right hip  Happens throughout the day some days or not at all  Not sure if there is a trigger as it is random  Pain level 6/7 when she is having these pains  Has not discussed with any other providers  She thinks it may be her gallbladder or diverticulitis  Yesterday it happened throughout the day

## 2023-03-03 NOTE — TELEPHONE ENCOUNTER
She had pelvic and complete abdomen ultrasounds done July of 2021, is the pain different since then? If it is, you can reorder ultrasound of abdomen for her  Thanks!

## 2023-03-10 NOTE — TELEPHONE ENCOUNTER
Patient called the office back, stated the pain is the same as prior  It is a sporadic pain that comes and goes  She can go a months without having it, and then have it many times in a month  Patient stated she is going to get her blood work tomorrow morning  Patient thinks it may be gas pain but is unsure, patient also unsure if it is her gallbladder causing the pain

## 2023-03-11 ENCOUNTER — APPOINTMENT (OUTPATIENT)
Dept: LAB | Facility: CLINIC | Age: 63
End: 2023-03-11

## 2023-03-11 DIAGNOSIS — R53.83 FATIGUE, UNSPECIFIED TYPE: ICD-10-CM

## 2023-03-11 DIAGNOSIS — E03.9 HYPOTHYROIDISM, UNSPECIFIED TYPE: ICD-10-CM

## 2023-03-11 DIAGNOSIS — E03.9 ACQUIRED HYPOTHYROIDISM: ICD-10-CM

## 2023-03-11 DIAGNOSIS — E53.8 VITAMIN B12 DEFICIENCY: ICD-10-CM

## 2023-03-11 LAB
ALBUMIN SERPL BCP-MCNC: 3.7 G/DL (ref 3.5–5)
ALP SERPL-CCNC: 76 U/L (ref 46–116)
ALT SERPL W P-5'-P-CCNC: 26 U/L (ref 12–78)
ANION GAP SERPL CALCULATED.3IONS-SCNC: 0 MMOL/L (ref 4–13)
AST SERPL W P-5'-P-CCNC: 16 U/L (ref 5–45)
BASOPHILS # BLD AUTO: 0.03 THOUSANDS/ÂΜL (ref 0–0.1)
BASOPHILS NFR BLD AUTO: 1 % (ref 0–1)
BILIRUB SERPL-MCNC: 0.32 MG/DL (ref 0.2–1)
BUN SERPL-MCNC: 16 MG/DL (ref 5–25)
CALCIUM SERPL-MCNC: 8.9 MG/DL (ref 8.3–10.1)
CHLORIDE SERPL-SCNC: 108 MMOL/L (ref 96–108)
CHOLEST SERPL-MCNC: 229 MG/DL
CO2 SERPL-SCNC: 28 MMOL/L (ref 21–32)
CREAT SERPL-MCNC: 0.7 MG/DL (ref 0.6–1.3)
EOSINOPHIL # BLD AUTO: 0.14 THOUSAND/ÂΜL (ref 0–0.61)
EOSINOPHIL NFR BLD AUTO: 3 % (ref 0–6)
ERYTHROCYTE [DISTWIDTH] IN BLOOD BY AUTOMATED COUNT: 13.4 % (ref 11.6–15.1)
GFR SERPL CREATININE-BSD FRML MDRD: 91 ML/MIN/1.73SQ M
GLUCOSE P FAST SERPL-MCNC: 88 MG/DL (ref 65–99)
HCT VFR BLD AUTO: 42.5 % (ref 34.8–46.1)
HDLC SERPL-MCNC: 60 MG/DL
HGB BLD-MCNC: 13.4 G/DL (ref 11.5–15.4)
IMM GRANULOCYTES # BLD AUTO: 0.02 THOUSAND/UL (ref 0–0.2)
IMM GRANULOCYTES NFR BLD AUTO: 0 % (ref 0–2)
LDLC SERPL CALC-MCNC: 146 MG/DL (ref 0–100)
LYMPHOCYTES # BLD AUTO: 1.64 THOUSANDS/ÂΜL (ref 0.6–4.47)
LYMPHOCYTES NFR BLD AUTO: 35 % (ref 14–44)
MCH RBC QN AUTO: 29 PG (ref 26.8–34.3)
MCHC RBC AUTO-ENTMCNC: 31.5 G/DL (ref 31.4–37.4)
MCV RBC AUTO: 92 FL (ref 82–98)
MONOCYTES # BLD AUTO: 0.45 THOUSAND/ÂΜL (ref 0.17–1.22)
MONOCYTES NFR BLD AUTO: 10 % (ref 4–12)
NEUTROPHILS # BLD AUTO: 2.45 THOUSANDS/ÂΜL (ref 1.85–7.62)
NEUTS SEG NFR BLD AUTO: 51 % (ref 43–75)
NONHDLC SERPL-MCNC: 169 MG/DL
NRBC BLD AUTO-RTO: 0 /100 WBCS
PLATELET # BLD AUTO: 242 THOUSANDS/UL (ref 149–390)
PMV BLD AUTO: 9 FL (ref 8.9–12.7)
POTASSIUM SERPL-SCNC: 4.1 MMOL/L (ref 3.5–5.3)
PROT SERPL-MCNC: 6.5 G/DL (ref 6.4–8.4)
RBC # BLD AUTO: 4.62 MILLION/UL (ref 3.81–5.12)
SODIUM SERPL-SCNC: 136 MMOL/L (ref 135–147)
TRIGL SERPL-MCNC: 115 MG/DL
TSH SERPL DL<=0.05 MIU/L-ACNC: 1.86 UIU/ML (ref 0.45–4.5)
VIT B12 SERPL-MCNC: 408 PG/ML (ref 100–900)
WBC # BLD AUTO: 4.73 THOUSAND/UL (ref 4.31–10.16)

## 2023-03-15 ENCOUNTER — TELEPHONE (OUTPATIENT)
Dept: FAMILY MEDICINE CLINIC | Facility: CLINIC | Age: 63
End: 2023-03-15

## 2023-03-15 NOTE — TELEPHONE ENCOUNTER
Received a phone call from Dr Inge Chester office requesting we fax pt's EKG to them at [phone]      Printed and given to MR to fax

## 2023-08-28 ENCOUNTER — OFFICE VISIT (OUTPATIENT)
Dept: FAMILY MEDICINE CLINIC | Facility: CLINIC | Age: 63
End: 2023-08-28
Payer: COMMERCIAL

## 2023-08-28 VITALS
BODY MASS INDEX: 28.77 KG/M2 | SYSTOLIC BLOOD PRESSURE: 118 MMHG | HEIGHT: 63 IN | WEIGHT: 162.4 LBS | TEMPERATURE: 98.2 F | DIASTOLIC BLOOD PRESSURE: 78 MMHG | HEART RATE: 86 BPM | OXYGEN SATURATION: 98 %

## 2023-08-28 DIAGNOSIS — E03.9 HYPOTHYROIDISM, UNSPECIFIED TYPE: ICD-10-CM

## 2023-08-28 DIAGNOSIS — Z13.220 SCREENING CHOLESTEROL LEVEL: ICD-10-CM

## 2023-08-28 DIAGNOSIS — E03.9 ACQUIRED HYPOTHYROIDISM: Primary | ICD-10-CM

## 2023-08-28 DIAGNOSIS — I10 HYPERTENSION, UNSPECIFIED TYPE: ICD-10-CM

## 2023-08-28 PROCEDURE — 99214 OFFICE O/P EST MOD 30 MIN: CPT | Performed by: NURSE PRACTITIONER

## 2023-08-28 RX ORDER — LEVOTHYROXINE SODIUM 0.05 MG/1
50 TABLET ORAL DAILY
Qty: 90 TABLET | Refills: 3 | Status: SHIPPED | OUTPATIENT
Start: 2023-08-28

## 2023-08-28 RX ORDER — LISINOPRIL 10 MG/1
10 TABLET ORAL DAILY
Qty: 90 TABLET | Refills: 3 | Status: SHIPPED | OUTPATIENT
Start: 2023-08-28

## 2023-08-28 NOTE — PATIENT INSTRUCTIONS
Continue same medications  Get bloodwork done before next visit  6 month medcheck or call sooner for problems/concerns

## 2023-08-28 NOTE — PROGRESS NOTES
[facility]        NAME: Rick Blankneship is a 70 y.o. female  : 1960    MRN: [id number]  DATE: 2023  TIME: 2:45 PM    Assessment and Plan   Acquired hypothyroidism [E03.9]  1. Acquired hypothyroidism  TSH, 3rd generation with Free T4 reflex      2. Hypertension, unspecified type  Comprehensive metabolic panel    lisinopril (ZESTRIL) 10 mg tablet      3. Screening cholesterol level  Lipid panel      4. Hypothyroidism, unspecified type  levothyroxine (Euthyrox) 50 mcg tablet            Patient Instructions     Patient Instructions   Continue same medications  Get bloodwork done before next visit  6 month medcheck or call sooner for problems/concerns          Chief Complaint     Chief Complaint   Patient presents with   • Follow-up         History of Present Illness       Here for 6 month medcheck-   Is taking her medications as directed  Is willing to get bloodwork done when convenient  Concerns about her anxiety- all questions answered      Review of Systems   Review of Systems   Constitutional: Negative for activity change, diaphoresis, fatigue and fever. HENT: Negative for congestion, facial swelling, hearing loss, rhinorrhea, sinus pressure, sinus pain, sneezing, sore throat and voice change. Eyes: Negative for discharge and visual disturbance. Respiratory: Negative for cough, choking, chest tightness, shortness of breath, wheezing and stridor. Cardiovascular: Negative for chest pain, palpitations and leg swelling. Gastrointestinal: Negative for abdominal distention, abdominal pain, constipation, diarrhea, nausea and vomiting. Endocrine: Negative for polydipsia, polyphagia and polyuria. Genitourinary: Negative for difficulty urinating, dysuria, frequency and urgency. Musculoskeletal: Positive for arthralgias (left wrist- now improved) and myalgias. Skin: Negative for color change, rash and wound.    Neurological: Negative for dizziness, syncope, speech difficulty, weakness, light-headedness and headaches. Hematological: Negative for adenopathy. Does not bruise/bleed easily. Psychiatric/Behavioral: Positive for sleep disturbance. Negative for agitation, behavioral problems, confusion, hallucinations and suicidal ideas. The patient is nervous/anxious. Current Medications       Current Outpatient Medications:   •  cyclobenzaprine (FLEXERIL) 10 mg tablet, Take 1 tablet (10 mg total) by mouth 3 (three) times a day as needed for muscle spasms, Disp: 30 tablet, Rfl: 2  •  levothyroxine (Euthyrox) 50 mcg tablet, Take 1 tablet (50 mcg total) by mouth daily, Disp: 90 tablet, Rfl: 3  •  lisinopril (ZESTRIL) 10 mg tablet, Take 1 tablet (10 mg total) by mouth daily, Disp: 90 tablet, Rfl: 3  •  azelastine (ASTELIN) 0.1 % nasal spray, 1 spray into each nostril 2 (two) times a day Use in each nostril as directed (Patient not taking: Reported on 2/27/2023), Disp: 30 mL, Rfl: 0    Current Allergies     Allergies as of 08/28/2023   • (No Known Allergies)            The following portions of the patient's history were reviewed and updated as appropriate: allergies, current medications, past family history, past medical history, past social history, past surgical history and problem list.     Past Medical History:   Diagnosis Date   • Genital herpes        Past Surgical History:   Procedure Laterality Date   • APPENDECTOMY     • COLONOSCOPY     • CYSTOSCOPY     • TONSILLECTOMY         Family History   Problem Relation Age of Onset   • Heart disease Mother    • Diabetes Mother    • Heart disease Father    • No Known Problems Sister    • No Known Problems Maternal Grandmother    • No Known Problems Maternal Grandfather    • No Known Problems Paternal Grandmother    • No Known Problems Paternal Grandfather    • No Known Problems Maternal Aunt    • No Known Problems Maternal Aunt    • Breast cancer Other          Medications have been verified.         Objective   /78 Pulse 86   Temp 98.2 °F (36.8 °C)   Ht 5' 3" (1.6 m)   Wt 73.7 kg (162 lb 6.4 oz)   SpO2 98%   BMI 28.77 kg/m²        Physical Exam     Physical Exam  Vitals and nursing note reviewed. Constitutional:       General: She is not in acute distress. Appearance: Normal appearance. She is well-developed. She is not diaphoretic. HENT:      Head: Normocephalic and atraumatic. Right Ear: Tympanic membrane, ear canal and external ear normal.      Left Ear: Tympanic membrane, ear canal and external ear normal.      Nose: Nose normal.      Right Sinus: No maxillary sinus tenderness or frontal sinus tenderness. Left Sinus: No maxillary sinus tenderness or frontal sinus tenderness. Mouth/Throat:      Mouth: Mucous membranes are moist.      Pharynx: Oropharynx is clear. Uvula midline. No oropharyngeal exudate. Eyes:      General:         Right eye: No discharge. Left eye: No discharge. Conjunctiva/sclera: Conjunctivae normal.      Pupils: Pupils are equal, round, and reactive to light. Neck:      Thyroid: No thyromegaly. Trachea: No tracheal deviation. Cardiovascular:      Rate and Rhythm: Normal rate and regular rhythm. Heart sounds: Normal heart sounds. No murmur heard. No friction rub. No gallop. Pulmonary:      Effort: Pulmonary effort is normal. No respiratory distress. Breath sounds: Normal breath sounds. No wheezing or rales. Abdominal:      General: Bowel sounds are normal. There is no distension. Palpations: Abdomen is soft. There is no mass. Tenderness: There is no abdominal tenderness. There is no guarding or rebound. Musculoskeletal:         General: No tenderness or deformity. Normal range of motion. Cervical back: Normal range of motion and neck supple. Right lower leg: No edema. Left lower leg: No edema. Lymphadenopathy:      Cervical: No cervical adenopathy. Skin:     General: Skin is warm and dry.       Findings: No erythema or rash. Neurological:      Mental Status: She is alert and oriented to person, place, and time. Cranial Nerves: No cranial nerve deficit. Coordination: Coordination normal.   Psychiatric:         Mood and Affect: Mood normal.         Speech: Speech normal.         Behavior: Behavior normal.         Thought Content:  Thought content normal.         Judgment: Judgment normal.

## 2023-10-10 ENCOUNTER — TELEPHONE (OUTPATIENT)
Dept: FAMILY MEDICINE CLINIC | Facility: CLINIC | Age: 63
End: 2023-10-10

## 2023-10-10 DIAGNOSIS — M79.642 LEFT HAND PAIN: Primary | ICD-10-CM

## 2023-10-10 NOTE — TELEPHONE ENCOUNTER
Patient stopped in the office to ask PCP for referral for orthopedic for possible carpal tunnel or Ganglion Cyst left hand please call patient at [phone] when finished

## 2023-10-10 NOTE — TELEPHONE ENCOUNTER
Please give referral to [facility]- will be in as hand surgeon

## 2024-03-02 ENCOUNTER — APPOINTMENT (OUTPATIENT)
Dept: LAB | Facility: CLINIC | Age: 64
End: 2024-03-02
Payer: COMMERCIAL

## 2024-03-02 DIAGNOSIS — E03.9 ACQUIRED HYPOTHYROIDISM: ICD-10-CM

## 2024-03-02 DIAGNOSIS — I10 HYPERTENSION, UNSPECIFIED TYPE: ICD-10-CM

## 2024-03-02 DIAGNOSIS — Z13.220 SCREENING CHOLESTEROL LEVEL: ICD-10-CM

## 2024-03-02 LAB
ALBUMIN SERPL BCP-MCNC: 4 G/DL (ref 3.5–5)
ALP SERPL-CCNC: 64 U/L (ref 34–104)
ALT SERPL W P-5'-P-CCNC: 12 U/L (ref 7–52)
ANION GAP SERPL CALCULATED.3IONS-SCNC: 6 MMOL/L
AST SERPL W P-5'-P-CCNC: 12 U/L (ref 13–39)
BILIRUB SERPL-MCNC: 0.39 MG/DL (ref 0.2–1)
BUN SERPL-MCNC: 15 MG/DL (ref 5–25)
CALCIUM SERPL-MCNC: 9.3 MG/DL (ref 8.4–10.2)
CHLORIDE SERPL-SCNC: 103 MMOL/L (ref 96–108)
CHOLEST SERPL-MCNC: 206 MG/DL
CO2 SERPL-SCNC: 30 MMOL/L (ref 21–32)
CREAT SERPL-MCNC: 0.68 MG/DL (ref 0.6–1.3)
GFR SERPL CREATININE-BSD FRML MDRD: 91 ML/MIN/1.73SQ M
GLUCOSE P FAST SERPL-MCNC: 81 MG/DL (ref 65–99)
HDLC SERPL-MCNC: 59 MG/DL
LDLC SERPL CALC-MCNC: 130 MG/DL (ref 0–100)
NONHDLC SERPL-MCNC: 147 MG/DL
POTASSIUM SERPL-SCNC: 4.2 MMOL/L (ref 3.5–5.3)
PROT SERPL-MCNC: 6.3 G/DL (ref 6.4–8.4)
SODIUM SERPL-SCNC: 139 MMOL/L (ref 135–147)
TRIGL SERPL-MCNC: 86 MG/DL
TSH SERPL DL<=0.05 MIU/L-ACNC: 1.42 UIU/ML (ref 0.45–4.5)

## 2024-03-02 PROCEDURE — 80053 COMPREHEN METABOLIC PANEL: CPT

## 2024-03-02 PROCEDURE — 84443 ASSAY THYROID STIM HORMONE: CPT

## 2024-03-02 PROCEDURE — 36415 COLL VENOUS BLD VENIPUNCTURE: CPT

## 2024-03-02 PROCEDURE — 80061 LIPID PANEL: CPT

## 2024-03-04 ENCOUNTER — OFFICE VISIT (OUTPATIENT)
Dept: FAMILY MEDICINE CLINIC | Facility: CLINIC | Age: 64
End: 2024-03-04
Payer: COMMERCIAL

## 2024-03-04 VITALS
HEIGHT: 63 IN | OXYGEN SATURATION: 97 % | RESPIRATION RATE: 18 BRPM | SYSTOLIC BLOOD PRESSURE: 112 MMHG | DIASTOLIC BLOOD PRESSURE: 86 MMHG | TEMPERATURE: 97.6 F | WEIGHT: 162 LBS | HEART RATE: 96 BPM | BODY MASS INDEX: 28.7 KG/M2

## 2024-03-04 DIAGNOSIS — Z00.00 MEDICARE ANNUAL WELLNESS VISIT, SUBSEQUENT: Primary | ICD-10-CM

## 2024-03-04 DIAGNOSIS — E03.9 ACQUIRED HYPOTHYROIDISM: ICD-10-CM

## 2024-03-04 DIAGNOSIS — Z13.220 SCREENING CHOLESTEROL LEVEL: ICD-10-CM

## 2024-03-04 DIAGNOSIS — I10 PRIMARY HYPERTENSION: ICD-10-CM

## 2024-03-04 PROCEDURE — G0439 PPPS, SUBSEQ VISIT: HCPCS | Performed by: NURSE PRACTITIONER

## 2024-03-04 PROCEDURE — 99213 OFFICE O/P EST LOW 20 MIN: CPT | Performed by: NURSE PRACTITIONER

## 2024-03-04 NOTE — PROGRESS NOTES
Assessment and Plan:     Problem List Items Addressed This Visit          Endocrine    Hypothyroidism    Relevant Orders    TSH, 3rd generation with Free T4 reflex       Cardiovascular and Mediastinum    Hypertension     Other Visit Diagnoses       Medicare annual wellness visit, subsequent    -  Primary    Relevant Orders    Comprehensive metabolic panel    CBC and differential    Screening cholesterol level        Relevant Orders    Lipid panel            Depression Screening and Follow-up Plan: Patient was screened for depression during today's encounter. They screened negative with a PHQ-9 score of 0.      Preventive health issues were discussed with patient, and age appropriate screening tests were ordered as noted in patient's After Visit Summary.  Personalized health advice and appropriate referrals for health education or preventive services given if needed, as noted in patient's After Visit Summary.     History of Present Illness:     Patient presents for a Medicare Wellness Visit    Here for 6 month [employer] and medicare wellness.   Is struggling with her aging dog- did start virtual therapy. Increased fatigue, takes gummy Nyquil Zzz. Is requesting to start something for sleep but realizes this is not realistic when he dog gets her up during the night.     Taking medications as directed     Lab work reviewed. All questions answered        Patient Care Team:  KEM Blood as PCP - General (Family Medicine)  Ajay Grant DO as PCP - PCP-[facility] ([facility])  Geeta Denis MD as PCP - PCP-[facility] ([facility])  Naina Lr DO (Obstetrics and Gynecology)     Review of Systems:     Review of Systems   Constitutional:  Negative for activity change, diaphoresis, fatigue and fever.   HENT:  Negative for congestion, facial swelling, hearing loss, rhinorrhea, sinus pressure, sinus pain, sneezing, sore throat and voice change.    Eyes:  Negative for discharge and visual disturbance.    Respiratory:  Negative for cough, choking, chest tightness, shortness of breath, wheezing and stridor.    Cardiovascular:  Negative for chest pain, palpitations and leg swelling.   Gastrointestinal:  Negative for abdominal distention, abdominal pain, constipation, diarrhea, nausea and vomiting.   Endocrine: Negative for polydipsia, polyphagia and polyuria.   Genitourinary:  Negative for difficulty urinating, dysuria, frequency and urgency.   Musculoskeletal:  Positive for arthralgias and myalgias. Negative for back pain, gait problem, joint swelling, neck pain and neck stiffness.   Skin:  Negative for color change, rash and wound.   Neurological:  Negative for dizziness, syncope, speech difficulty, weakness, light-headedness and headaches.   Hematological:  Negative for adenopathy. Does not bruise/bleed easily.   Psychiatric/Behavioral:  Positive for sleep disturbance. Negative for agitation, behavioral problems, confusion, hallucinations and suicidal ideas. The patient is not nervous/anxious.    All other systems reviewed and are negative.       Problem List:     Patient Active Problem List   Diagnosis    Hypertension    Hypothyroidism    Breast cancer screening by mammogram    Genital herpes    Stress reaction      Past Medical and Surgical History:     Past Medical History:   Diagnosis Date    Genital herpes      Past Surgical History:   Procedure Laterality Date    APPENDECTOMY      COLONOSCOPY      CYSTOSCOPY      TONSILLECTOMY        Family History:     Family History   Problem Relation Age of Onset    Heart disease Mother     Diabetes Mother     Heart disease Father     No Known Problems Sister     No Known Problems Maternal Grandmother     No Known Problems Maternal Grandfather     No Known Problems Paternal Grandmother     No Known Problems Paternal Grandfather     No Known Problems Maternal Aunt     No Known Problems Maternal Aunt     Breast cancer Other       Social History:     Social History      Socioeconomic History    Marital status: Single     Spouse name: None    Number of children: None    Years of education: None    Highest education level: None   Occupational History    Occupation:    Tobacco Use    Smoking status: Former     Current packs/day: 0.00     Types: Cigarettes     Quit date: 1988     Years since quittin.6    Smokeless tobacco: Never   Vaping Use    Vaping status: Never Used   Substance and Sexual Activity    Alcohol use: No    Drug use: No    Sexual activity: Not Currently   Other Topics Concern    None   Social History Narrative    None     Social Determinants of Health     Financial Resource Strain: Low Risk  (3/4/2024)    Overall Financial Resource Strain (CARDIA)     Difficulty of Paying Living Expenses: Not hard at all   Food Insecurity: Not on file   Transportation Needs: No Transportation Needs (3/4/2024)    PRAPARE - Transportation     Lack of Transportation (Medical): No     Lack of Transportation (Non-Medical): No   Physical Activity: Not on file   Stress: Not on file   Social Connections: Not on file   Intimate Partner Violence: Not on file   Housing Stability: Not on file      Medications and Allergies:     Current Outpatient Medications   Medication Sig Dispense Refill    cyclobenzaprine (FLEXERIL) 10 mg tablet Take 1 tablet (10 mg total) by mouth 3 (three) times a day as needed for muscle spasms 30 tablet 2    levothyroxine (Euthyrox) 50 mcg tablet Take 1 tablet (50 mcg total) by mouth daily 90 tablet 3    lisinopril (ZESTRIL) 10 mg tablet Take 1 tablet (10 mg total) by mouth daily 90 tablet 3    azelastine (ASTELIN) 0.1 % nasal spray 1 spray into each nostril 2 (two) times a day Use in each nostril as directed (Patient not taking: Reported on 2023) 30 mL 0     No current facility-administered medications for this visit.     No Known Allergies   Immunizations:     Immunization History   Administered Date(s) Administered    COVID-19 J&J (Michelle)  vaccine 0.5 mL 03/27/2021    COVID-19 MODERNA VACC 0.5 ML IM 11/07/2021, 06/06/2022    COVID-19 Moderna Vac BIVALENT 12 Yr+ IM 0.5 ML 10/06/2022    COVID-19 Moderna mRNA Vaccine 12 Yr+ 50 mcg/0.5 mL (Spikevax) 10/16/2023    INFLUENZA 12/19/2014, 12/31/2016, 11/01/2018, 12/31/2021, 10/06/2022, 10/16/2023    Influenza, injectable, quadrivalent, preservative free 0.5 mL 11/01/2018    Influenza, recombinant, quadrivalent,injectable, preservative free 10/01/2020    Pneumococcal Conjugate Vaccine 20-valent (Pcv20), Polysace 02/27/2023    Tdap 02/08/2017, 05/27/2022    Zoster Vaccine Recombinant 05/19/2020, 07/14/2020      Health Maintenance:         Topic Date Due    Colorectal Cancer Screening  04/09/2024    Breast Cancer Screening: Mammogram  04/10/2025    Hepatitis C Screening  Completed         Topic Date Due    COVID-19 Vaccine (6 - 2023-24 season) 12/11/2023      Medicare Screening Tests and Risk Assessments:     Angelina is here for her Subsequent Wellness visit.     Health Risk Assessment:   Patient rates overall health as very good. Patient feels that their physical health rating is same. Patient is satisfied with their life. Eyesight was rated as same. Hearing was rated as same. Patient feels that their emotional and mental health rating is same. Patients states they are never, rarely angry. Patient states they are sometimes unusually tired/fatigued. Pain experienced in the last 7 days has been some. Patient's pain rating has been 3/10. Patient states that she has experienced no weight loss or gain in last 6 months.     Depression Screening:   PHQ-9 Score: 0      Fall Risk Screening:   In the past year, patient has experienced: no history of falling in past year      Urinary Incontinence Screening:   Patient has not leaked urine accidently in the last six months.     Home Safety:  Patient has trouble with stairs inside or outside of their home. Patient has working smoke alarms and has working carbon monoxide  "detector. Home safety hazards include: none.     Nutrition:   Current diet is Regular.     Medications:   Patient is not currently taking any over-the-counter supplements. Patient is able to manage medications.     Activities of Daily Living (ADLs)/Instrumental Activities of Daily Living (IADLs):   Walk and transfer into and out of bed and chair?: Yes  Dress and groom yourself?: Yes    Bathe or shower yourself?: Yes    Feed yourself? Yes  Do your laundry/housekeeping?: Yes  Manage your money, pay your bills and track your expenses?: Yes  Make your own meals?: Yes    Do your own shopping?: Yes    Previous Hospitalizations:   Any hospitalizations or ED visits within the last 12 months?: No      Advance Care Planning:   Living will: Yes    Durable POA for healthcare: Yes    Advanced directive: Yes      PREVENTIVE SCREENINGS      Cardiovascular Screening:    General: Screening Current    Due for: Lipid Panel      Diabetes Screening:     General: Screening Current    Due for: Blood Glucose      Colorectal Cancer Screening:     General: Screening Current      Breast Cancer Screening:     General: Screening Current      Cervical Cancer Screening:    General: Screening Not Indicated      Lung Cancer Screening:     General: Screening Not Indicated      Hepatitis C Screening:    General: Screening Current    Screening, Brief Intervention, and Referral to Treatment (SBIRT)    Screening  Typical number of drinks in a day: 0  Typical number of drinks in a week: 0  Interpretation: Low risk drinking behavior.    Single Item Drug Screening:  How often have you used an illegal drug (including marijuana) or a prescription medication for non-medical reasons in the past year? never    Single Item Drug Screen Score: 0  Interpretation: Negative screen for possible drug use disorder    No results found.     Physical Exam:     /86   Pulse 96   Temp 97.6 °F (36.4 °C)   Resp 18   Ht 5' 3\" (1.6 m)   Wt 73.5 kg (162 lb)   SpO2 97%  "  BMI 28.70 kg/m²     Physical Exam  Vitals and nursing note reviewed.   Constitutional:       General: She is not in acute distress.     Appearance: She is well-developed. She is not diaphoretic.   Cardiovascular:      Rate and Rhythm: Normal rate and regular rhythm.      Heart sounds: Normal heart sounds. No murmur heard.     No friction rub. No gallop.   Pulmonary:      Effort: Pulmonary effort is normal. No respiratory distress.      Breath sounds: Normal breath sounds. No wheezing.   Musculoskeletal:         General: Normal range of motion.      Cervical back: Normal range of motion.   Neurological:      Mental Status: She is alert and oriented to person, place, and time.   Psychiatric:         Mood and Affect: Mood normal.         Behavior: Behavior normal.         Thought Content: Thought content normal.         Judgment: Judgment normal.          KEM Blood

## 2024-03-04 NOTE — PATIENT INSTRUCTIONS
Continue medications as directed   Lab work prior to next visit in 6 months   Return with issues/concerns     Medicare Preventive Visit Patient Instructions  Thank you for completing your Welcome to Medicare Visit or Medicare Annual Wellness Visit today. Your next wellness visit will be due in one year (3/5/2025).  The screening/preventive services that you may require over the next 5-10 years are detailed below. Some tests may not apply to you based off risk factors and/or age. Screening tests ordered at today's visit but not completed yet may show as past due. Also, please note that scanned in results may not display below.  Preventive Screenings:  Service Recommendations Previous Testing/Comments   Colorectal Cancer Screening  * Colonoscopy    * Fecal Occult Blood Test (FOBT)/Fecal Immunochemical Test (FIT)  * Fecal DNA/Cologuard Test  * Flexible Sigmoidoscopy Age: 45-75 years old   Colonoscopy: every 10 years (may be performed more frequently if at higher risk)  OR  FOBT/FIT: every 1 year  OR  Cologuard: every 3 years  OR  Sigmoidoscopy: every 5 years  Screening may be recommended earlier than age 45 if at higher risk for colorectal cancer. Also, an individualized decision between you and your healthcare provider will decide whether screening between the ages of 76-85 would be appropriate. Colonoscopy: 04/09/2019  FOBT/FIT: Not on file  Cologuard: Not on file  Sigmoidoscopy: Not on file          Breast Cancer Screening Age: 40+ years old  Frequency: every 1-2 years  Not required if history of left and right mastectomy Mammogram: 04/10/2023        Cervical Cancer Screening Between the ages of 21-29, pap smear recommended once every 3 years.   Between the ages of 30-65, can perform pap smear with HPV co-testing every 5 years.   Recommendations may differ for women with a history of total hysterectomy, cervical cancer, or abnormal pap smears in past. Pap Smear: 10/27/2022        Hepatitis C Screening Once for  adults born between 1945 and 1965  More frequently in patients at high risk for Hepatitis C Hep C Antibody: 02/18/2019        Diabetes Screening 1-2 times per year if you're at risk for diabetes or have pre-diabetes Fasting glucose: 81 mg/dL (3/2/2024)  A1C: No results in last 5 years (No results in last 5 years)      Cholesterol Screening Once every 5 years if you don't have a lipid disorder. May order more often based on risk factors. Lipid panel: 03/02/2024          Other Preventive Screenings Covered by Medicare:  Abdominal Aortic Aneurysm (AAA) Screening: covered once if your at risk. You're considered to be at risk if you have a family history of AAA.  Lung Cancer Screening: covers low dose CT scan once per year if you meet all of the following conditions: (1) Age 55-77; (2) No signs or symptoms of lung cancer; (3) Current smoker or have quit smoking within the last 15 years; (4) You have a tobacco smoking history of at least 20 pack years (packs per day multiplied by number of years you smoked); (5) You get a written order from a healthcare provider.  Glaucoma Screening: covered annually if you're considered high risk: (1) You have diabetes OR (2) Family history of glaucoma OR (3)  aged 50 and older OR (4)  American aged 65 and older  Osteoporosis Screening: covered every 2 years if you meet one of the following conditions: (1) You're estrogen deficient and at risk for osteoporosis based off medical history and other findings; (2) Have a vertebral abnormality; (3) On glucocorticoid therapy for more than 3 months; (4) Have primary hyperparathyroidism; (5) On osteoporosis medications and need to assess response to drug therapy.   Last bone density test (DXA Scan): 04/10/2023.  HIV Screening: covered annually if you're between the age of 15-65. Also covered annually if you are younger than 15 and older than 65 with risk factors for HIV infection. For pregnant patients, it is covered up to  3 times per pregnancy.    Immunizations:  Immunization Recommendations   Influenza Vaccine Annual influenza vaccination during flu season is recommended for all persons aged >= 6 months who do not have contraindications   Pneumococcal Vaccine   * Pneumococcal conjugate vaccine = PCV13 (Prevnar 13), PCV15 (Vaxneuvance), PCV20 (Prevnar 20)  * Pneumococcal polysaccharide vaccine = PPSV23 (Pneumovax) Adults 19-63 yo with certain risk factors or if 65+ yo  If never received any pneumonia vaccine: recommend Prevnar 20 (PCV20)  Give PCV20 if previously received 1 dose of PCV13 or PPSV23   Hepatitis B Vaccine 3 dose series if at intermediate or high risk (ex: diabetes, end stage renal disease, liver disease)   Respiratory syncytial virus (RSV) Vaccine - COVERED BY MEDICARE PART D  * RSVPreF3 (Arexvy) CDC recommends that adults 60 years of age and older may receive a single dose of RSV vaccine using shared clinical decision-making (SCDM)   Tetanus (Td) Vaccine - COST NOT COVERED BY MEDICARE PART B Following completion of primary series, a booster dose should be given every 10 years to maintain immunity against tetanus. Td may also be given as tetanus wound prophylaxis.   Tdap Vaccine - COST NOT COVERED BY MEDICARE PART B Recommended at least once for all adults. For pregnant patients, recommended with each pregnancy.   Shingles Vaccine (Shingrix) - COST NOT COVERED BY MEDICARE PART B  2 shot series recommended in those 19 years and older who have or will have weakened immune systems or those 50 years and older     Health Maintenance Due:      Topic Date Due    Colorectal Cancer Screening  04/09/2024    Breast Cancer Screening: Mammogram  04/10/2025    Hepatitis C Screening  Completed     Immunizations Due:      Topic Date Due    COVID-19 Vaccine (6 - 2023-24 season) 12/11/2023     Advance Directives   What are advance directives?  Advance directives are legal documents that state your wishes and plans for medical care. These  plans are made ahead of time in case you lose your ability to make decisions for yourself. Advance directives can apply to any medical decision, such as the treatments you want, and if you want to donate organs.   What are the types of advance directives?  There are many types of advance directives, and each state has rules about how to use them. You may choose a combination of any of the following:  Living will:  This is a written record of the treatment you want. You can also choose which treatments you do not want, which to limit, and which to stop at a certain time. This includes surgery, medicine, IV fluid, and tube feedings.   Durable power of  for healthcare (DPAHC):  This is a written record that states who you want to make healthcare choices for you when you are unable to make them for yourself. This person, called a proxy, is usually a family member or a friend. You may choose more than 1 proxy.  Do not resuscitate (DNR) order:  A DNR order is used in case your heart stops beating or you stop breathing. It is a request not to have certain forms of treatment, such as CPR. A DNR order may be included in other types of advance directives.  Medical directive:  This covers the care that you want if you are in a coma, near death, or unable to make decisions for yourself. You can list the treatments you want for each condition. Treatment may include pain medicine, surgery, blood transfusions, dialysis, IV or tube feedings, and a ventilator (breathing machine).  Values history:  This document has questions about your views, beliefs, and how you feel and think about life. This information can help others choose the care that you would choose.  Why are advance directives important?  An advance directive helps you control your care. Although spoken wishes may be used, it is better to have your wishes written down. Spoken wishes can be misunderstood, or not followed. Treatments may be given even if you do not  want them. An advance directive may make it easier for your family to make difficult choices about your care.   Weight Management   Why it is important to manage your weight:  Being overweight increases your risk of health conditions such as heart disease, high blood pressure, type 2 diabetes, and certain types of cancer. It can also increase your risk for osteoarthritis, sleep apnea, and other respiratory problems. Aim for a slow, steady weight loss. Even a small amount of weight loss can lower your risk of health problems.  How to lose weight safely:  A safe and healthy way to lose weight is to eat fewer calories and get regular exercise. You can lose up about 1 pound a week by decreasing the number of calories you eat by 500 calories each day.   Healthy meal plan for weight management:  A healthy meal plan includes a variety of foods, contains fewer calories, and helps you stay healthy. A healthy meal plan includes the following:  Eat whole-grain foods more often.  A healthy meal plan should contain fiber. Fiber is the part of grains, fruits, and vegetables that is not broken down by your body. Whole-grain foods are healthy and provide extra fiber in your diet. Some examples of whole-grain foods are whole-wheat breads and pastas, oatmeal, brown rice, and bulgur.  Eat a variety of vegetables every day.  Include dark, leafy greens such as spinach, kale, barry greens, and mustard greens. Eat yellow and orange vegetables such as carrots, sweet potatoes, and winter squash.   Eat a variety of fruits every day.  Choose fresh or canned fruit (canned in its own juice or light syrup) instead of juice. Fruit juice has very little or no fiber.  Eat low-fat dairy foods.  Drink fat-free (skim) milk or 1% milk. Eat fat-free yogurt and low-fat cottage cheese. Try low-fat cheeses such as mozzarella and other reduced-fat cheeses.  Choose meat and other protein foods that are low in fat.  Choose beans or other legumes such as  split peas or lentils. Choose fish, skinless poultry (chicken or turkey), or lean cuts of red meat (beef or pork). Before you cook meat or poultry, cut off any visible fat.   Use less fat and oil.  Try baking foods instead of frying them. Add less fat, such as margarine, sour cream, regular salad dressing and mayonnaise to foods. Eat fewer high-fat foods. Some examples of high-fat foods include french fries, doughnuts, ice cream, and cakes.  Eat fewer sweets.  Limit foods and drinks that are high in sugar. This includes candy, cookies, regular soda, and sweetened drinks.  Exercise:  Exercise at least 30 minutes per day on most days of the week. Some examples of exercise include walking, biking, dancing, and swimming. You can also fit in more physical activity by taking the stairs instead of the elevator or parking farther away from stores. Ask your healthcare provider about the best exercise plan for you.      © Copyright [employer] 2018 Information is for End User's use only and may not be sold, redistributed or otherwise used for commercial purposes. All illustrations and images included in CareNotes® are the copyrighted property of A.D.A[employer]M., Inc. or [employer]

## 2024-03-20 ENCOUNTER — TELEPHONE (OUTPATIENT)
Dept: FAMILY MEDICINE CLINIC | Facility: CLINIC | Age: 64
End: 2024-03-20

## 2024-03-20 DIAGNOSIS — M25.551 RIGHT HIP PAIN: Primary | ICD-10-CM

## 2024-03-20 NOTE — TELEPHONE ENCOUNTER
Pt called the office today. She is wondering if she can have an xray of her left hip for the pain she is having that runs down her leg.  She also has a cyst on her left wrist and is wondering if she can have a referral for general surgery. Please advise.

## 2024-03-26 ENCOUNTER — APPOINTMENT (OUTPATIENT)
Dept: RADIOLOGY | Facility: CLINIC | Age: 64
End: 2024-03-26
Payer: COMMERCIAL

## 2024-03-26 DIAGNOSIS — M25.551 RIGHT HIP PAIN: ICD-10-CM

## 2024-03-26 DIAGNOSIS — M25.552 LEFT HIP PAIN: ICD-10-CM

## 2024-03-26 DIAGNOSIS — M25.552 LEFT HIP PAIN: Primary | ICD-10-CM

## 2024-03-26 PROCEDURE — 73502 X-RAY EXAM HIP UNI 2-3 VIEWS: CPT

## 2024-04-01 ENCOUNTER — OFFICE VISIT (OUTPATIENT)
Dept: OBGYN CLINIC | Facility: CLINIC | Age: 64
End: 2024-04-01
Payer: COMMERCIAL

## 2024-04-01 VITALS
DIASTOLIC BLOOD PRESSURE: 83 MMHG | HEIGHT: 63 IN | SYSTOLIC BLOOD PRESSURE: 120 MMHG | HEART RATE: 92 BPM | WEIGHT: 160 LBS | BODY MASS INDEX: 28.35 KG/M2

## 2024-04-01 DIAGNOSIS — M16.11 PRIMARY OSTEOARTHRITIS OF ONE HIP, RIGHT: Primary | ICD-10-CM

## 2024-04-01 DIAGNOSIS — M25.551 RIGHT HIP PAIN: ICD-10-CM

## 2024-04-01 PROCEDURE — 99204 OFFICE O/P NEW MOD 45 MIN: CPT | Performed by: STUDENT IN AN ORGANIZED HEALTH CARE EDUCATION/TRAINING PROGRAM

## 2024-04-01 NOTE — PROGRESS NOTES
Ortho Sports Medicine New Patient Hip Visit    Assesment:   64 y.o. female with left hip pain ongoing for approximately 2 months without any known injuries. Exam and imaging consistent with mild left hip osteoarthritis.    Plan:  I reviewed the history, exam, and imaging with the patient in clinic today.  I discussed with the patient that their hip pain was likely due to mild osteoarthritis as noted on imaging and consistent with the exam in clinic today.  I discussed the natural history of osteoarthritis.  I discussed conservative treatment options including activity modification, weight loss, oral pain medications including Tylenol and anti-inflammatories, physical therapy, and injections.  We also discussed surgical intervention.  After a long discussion with the patient, the patient opted for physical therapy and NSAIDs on an asneeded basis.  I provided the patient with a prescription for physical therapy. The patient demonstrated understanding of our discussion and was in agreement with the plan.  All of their questions were answered.  I will see them back PRN.  In the meantime, the patient can reach out to the clinic with any further questions or concerns at any time    Conservative Treatment:   Ice to hip region for 20 minutes at least 1-2 times daily.  PT for ROM/strengthening to hip, back, legs and core. May go once and request a HEP.  OTC NSAIDS prn for pain.  Let pain guide activities.    Imaging:  All imaging from today was reviewed by myself and explained to the patient.     Injection:    No Injection planned at this time.    Surgery:  No surgery is recommended at this point, continue with conservative treatment plan as noted.    Follow up:  Return if symptoms worsen or fail to improve.        Chief Complaint   Patient presents with    Left Hip - Pain       History of Present Illness:  The patient is a 64 y.o. female seen in clinic for left hip pain. The pain started about 1-2 months ago without any  mechanism of injury. The pain is now located anteriorly and is not associated with limited ROM. The patient characterizes the intensity of pain as a 8 out of 10 at worst. Symptoms are aggravated by stairs and getting in/out of a car. The patient denies any low back pain, radiating pain down the lower extremity, numbness or tingling. The pain is intermittent, sharp and does radiate slightly down the anterior aspect of the proximal thigh (about 5 inches). The patient has tried Epson salts and is unsure if this helped. Symptoms have worsened with time. Patient has no history of prior injury, surgery. No right hip pain. No left sided knee pain.    Occupation:     The patient has the following co-morbidities: n/a      Hip Surgical History:  None    Past Medical, Social and Family History:  Past Medical History:   Diagnosis Date    Genital herpes      Past Surgical History:   Procedure Laterality Date    APPENDECTOMY      COLONOSCOPY      CYSTOSCOPY      TONSILLECTOMY       No Known Allergies  Current Outpatient Medications on File Prior to Visit   Medication Sig Dispense Refill    levothyroxine (Euthyrox) 50 mcg tablet Take 1 tablet (50 mcg total) by mouth daily 90 tablet 3    lisinopril (ZESTRIL) 10 mg tablet Take 1 tablet (10 mg total) by mouth daily 90 tablet 3    azelastine (ASTELIN) 0.1 % nasal spray 1 spray into each nostril 2 (two) times a day Use in each nostril as directed (Patient not taking: Reported on 2/27/2023) 30 mL 0    cyclobenzaprine (FLEXERIL) 10 mg tablet Take 1 tablet (10 mg total) by mouth 3 (three) times a day as needed for muscle spasms (Patient not taking: Reported on 4/1/2024) 30 tablet 2     No current facility-administered medications on file prior to visit.     Social History     Socioeconomic History    Marital status: Single     Spouse name: Not on file    Number of children: Not on file    Years of education: Not on file    Highest education level: Not on file  "  Occupational History    Occupation:    Tobacco Use    Smoking status: Former     Current packs/day: 0.00     Types: Cigarettes     Quit date: 1988     Years since quittin.6    Smokeless tobacco: Never   Vaping Use    Vaping status: Never Used   Substance and Sexual Activity    Alcohol use: No    Drug use: No    Sexual activity: Not Currently   Other Topics Concern    Not on file   Social History Narrative    Not on file     Social Determinants of Health     Financial Resource Strain: Low Risk  (3/4/2024)    Overall Financial Resource Strain (CARDIA)     Difficulty of Paying Living Expenses: Not hard at all   Food Insecurity: Not on file   Transportation Needs: No Transportation Needs (3/4/2024)    PRAPARE - Transportation     Lack of Transportation (Medical): No     Lack of Transportation (Non-Medical): No   Physical Activity: Not on file   Stress: Not on file   Social Connections: Not on file   Intimate Partner Violence: Not on file   Housing Stability: Not on file         I have reviewed the past medical, surgical, social and family history, medications and allergies as documented in the EMR.    Review of systems: ROS is negative other than that noted in the HPI.  Psychiatric/Behavioral: Negative for agitation.      Physical Exam:    Blood pressure 120/83, pulse 92, height 5' 3\" (1.6 m), weight 72.6 kg (160 lb).    General/Constitutional: NAD, well developed, well nourished  HENT: Normocephalic, atraumatic  CV: Intact distal pulses, regular rate  Resp: No respiratory distress or labored breathing  Neuro: Alert and Oriented x 3  Psych: Normal mood, normal affect, normal judgement, normal behavior  Skin: Warm, dry, no rashes, no erythema      Focused left Hip Exam:  No dislocation/deformity  ROM: Full without pain  Greater troch:non-tender   SI joint: non-tender  Jaja test: negative  Anai's test:  negative  Abduction: 5/5 without pain  Adduction: 5/5 without pain  AT/GS intact  Negative log " roll    Back:    No TTP over lumbar spinous processes, paraspinal musculature  Negative SLR     No calf tenderness to palpation bilaterally    LE NV Exam: +2 DP/PT pulses bilaterally  Sensation intact to light touch L2-S1 bilaterally, SPN/DPN/TA motor intact    Hip Imaging:    X-rays of the left hip were obtained on 3/26/24 and reviewed with the patient. Based on my independent evaluation, the imaging shows no acute osseous abnormalities but mild hip OA and degenerative changes of the lumbar spine.      Scribe Attestation      I,:   am acting as a scribe while in the presence of the attending physician.:       I,:   personally performed the services described in this documentation    as scribed in my presence.:

## 2024-04-17 ENCOUNTER — EVALUATION (OUTPATIENT)
Dept: PHYSICAL THERAPY | Facility: CLINIC | Age: 64
End: 2024-04-17
Payer: COMMERCIAL

## 2024-04-17 DIAGNOSIS — M16.11 PRIMARY OSTEOARTHRITIS OF ONE HIP, RIGHT: Primary | ICD-10-CM

## 2024-04-17 PROCEDURE — 97110 THERAPEUTIC EXERCISES: CPT | Performed by: PHYSICAL THERAPIST

## 2024-04-17 PROCEDURE — 97162 PT EVAL MOD COMPLEX 30 MIN: CPT | Performed by: PHYSICAL THERAPIST

## 2024-04-17 NOTE — PROGRESS NOTES
PT Evaluation     Today's date: 2024  Patient name: Angelina Yanes  : 1960  MRN: [id number]  Referring provider: Som Hearn MD  Dx:   Encounter Diagnosis     ICD-10-CM    1. Primary osteoarthritis of one hip, right  M16.11 Ambulatory Referral to Physical Therapy          Start Time: 1530  Stop Time: 1615  Total time in clinic (min): 45 minutes    Assessment  Assessment details: Angelina Yanes was seen for an initial PT evaluation today. Patient is a 64 y.o. female with diagnosis of L hip OA and past medical history significant for appendectomy, HTN, hypothyroidism. Moderate complexity evaluation  due to number of participation restrictions, functional outcome measure of 45% limitation, and evolving clinical presentation. Findings today show limited left hip strength, range of motion, balance, with mm tightness and pain impacting overall functional mobility including ability to walk, stand, squat. Skilled PT indicated to treat at this time to address above stated deficits and return patient to PLOF.     Impairments: abnormal gait, abnormal muscle firing, abnormal muscle tone, abnormal or restricted ROM, abnormal movement, activity intolerance, impaired balance, impaired physical strength, lacks appropriate home exercise program, pain with function and safety issue  Functional limitations: walking, stairs, squatting, lunging, lifting, kneeling  Goals  STG (6 weeks)  1. Patient will have reported 0/10 pain in left hip at rest.   2. Improve patient's left hip extension to 10 degrees for increased ability to take proper strides during ambulation.  3. Increase patient's left single leg balance to 10 seconds for increased stability on stairs.   LTG (12 weeks)  1. Patient's LE strength will be equal bilaterally for ability to ambulate and return to functional activities at PLOF.   2. Patient will be able to return to the gym with 0/10 pain in left hip.   3. Patient will be independent with home  exercise program for continued maintenance post PT discharge.       Plan  Plan details: Attempting HEP for 2 weeks. Will f/u via phone at that time.   Patient would benefit from: skilled physical therapy  Planned modality interventions: unattended electrical stimulation, thermotherapy: hydrocollator packs and cryotherapy  Planned therapy interventions: manual therapy, neuromuscular re-education, self care, home exercise program, gait training, therapeutic exercise and therapeutic activities  Duration in weeks: 12  Plan of Care beginning date: 2024  Plan of Care expiration date: 2024  Treatment plan discussed with: patient and PTA        Subjective Evaluation    History of Present Illness  Date of onset: 2024  Mechanism of injury: Angelina Yanes is a 64 y.o. female who presents to outpatient Physical Therapy today with complaints of L groin pain. States pain began a few months ago with no SAVAGE. Seen by ortho recently when pain was increasing. Dx with mild OA post x-ray. Was referred to PT and has no current f/u visit with ortho unless needed. Patient would like to have HEP to work on independently. C/o pain with walking, standing, stair and car transfers.  Pain in left groin, anterior hip to proximal leg.     Patient Goals  Patient goals for therapy: decreased pain  Patient goal: Home program, increased mobility  Pain  Current pain ratin  At best pain ratin  At worst pain ratin  Location: L hip  Quality: sharp and dull ache  Alleviating factors: resting.  Exacerbated by: standing, walking, stairs, transfers.  Progression: improved    Social Support  Steps to enter house: yes  5  Stairs in house: yes   Lives in: multiple-level home    Employment status: working ()    Diagnostic Tests  X-ray: abnormal        Objective     Neurological Testing     Sensation     Hip   Left Hip   Intact: light touch    Right Hip   Intact: light touch    Active Range of Motion   Left Hip    Flexion: 100 degrees   Extension: 0 degrees   Abduction: 25 degrees with pain  External rotation (90/90): 25 degrees   Internal rotation (90/90): 25 degrees     Right Hip   Flexion: 120 degrees   Extension: 15 degrees   Abduction: 40 degrees   External rotation (90/90): 25 degrees   Internal rotation (90/90): 40 degrees     Strength/Myotome Testing     Left Hip   Planes of Motion   Flexion: 4  Extension: 2+  Abduction: 5  External rotation: 4-  Internal rotation: 4+    Right Hip   Planes of Motion   Flexion: 4+  Extension: 4-  Abduction: 4  External rotation: 4  Internal rotation: 4    Left Knee   Flexion: 5  Extension: 5    Right Knee   Flexion: 5  Extension: 5    Left Ankle/Foot   Dorsiflexion: 5    Right Ankle/Foot   Dorsiflexion: 5    Tests     Left Hip   Positive MICHELET.   Negative scour.     Additional Tests Details  TU sec no AD  30 SSTS: 9x no UE assist    Hamstring 90/90 SLR R=(35) L=(35)  Ely's (-) bilat    General Comments:      Hip Comments       FOTO: 55% function, 71% predicted function                  Precautions: none noted  Access code: [email or web address]  Progress note:   POC:     Manuals                                        Neuro Re-Ed        Core brace        QS        Glut set                SLB        NBOS        Fitter board        Ther Ex        Nustep s=        Heel slide flex, abd Flex 10x       SLR        SAQ        Bridge 10x       LAQ        Ball squeeze        Reverse clamshell 10x       Leg press   Squat  HR        Prone hip extension 10x       Standing hip flex, ext, abd        Standing HR/TR                                        Ther Activity        Mini squat 10x       Total gym         Sit to stand        Gait Training        Mirror gait with vc                Modalities        CP

## 2024-07-05 DIAGNOSIS — E03.9 HYPOTHYROIDISM, UNSPECIFIED TYPE: ICD-10-CM

## 2024-07-05 RX ORDER — LEVOTHYROXINE SODIUM 0.05 MG/1
50 TABLET ORAL DAILY
Qty: 90 TABLET | Refills: 1 | Status: SHIPPED | OUTPATIENT
Start: 2024-07-05

## 2024-08-25 ENCOUNTER — RA CDI HCC (OUTPATIENT)
Dept: OTHER | Facility: HOSPITAL | Age: 64
End: 2024-08-25

## 2024-09-03 ENCOUNTER — OFFICE VISIT (OUTPATIENT)
Dept: FAMILY MEDICINE CLINIC | Facility: CLINIC | Age: 64
End: 2024-09-03
Payer: COMMERCIAL

## 2024-09-03 ENCOUNTER — PREP FOR PROCEDURE (OUTPATIENT)
Dept: FAMILY MEDICINE CLINIC | Facility: CLINIC | Age: 64
End: 2024-09-03

## 2024-09-03 VITALS
RESPIRATION RATE: 18 BRPM | BODY MASS INDEX: 29.59 KG/M2 | WEIGHT: 167 LBS | DIASTOLIC BLOOD PRESSURE: 80 MMHG | HEIGHT: 63 IN | SYSTOLIC BLOOD PRESSURE: 126 MMHG | HEART RATE: 80 BPM | TEMPERATURE: 98.5 F | OXYGEN SATURATION: 98 %

## 2024-09-03 DIAGNOSIS — Z12.11 SCREENING FOR COLON CANCER: ICD-10-CM

## 2024-09-03 DIAGNOSIS — Z12.11 COLON CANCER SCREENING: Primary | ICD-10-CM

## 2024-09-03 DIAGNOSIS — I10 PRIMARY HYPERTENSION: ICD-10-CM

## 2024-09-03 DIAGNOSIS — E03.9 ACQUIRED HYPOTHYROIDISM: Primary | ICD-10-CM

## 2024-09-03 DIAGNOSIS — M25.552 LEFT HIP PAIN: ICD-10-CM

## 2024-09-03 DIAGNOSIS — L57.0 ACTINIC KERATOSIS: ICD-10-CM

## 2024-09-03 PROCEDURE — G2211 COMPLEX E/M VISIT ADD ON: HCPCS | Performed by: NURSE PRACTITIONER

## 2024-09-03 PROCEDURE — 99214 OFFICE O/P EST MOD 30 MIN: CPT | Performed by: NURSE PRACTITIONER

## 2024-09-03 PROCEDURE — 3079F DIAST BP 80-89 MM HG: CPT | Performed by: NURSE PRACTITIONER

## 2024-09-03 PROCEDURE — 1159F MED LIST DOCD IN RCRD: CPT | Performed by: NURSE PRACTITIONER

## 2024-09-03 PROCEDURE — 1160F RVW MEDS BY RX/DR IN RCRD: CPT | Performed by: NURSE PRACTITIONER

## 2024-09-03 PROCEDURE — 3074F SYST BP LT 130 MM HG: CPT | Performed by: NURSE PRACTITIONER

## 2024-09-03 NOTE — TELEPHONE ENCOUNTER
Referring Provider KEM Blood      Pre- Screening:     There is no height or weight on file to calculate BMI.  Has patient been referred for a routine screening Colonoscopy? yes  Is the patient between 45-75 years old? yes       Previous Colonoscopy yes  If yes:    Date: 2019    Facility: Dr Mcarthur     Reason: Routine       SCHEDULING STAFF: If the patient is between 45yrs-49yrs, please advise patient to confirm benefits/coverage with their insurance company for a routine screening colonoscopy, some insurance carriers will only cover at 50yrs or older. If the patient is over 75years old, please schedule an office visit.     Does the patient want to see a Gastroenterologist prior to their procedure? no     Has the patient been hospitalized or had abdominal surgery in the past 6 months? no     Does the patient use supplemental oxygen? no     Does the patient take Coumadin, Lovenox, Plavix, Elliquis, Xarelto, or other blood thinning medication? no     Has the patient had a stroke, cardiac event, or stent placed in the past year? no     SCHEDULING STAFF: If patient answers NO to above questions, then schedule procedure. If patient answers YES to above questions, then schedule office appointment.     If patient is between 45yrs - 49yrs, please advise patient that we will have to confirm benefits & coverage with their insurance company for a routine screening colonoscopy.      [facility] colonoscopy - [facility] Prep
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
## 2024-09-30 ENCOUNTER — TELEPHONE (OUTPATIENT)
Dept: GASTROENTEROLOGY | Facility: CLINIC | Age: 64
End: 2024-09-30

## 2024-10-04 NOTE — TELEPHONE ENCOUNTER
Patient called in to go over prep instructions and to obtain arrival time. Patient is aware the endo center will be reaching out to patient between 2pm-6pm today to go over arrival time.

## 2024-10-07 ENCOUNTER — ANESTHESIA EVENT (OUTPATIENT)
Dept: GASTROENTEROLOGY | Facility: HOSPITAL | Age: 64
End: 2024-10-07
Payer: COMMERCIAL

## 2024-10-07 ENCOUNTER — ANESTHESIA (OUTPATIENT)
Dept: GASTROENTEROLOGY | Facility: HOSPITAL | Age: 64
End: 2024-10-07
Payer: COMMERCIAL

## 2024-10-07 ENCOUNTER — HOSPITAL ENCOUNTER (OUTPATIENT)
Dept: GASTROENTEROLOGY | Facility: HOSPITAL | Age: 64
Setting detail: OUTPATIENT SURGERY
Discharge: HOME | End: 2024-10-07
Payer: COMMERCIAL

## 2024-10-07 VITALS
BODY MASS INDEX: 29.59 KG/M2 | DIASTOLIC BLOOD PRESSURE: 61 MMHG | SYSTOLIC BLOOD PRESSURE: 101 MMHG | OXYGEN SATURATION: 98 % | TEMPERATURE: 98 F | WEIGHT: 167 LBS | RESPIRATION RATE: 16 BRPM | HEIGHT: 63 IN | HEART RATE: 66 BPM

## 2024-10-07 DIAGNOSIS — Z12.11 COLON CANCER SCREENING: ICD-10-CM

## 2024-10-07 PROCEDURE — G0121 COLON CA SCRN NOT HI RSK IND: HCPCS | Performed by: STUDENT IN AN ORGANIZED HEALTH CARE EDUCATION/TRAINING PROGRAM

## 2024-10-07 RX ORDER — PROPOFOL 10 MG/ML
INJECTION, EMULSION INTRAVENOUS CONTINUOUS PRN
Status: DISCONTINUED | OUTPATIENT
Start: 2024-10-07 | End: 2024-10-07

## 2024-10-07 RX ORDER — PROPOFOL 10 MG/ML
INJECTION, EMULSION INTRAVENOUS AS NEEDED
Status: DISCONTINUED | OUTPATIENT
Start: 2024-10-07 | End: 2024-10-07

## 2024-10-07 RX ORDER — ONDANSETRON 2 MG/ML
4 INJECTION INTRAMUSCULAR; INTRAVENOUS ONCE AS NEEDED
Status: CANCELLED | OUTPATIENT
Start: 2024-10-07

## 2024-10-07 RX ORDER — SODIUM CHLORIDE, SODIUM LACTATE, POTASSIUM CHLORIDE, CALCIUM CHLORIDE 600; 310; 30; 20 MG/100ML; MG/100ML; MG/100ML; MG/100ML
100 INJECTION, SOLUTION INTRAVENOUS CONTINUOUS
Status: DISCONTINUED | OUTPATIENT
Start: 2024-10-07 | End: 2024-10-11 | Stop reason: HOSPADM

## 2024-10-07 RX ORDER — LIDOCAINE HYDROCHLORIDE 10 MG/ML
INJECTION, SOLUTION EPIDURAL; INFILTRATION; INTRACAUDAL; PERINEURAL AS NEEDED
Status: DISCONTINUED | OUTPATIENT
Start: 2024-10-07 | End: 2024-10-07

## 2024-10-07 RX ADMIN — PROPOFOL 60 MG: 10 INJECTION, EMULSION INTRAVENOUS at 08:31

## 2024-10-07 RX ADMIN — SODIUM CHLORIDE, SODIUM LACTATE, POTASSIUM CHLORIDE, AND CALCIUM CHLORIDE 100 ML/HR: .6; .31; .03; .02 INJECTION, SOLUTION INTRAVENOUS at 08:09

## 2024-10-07 RX ADMIN — PROPOFOL 80 MCG/KG/MIN: 10 INJECTION, EMULSION INTRAVENOUS at 08:31

## 2024-10-07 RX ADMIN — LIDOCAINE HYDROCHLORIDE 50 MG: 10 INJECTION, SOLUTION EPIDURAL; INFILTRATION; INTRACAUDAL; PERINEURAL at 08:31

## 2024-10-07 NOTE — ANESTHESIA POSTPROCEDURE EVALUATION
Post-Op Assessment Note    CV Status:  Stable  Pain Score: 0    Pain management: adequate       Mental Status:  Alert and awake   Hydration Status:  Euvolemic   PONV Controlled:  Controlled   Airway Patency:  Patent  Two or more mitigation strategies used for obstructive sleep apnea   Post Op Vitals Reviewed: Yes    No anethesia notable event occurred.    Staff: CRNA               BP   101/58   Temp   98   Pulse  76   Resp   17   SpO2   100

## 2024-10-07 NOTE — ANESTHESIA PREPROCEDURE EVALUATION
Procedure:  COLONOSCOPY    Relevant Problems   ANESTHESIA (within normal limits)      CARDIO   (+) Hypertension   (-) Chest pain   (-) BORJA (dyspnea on exertion)      ENDO   (+) Hypothyroidism      GI/HEPATIC (within normal limits)  NPO confirmed  BMI 29.6      /RENAL (within normal limits)      HEMATOLOGY (within normal limits)      NEURO/PSYCH   (+) Stress reaction   (-) CVA (cerebral vascular accident) (HCC)      PULMONARY (within normal limits)   (-) Sleep apnea   (-) URI (upper respiratory infection)      No Known Allergies    Social History     Tobacco Use    Smoking status: Former     Current packs/day: 0.00     Types: Cigarettes     Quit date: 1988     Years since quittin.2    Smokeless tobacco: Never   Vaping Use    Vaping status: Never Used   Substance Use Topics    Alcohol use: No    Drug use: No     Current Outpatient Medications   Medication Instructions    azelastine (ASTELIN) 0.1 % nasal spray 1 spray, Nasal, 2 times daily, Use in each nostril as directed    cyclobenzaprine (FLEXERIL) 10 mg, Oral, 3 times daily PRN    levothyroxine 50 mcg, Oral, Daily    lisinopril (ZESTRIL) 10 mg, Oral, Daily    polyethylene glycol (GOLYTELY) 4000 mL solution 4,000 mL, Oral, Once     Lab Results   Component Value Date    WBC 4.73 2023    HGB 13.4 2023    HCT 42.5 2023     2023    SODIUM 139 2024    K 4.2 2024     2024    CO2 30 2024    BUN 15 2024    CREATININE 0.68 2024    AST 12 (L) 2024    ALT 12 2024    ALKPHOS 64 2024    TBILI 0.39 2024    ALB 4.0 2024     Vitals:    10/07/24 0803   BP: 132/71   Pulse: 86   Resp: 16   Temp: 98.1 °F (36.7 °C)   SpO2: 97%     EKG 23  Normal sinus rhythm  Low voltage QRS  Cannot rule out prior  Inferior infarct , age undetermined  Otherwise normal ECG  No previous ECGs available  Confirmed by Archie Capps ([id number]) on 2023 5:30:07 PM    Physical  Exam    Airway    Mallampati score: III  TM Distance: >3 FB  Neck ROM: full     Dental   Comment: Denies loose/chipped teeth, No notable dental hx     Cardiovascular  Rhythm: regular, Rate: normal, Cardiovascular exam normal    Pulmonary  Pulmonary exam normal Breath sounds clear to auscultation    Other Findings  post-pubertal.      Anesthesia Plan  ASA Score- 2     Anesthesia Type- IV sedation with anesthesia with ASA Monitors.         Additional Monitors:     Airway Plan:     Comment: O2 mask, natural airway, EtCO2 monitor. Risks discussed including awareness, aspiration, drug reactions and conversion to GA..       Plan Factors-Exercise tolerance (METS): >4 METS.Exercise comment: Limited by hip pain.    Chart reviewed. EKG reviewed.  Existing labs reviewed. Patient summary reviewed.    Patient is not a current smoker.              Induction- intravenous.    Postoperative Plan-     Perioperative Resuscitation Plan - Level 1 - Full Code.       Informed Consent- Anesthetic plan and risks discussed with patient.  I personally reviewed this patient with the CRNA. Discussed and agreed on the Anesthesia Plan with the CRNA..

## 2024-10-07 NOTE — H&P
"History and Physical -  [facility]  Angelina Yanes 64 y.o. female MRN: [id number]                  HPI: Angelina Yanes is a 64 y.o. year old female who presents for colon cancer screening      REVIEW OF SYSTEMS: Per the HPI, and otherwise unremarkable.    Historical Information   Past Medical History:   Diagnosis Date    Disease of thyroid gland     Genital herpes     Hypertension      Past Surgical History:   Procedure Laterality Date    APPENDECTOMY      CARPAL TUNNEL RELEASE      COLONOSCOPY      CYSTOSCOPY      TONSILLECTOMY       Social History   Social History     Substance and Sexual Activity   Alcohol Use No     Social History     Substance and Sexual Activity   Drug Use No     Social History     Tobacco Use   Smoking Status Former    Current packs/day: 0.00    Types: Cigarettes    Quit date: 1988    Years since quittin.2   Smokeless Tobacco Never     Family History   Problem Relation Age of Onset    Heart disease Mother     Diabetes Mother     Heart disease Father     No Known Problems Sister     No Known Problems Maternal Grandmother     No Known Problems Maternal Grandfather     No Known Problems Paternal Grandmother     No Known Problems Paternal Grandfather     No Known Problems Maternal Aunt     No Known Problems Maternal Aunt     Breast cancer Other        Meds/Allergies       Current Outpatient Medications:     levothyroxine 50 mcg tablet    lisinopril (ZESTRIL) 10 mg tablet    azelastine (ASTELIN) 0.1 % nasal spray    cyclobenzaprine (FLEXERIL) 10 mg tablet    polyethylene glycol (GOLYTELY) 4000 mL solution    Current Facility-Administered Medications:     lactated ringers infusion, 100 mL/hr, Intravenous, Continuous    No Known Allergies    Objective     /71   Pulse 86   Temp 98.1 °F (36.7 °C) (Temporal)   Resp 16   Ht 5' 3\" (1.6 m)   Wt 75.8 kg (167 lb)   SpO2 97%   BMI 29.58 kg/m²       PHYSICAL EXAM    Gen: NAD  Head: NCAT  CV: RRR  CHEST: " Clear  ABD: soft, NT/ND  EXT: no edema      ASSESSMENT/PLAN:  This is a 64 y.o. year old female here for colonoscopy, and she is stable and optimized for her procedure.

## 2024-10-08 NOTE — ANESTHESIA POSTPROCEDURE EVALUATION
Post-Op Assessment Note            No anethesia notable event occurred.            Last Filed PACU Vitals:  Vitals Value Taken Time   Temp 98 °F (36.7 °C) 10/07/24 0919   Pulse 66 10/07/24 0919   /61 10/07/24 0919   Resp 16 10/07/24 0919   SpO2 98 % 10/07/24 0856       Modified King:  Activity: 2 (10/7/2024  9:20 AM)  Respiration: 2 (10/7/2024  9:20 AM)  Circulation: 2 (10/7/2024  9:20 AM)  Consciousness: 2 (10/7/2024  9:20 AM)  Oxygen Saturation: 2 (10/7/2024  9:20 AM)  Modified King Score: 10 (10/7/2024  9:20 AM)

## 2024-10-30 DIAGNOSIS — I10 HYPERTENSION, UNSPECIFIED TYPE: ICD-10-CM

## 2024-10-30 RX ORDER — LISINOPRIL 10 MG/1
10 TABLET ORAL DAILY
Qty: 90 TABLET | Refills: 1 | Status: SHIPPED | OUTPATIENT
Start: 2024-10-30

## 2024-10-30 NOTE — TELEPHONE ENCOUNTER
Reason for call:   [x] Refill   [] Prior Auth  [] Other:     Office:   [x] PCP/Provider - Lorraine Sim   [] Specialty/Provider -     Medication: lisinopril     Dose/Frequency: 10 mg take one daily     Quantity: 90    Pharmacy: [facility] [place]    Does the patient have enough for 3 days?   [x] Yes   [] No - Send as HP to POD

## 2024-11-13 ENCOUNTER — HOSPITAL ENCOUNTER (OUTPATIENT)
Dept: MAMMOGRAPHY | Facility: HOSPITAL | Age: 64
Discharge: HOME | End: 2024-11-13
Payer: COMMERCIAL

## 2024-11-13 DIAGNOSIS — Z12.31 ENCOUNTER FOR SCREENING MAMMOGRAM FOR MALIGNANT NEOPLASM OF BREAST: ICD-10-CM

## 2024-11-13 PROCEDURE — 77063 BREAST TOMOSYNTHESIS BI: CPT

## 2024-11-13 PROCEDURE — 77067 SCR MAMMO BI INCL CAD: CPT

## 2024-11-15 ENCOUNTER — RESULTS FOLLOW-UP (OUTPATIENT)
Dept: FAMILY MEDICINE CLINIC | Facility: CLINIC | Age: 64
End: 2024-11-15

## 2024-11-19 ENCOUNTER — OFFICE VISIT (OUTPATIENT)
Dept: FAMILY MEDICINE CLINIC | Facility: CLINIC | Age: 64
End: 2024-11-19
Payer: COMMERCIAL

## 2024-11-19 VITALS
TEMPERATURE: 98.1 F | HEART RATE: 86 BPM | RESPIRATION RATE: 18 BRPM | BODY MASS INDEX: 28.88 KG/M2 | HEIGHT: 63 IN | SYSTOLIC BLOOD PRESSURE: 126 MMHG | DIASTOLIC BLOOD PRESSURE: 86 MMHG | WEIGHT: 163 LBS | OXYGEN SATURATION: 98 %

## 2024-11-19 DIAGNOSIS — M54.89 OTHER ACUTE BACK PAIN: ICD-10-CM

## 2024-11-19 DIAGNOSIS — M79.604 BILATERAL LEG PAIN: Primary | ICD-10-CM

## 2024-11-19 DIAGNOSIS — M79.605 BILATERAL LEG PAIN: Primary | ICD-10-CM

## 2024-11-19 PROCEDURE — 99213 OFFICE O/P EST LOW 20 MIN: CPT | Performed by: NURSE PRACTITIONER

## 2024-11-19 PROCEDURE — G2211 COMPLEX E/M VISIT ADD ON: HCPCS | Performed by: NURSE PRACTITIONER

## 2024-11-19 RX ORDER — KRILL/OM-3/DHA/EPA/PHOSPHO/AST 500-110 MG
CAPSULE ORAL
COMMUNITY

## 2024-11-19 RX ORDER — LANOLIN ALCOHOL/MO/W.PET/CERES
1 CREAM (GRAM) TOPICAL 2 TIMES DAILY
COMMUNITY

## 2024-11-19 NOTE — PROGRESS NOTES
"Name: Angelina Yanes      : 1960      MRN: [id number]  Encounter Provider: KEM Blood  Encounter Date: 2024   Encounter department: [facility] PRIMARY [facility]ARE  :  Assessment & Plan  Bilateral leg pain    Orders:    Ambulatory Referral to Chiropractic; Future    Ambulatory Referral to Physical Therapy; Future    Other acute back pain  Recommended she get a mattress to put on her first floor to avoid sleeping on couch with her dog    Orders:    Ambulatory Referral to Chiropractic; Future    Ambulatory Referral to Physical Therapy; Future          Depression Screening and Follow-up Plan: Patient was screened for depression during today's encounter. They screened negative with a PHQ-2 score of 0.      History of Present Illness     Here continued left hip pain- seen by Ortho (Dr. Hearn), told to do physical therapy, went to one visit but financially was unable to continue- is now able to go to physical therapy.   Now has bilateral hip and back pain- under shoulder blades.   Leg pain comes/goes- 7-8 out of 10. Sleeps on the couch every night because her dog is 17 years old.       Review of Systems   Musculoskeletal:  Positive for arthralgias, back pain, gait problem and myalgias.          Objective   /86   Pulse 86   Temp 98.1 °F (36.7 °C)   Resp 18   Ht 5' 3\" (1.6 m)   Wt 73.9 kg (163 lb)   SpO2 98%   BMI 28.87 kg/m²      Physical Exam  Vitals and nursing note reviewed.   Constitutional:       General: She is not in acute distress.     Appearance: She is well-developed. She is not diaphoretic.   Cardiovascular:      Rate and Rhythm: Normal rate and regular rhythm.      Heart sounds: Normal heart sounds. No murmur heard.  Pulmonary:      Effort: Pulmonary effort is normal. No respiratory distress.      Breath sounds: Normal breath sounds. No wheezing.   Musculoskeletal:         General: Tenderness present. No deformity.        Back:       Right lower leg: Tenderness " present. No edema.      Left lower leg: Tenderness present. No edema.   Skin:     General: Skin is warm and dry.      Findings: No erythema or rash.   Neurological:      Mental Status: She is alert and oriented to person, place, and time.   Psychiatric:         Mood and Affect: Mood normal.         Behavior: Behavior normal. Behavior is cooperative.         Thought Content: Thought content normal.         Judgment: Judgment normal.

## 2024-11-25 ENCOUNTER — OFFICE VISIT (OUTPATIENT)
Dept: URGENT CARE | Facility: CLINIC | Age: 64
End: 2024-11-25
Payer: COMMERCIAL

## 2024-11-25 ENCOUNTER — APPOINTMENT (OUTPATIENT)
Dept: URGENT CARE | Facility: CLINIC | Age: 64
End: 2024-11-25
Payer: COMMERCIAL

## 2024-11-25 VITALS
WEIGHT: 171 LBS | HEART RATE: 80 BPM | HEIGHT: 63 IN | TEMPERATURE: 98.3 F | BODY MASS INDEX: 30.3 KG/M2 | SYSTOLIC BLOOD PRESSURE: 130 MMHG | RESPIRATION RATE: 18 BRPM | OXYGEN SATURATION: 98 % | DIASTOLIC BLOOD PRESSURE: 70 MMHG

## 2024-11-25 DIAGNOSIS — H10.9 CONJUNCTIVITIS OF BOTH EYES, UNSPECIFIED CONJUNCTIVITIS TYPE: Primary | ICD-10-CM

## 2024-11-25 PROCEDURE — S9083 URGENT CARE CENTER GLOBAL: HCPCS | Performed by: ORTHOPAEDIC SURGERY

## 2024-11-25 PROCEDURE — 99213 OFFICE O/P EST LOW 20 MIN: CPT | Performed by: ORTHOPAEDIC SURGERY

## 2024-11-25 RX ORDER — OFLOXACIN 3 MG/ML
SOLUTION/ DROPS OPHTHALMIC
Qty: 5 ML | Refills: 0 | Status: SHIPPED | OUTPATIENT
Start: 2024-11-25

## 2024-11-25 RX ORDER — TETRACAINE HYDROCHLORIDE 5 MG/ML
2 SOLUTION OPHTHALMIC ONCE
Status: COMPLETED | OUTPATIENT
Start: 2024-11-25 | End: 2024-11-25

## 2024-11-25 RX ORDER — CYCLOSPORINE 0.5 MG/ML
EMULSION OPHTHALMIC
COMMUNITY
Start: 2024-10-09

## 2024-11-25 RX ADMIN — TETRACAINE HYDROCHLORIDE 2 DROP: 5 SOLUTION OPHTHALMIC at 13:40

## 2024-11-25 NOTE — PATIENT INSTRUCTIONS
Use antibiotic eyedrops as prescribed  If your conjunctivitis is caused by a virus, it may take 2-3 weeks for resolution of symptoms  May take over-the-counter Claritin  In between doses of the antibiotic eye drop you may also use OTC lubricating eye drops such as Thera Tears   Cold/warm compresses for symptom relief  Throw out old eye makeup and do not wear makeup during treatment  Do not wear contact lenses until symptoms completely resolve  Wash hands frequently to prevent spread  Change pillow cases daily  May return to school/work within 24 hours of starting antibiotic eyedrops  Follow up with PCP in 3-5 days  Proceed to the ED if symptoms worsen

## 2024-11-25 NOTE — PROGRESS NOTES
[facility]        NAME: Angelina Yanes is a 64 y.o. female  : 1960    MRN: [id number]  DATE: 2024  TIME: 4:41 PM    Assessment and Plan   Conjunctivitis of both eyes, unspecified conjunctivitis type [H10.9]  1. Conjunctivitis of both eyes, unspecified conjunctivitis type  ofloxacin (OCUFLOX) 0.3 % ophthalmic solution    fluorescein sodium sterile 1 mg ophthalmic strip 1 strip    tetracaine 0.5 % ophthalmic solution 2 drop    Foreign body removal            Patient Instructions     Use antibiotic eyedrops as prescribed  If your conjunctivitis is caused by a virus, it may take 2-3 weeks for resolution of symptoms  May take over-the-counter Claritin  In between doses of the antibiotic eye drop you may also use OTC lubricating eye drops such as Thera Tears   Cold/warm compresses for symptom relief  Throw out old eye makeup and do not wear makeup during treatment  Do not wear contact lenses until symptoms completely resolve  Wash hands frequently to prevent spread  Change pillow cases daily  May return to school/work within 24 hours of starting antibiotic eyedrops  Follow up with PCP in 3-5 days  Proceed to the ED if symptoms worsen      If tests are performed, our office will contact you with results only if changes need to made to the care plan discussed with you at the visit. You can review your full results on [facility].    Chief Complaint     Chief Complaint   Patient presents with    Eye Pain     After using glitter on Saturday,  was crusty and drainage. She thinks she has pink eye. Vision check without correction. Right 20/30 Left 20/25.          History of Present Illness       64-year-old female presents to urgent care for bilateral eye irritation, redness, crusting.  Symptoms started .  She does note a recent cold.  Patient has a history of cataract surgery.  She notes that although her vision is a bit blurry due to the drainage she has not had any  significant changes.  The patient does voice her concerns as over the weekend she was working on crafts that contained glitter, and feels that she had gotten some in the eyes.        Review of Systems   Review of Systems   Constitutional:  Negative for chills and fever.   HENT:  Positive for congestion. Negative for ear pain and sore throat.    Eyes:  Positive for pain, discharge, redness and visual disturbance.   Respiratory:  Positive for cough. Negative for shortness of breath.    Cardiovascular:  Negative for chest pain and palpitations.   Gastrointestinal:  Negative for abdominal pain and vomiting.   Genitourinary:  Negative for dysuria and hematuria.   Musculoskeletal:  Negative for arthralgias and back pain.   Skin:  Negative for color change and rash.   Neurological:  Negative for dizziness, seizures, syncope, light-headedness and headaches.   All other systems reviewed and are negative.        Current Medications       Current Outpatient Medications:     calcium citrate-vitamin D 315 mg-5 mcg tablet, Take 1 tablet by mouth 2 (two) times a day, Disp: , Rfl:     Garlic 400 MG TBEC, Take by mouth, Disp: , Rfl:     Krill Oil (Omega-3) 500 MG CAPS, Take by mouth, Disp: , Rfl:     levothyroxine 50 mcg tablet, Take 1 tablet by mouth once daily, Disp: 90 tablet, Rfl: 1    lisinopril (ZESTRIL) 10 mg tablet, Take 1 tablet (10 mg total) by mouth daily, Disp: 90 tablet, Rfl: 1    ofloxacin (OCUFLOX) 0.3 % ophthalmic solution, Apply 2 drops in the left eye every 2 hours while awake for 2 days, THEN 2 drops in the left eye 4 times daily for 5 days, Disp: 5 mL, Rfl: 0    Restasis 0.05 % ophthalmic emulsion, INSTILL 1 DROP INTO EACH EYE EVERY 12 HOURS, Disp: , Rfl:     cyclobenzaprine (FLEXERIL) 10 mg tablet, Take 1 tablet (10 mg total) by mouth 3 (three) times a day as needed for muscle spasms (Patient not taking: Reported on 4/1/2024), Disp: 30 tablet, Rfl: 2    Current Allergies     Allergies as of 11/25/2024    (No  "Known Allergies)            The following portions of the patient's history were reviewed and updated as appropriate: allergies, current medications, past family history, past medical history, past social history, past surgical history and problem list.     Past Medical History:   Diagnosis Date    Disease of thyroid gland     Genital herpes     Hypertension        Past Surgical History:   Procedure Laterality Date    APPENDECTOMY      CARPAL TUNNEL RELEASE      COLONOSCOPY      CYSTOSCOPY      TONSILLECTOMY         Family History   Problem Relation Age of Onset    Heart disease Mother     Diabetes Mother     Heart disease Father     No Known Problems Sister     No Known Problems Maternal Grandmother     No Known Problems Maternal Grandfather     No Known Problems Paternal Grandmother     No Known Problems Paternal Grandfather     No Known Problems Maternal Aunt     No Known Problems Maternal Aunt     Breast cancer Other          Medications have been verified.        Objective   /70   Pulse 80   Temp 98.3 °F (36.8 °C)   Resp 18   Ht 5' 3\" (1.6 m)   Wt 77.6 kg (171 lb)   SpO2 98%   BMI 30.29 kg/m²        Physical Exam     Physical Exam  Vitals and nursing note reviewed.   Constitutional:       General: She is not in acute distress.     Appearance: Normal appearance. She is not ill-appearing.   HENT:      Head: Normocephalic and atraumatic.      Nose: Nose normal.      Mouth/Throat:      Mouth: Mucous membranes are moist.      Pharynx: Oropharynx is clear.   Eyes:      Extraocular Movements: Extraocular movements intact.      Pupils: Pupils are equal, round, and reactive to light.      Comments: Bilateral eye conjunctival injection.  No excess watering, no drainage.  EOM intact without pain.  No periorbital erythema or edema.   Cardiovascular:      Rate and Rhythm: Normal rate.      Pulses: Normal pulses.   Pulmonary:      Effort: Pulmonary effort is normal. No respiratory distress.   Musculoskeletal:    " "     General: Normal range of motion.      Cervical back: Normal range of motion.   Skin:     General: Skin is warm and dry.      Capillary Refill: Capillary refill takes less than 2 seconds.   Neurological:      General: No focal deficit present.      Mental Status: She is alert and oriented to person, place, and time.   Psychiatric:         Mood and Affect: Mood normal.         Behavior: Behavior normal.           Universal Protocol:  Consent: Verbal consent obtained.  Risks and benefits: risks, benefits and alternatives were discussed  Consent given by: patient  Time out: Immediately prior to procedure a \"time out\" was called to verify the correct patient, procedure, equipment, support staff and site/side marked as required.  Timeout called at: 11/25/2024 11:30 AM.  Patient understanding: patient states understanding of the procedure being performed  Patient identity confirmed: verbally with patient  Foreign body removal    Date/Time: 11/25/2024 11:30 AM    Performed by: Nicole Monroe PA-C  Authorized by: Nicole Monroe PA-C  Body area: eye  Location: bilateral eyes.    Anesthesia:  Local Anesthetic: tetracaine drops  Anesthetic total (drops): 2 (2 drops each eye)Localization method: eyelid eversion  Eye examined with fluorescein.  No fluorescein uptake.  Complexity: simple            "

## 2024-11-27 NOTE — PROGRESS NOTES
Name: Angelina Yanes      : 1960      MRN: [id number]  Encounter Provider: Temo Cuellar MD  Encounter Date: 2024   Encounter department: [facility] [place]  :  Assessment & Plan  Benign neoplasm of skin of chest  Patient is a pleasant 64-year-old female presenting with a bothersome benign appearing skin neoplasm on her left chest for which definitive treatment by excisional biopsy is now indicated.    The technical details of the procedure reviewed and informed verbal consent obtained to proceed.         Inflamed seborrheic keratosis  Patient has 2 benign-appearing inflamed seborrheic keratoses over the bra strap of the right shoulder for which cryotherapy is now indicated.    The technical details of the procedure reviewed and informed verbal consent obtained to proceed.             History of Present Illness     HPI  Angelina Yanes is a 64 y.o. female who presents to the office with a mole on her left chest and rights shoulder that have been present for two years .States that the left chest mole is getting larger.  Denied any family history of skin cancer. On no blood thnners.  History obtained from: patient    Review of Systems   Constitutional:  Negative for chills and fever.   HENT:  Negative for ear pain and sore throat.    Eyes:  Negative for pain and visual disturbance.   Respiratory:  Negative for cough and shortness of breath.    Cardiovascular:  Negative for chest pain and palpitations.   Gastrointestinal:  Negative for abdominal pain and vomiting.   Genitourinary:  Negative for dysuria and hematuria.   Musculoskeletal:  Negative for arthralgias and back pain.   Skin:  Negative for color change and rash.   Neurological:  Negative for seizures and syncope.   All other systems reviewed and are negative.    Past Medical History   Past Medical History:   Diagnosis Date    Disease of thyroid gland     Genital herpes     Hypertension      Past Surgical History:    Procedure Laterality Date    APPENDECTOMY      CARPAL TUNNEL RELEASE      COLONOSCOPY      CYSTOSCOPY      TONSILLECTOMY       Family History   Problem Relation Age of Onset    Heart disease Mother     Diabetes Mother     Heart disease Father     No Known Problems Sister     No Known Problems Maternal Grandmother     No Known Problems Maternal Grandfather     No Known Problems Paternal Grandmother     No Known Problems Paternal Grandfather     No Known Problems Maternal Aunt     No Known Problems Maternal Aunt     Breast cancer Other       reports that she quit smoking about 36 years ago. Her smoking use included cigarettes. She has never used smokeless tobacco. She reports that she does not drink alcohol and does not use drugs.  Current Outpatient Medications on File Prior to Visit   Medication Sig Dispense Refill    calcium citrate-vitamin D 315 mg-5 mcg tablet Take 1 tablet by mouth 2 (two) times a day      Garlic 400 MG TBEC Take by mouth      Krill Oil (Omega-3) 500 MG CAPS Take by mouth      levothyroxine 50 mcg tablet Take 1 tablet by mouth once daily 90 tablet 1    lisinopril (ZESTRIL) 10 mg tablet Take 1 tablet (10 mg total) by mouth daily 90 tablet 1    ofloxacin (OCUFLOX) 0.3 % ophthalmic solution Apply 2 drops in the left eye every 2 hours while awake for 2 days, THEN 2 drops in the left eye 4 times daily for 5 days 5 mL 0    Restasis 0.05 % ophthalmic emulsion INSTILL 1 DROP INTO EACH EYE EVERY 12 HOURS      Turmeric (QC TUMERIC COMPLEX PO) Take by mouth      cyclobenzaprine (FLEXERIL) 10 mg tablet Take 1 tablet (10 mg total) by mouth 3 (three) times a day as needed for muscle spasms (Patient not taking: Reported on 12/2/2024) 30 tablet 2     No current facility-administered medications on file prior to visit.   No Known Allergies   Pertinent Medical History          Medical History Reviewed by provider this encounter:     .  Past Medical History   Past Medical History:   Diagnosis Date    Disease of  thyroid gland     Genital herpes     Hypertension      Past Surgical History:   Procedure Laterality Date    APPENDECTOMY      CARPAL TUNNEL RELEASE      COLONOSCOPY      CYSTOSCOPY      TONSILLECTOMY       Family History   Problem Relation Age of Onset    Heart disease Mother     Diabetes Mother     Heart disease Father     No Known Problems Sister     No Known Problems Maternal Grandmother     No Known Problems Maternal Grandfather     No Known Problems Paternal Grandmother     No Known Problems Paternal Grandfather     No Known Problems Maternal Aunt     No Known Problems Maternal Aunt     Breast cancer Other       reports that she quit smoking about 36 years ago. Her smoking use included cigarettes. She has never used smokeless tobacco. She reports that she does not drink alcohol and does not use drugs.  Current Outpatient Medications on File Prior to Visit   Medication Sig Dispense Refill    calcium citrate-vitamin D 315 mg-5 mcg tablet Take 1 tablet by mouth 2 (two) times a day      Garlic 400 MG TBEC Take by mouth      Krill Oil (Omega-3) 500 MG CAPS Take by mouth      levothyroxine 50 mcg tablet Take 1 tablet by mouth once daily 90 tablet 1    lisinopril (ZESTRIL) 10 mg tablet Take 1 tablet (10 mg total) by mouth daily 90 tablet 1    ofloxacin (OCUFLOX) 0.3 % ophthalmic solution Apply 2 drops in the left eye every 2 hours while awake for 2 days, THEN 2 drops in the left eye 4 times daily for 5 days 5 mL 0    Restasis 0.05 % ophthalmic emulsion INSTILL 1 DROP INTO EACH EYE EVERY 12 HOURS      Turmeric (QC TUMERIC COMPLEX PO) Take by mouth      cyclobenzaprine (FLEXERIL) 10 mg tablet Take 1 tablet (10 mg total) by mouth 3 (three) times a day as needed for muscle spasms (Patient not taking: Reported on 12/2/2024) 30 tablet 2     No current facility-administered medications on file prior to visit.   No Known Allergies   Current Outpatient Medications on File Prior to Visit   Medication Sig Dispense Refill     "calcium citrate-vitamin D 315 mg-5 mcg tablet Take 1 tablet by mouth 2 (two) times a day      Garlic 400 MG TBEC Take by mouth      Krill Oil (Omega-3) 500 MG CAPS Take by mouth      levothyroxine 50 mcg tablet Take 1 tablet by mouth once daily 90 tablet 1    lisinopril (ZESTRIL) 10 mg tablet Take 1 tablet (10 mg total) by mouth daily 90 tablet 1    ofloxacin (OCUFLOX) 0.3 % ophthalmic solution Apply 2 drops in the left eye every 2 hours while awake for 2 days, THEN 2 drops in the left eye 4 times daily for 5 days 5 mL 0    Restasis 0.05 % ophthalmic emulsion INSTILL 1 DROP INTO EACH EYE EVERY 12 HOURS      Turmeric (QC TUMERIC COMPLEX PO) Take by mouth      cyclobenzaprine (FLEXERIL) 10 mg tablet Take 1 tablet (10 mg total) by mouth 3 (three) times a day as needed for muscle spasms (Patient not taking: Reported on 2024) 30 tablet 2     No current facility-administered medications on file prior to visit.      Social History     Tobacco Use    Smoking status: Former     Current packs/day: 0.00     Types: Cigarettes     Quit date: 1988     Years since quittin.3    Smokeless tobacco: Never   Vaping Use    Vaping status: Never Used   Substance and Sexual Activity    Alcohol use: No    Drug use: No    Sexual activity: Not Currently        Objective   /72 (BP Location: Right arm, Patient Position: Sitting, Cuff Size: Large)   Pulse 91   Temp 97.8 °F (36.6 °C) (Temporal)   Resp 18   Ht 5' 3\" (1.6 m)   Wt 77.1 kg (170 lb)   SpO2 98%   BMI 30.11 kg/m²      Physical Exam  Vitals and nursing note reviewed.   Constitutional:       General: She is not in acute distress.     Appearance: She is well-developed.   HENT:      Head: Normocephalic and atraumatic.   Eyes:      Conjunctiva/sclera: Conjunctivae normal.   Cardiovascular:      Rate and Rhythm: Normal rate and regular rhythm.      Heart sounds: No murmur heard.  Pulmonary:      Effort: Pulmonary effort is normal. No respiratory distress.      " "Breath sounds: Normal breath sounds.   Abdominal:      Palpations: Abdomen is soft.      Tenderness: There is no abdominal tenderness.   Musculoskeletal:         General: No swelling.      Cervical back: Neck supple.   Skin:     General: Skin is warm and dry.      Capillary Refill: Capillary refill takes less than 2 seconds.      Comments: Left chest 14 x 9 mm skin lesion  Right shoulder 4 mm and 3 mm inflamed seborrheic keratoses   Neurological:      Mental Status: She is alert.   Psychiatric:         Mood and Affect: Mood normal.       Skin excision    Date/Time: 12/2/2024 8:00 AM    Performed by: Temo Cuellar MD  Authorized by: Temo Cuellar MD  Universal Protocol:  Consent: Verbal consent obtained.  Risks and benefits: risks, benefits and alternatives were discussed  Consent given by: patient  Time out: Immediately prior to procedure a \"time out\" was called to verify the correct patient, procedure, equipment, support staff and site/side marked as required.  Timeout called at: 12/2/2024 8:42 AM.  Patient understanding: patient states understanding of the procedure being performed  Patient consent: the patient's understanding of the procedure matches consent given  Site marked: the operative site was marked  Patient identity confirmed: verbally with patient    Procedure Details - Skin Excision:     Number of Lesions:  1  Lesion 1:     Body area:  Trunk    Trunk location:  Chest    Malignancy: benign lesion            Final defect size (mm):  10    Repair type:  Linear closure    Closure complexity: simple       Repair Comments: Procedure note: With informed verbal consent under sterile conditions using aseptic technique using 1% lidocaine local anesthesia with epinephrine and bicarbonate the 14 mm left chest lesion was sharply excised with a 15 blade and the wound closed primarily with vertical mattress sutures of 4-0 nylon  Lesion Destruction    Date/Time: 12/2/2024 8:00 AM    Performed by: Temo" "MD Polo  Authorized by: Temo Cuellar MD  Universal Protocol:  Consent: Verbal consent obtained.  Risks and benefits: risks, benefits and alternatives were discussed  Consent given by: patient  Time out: Immediately prior to procedure a \"time out\" was called to verify the correct patient, procedure, equipment, support staff and site/side marked as required.  Timeout called at: 12/2/2024 8:43 AM.  Patient understanding: patient states understanding of the procedure being performed  Patient consent: the patient's understanding of the procedure matches consent given  Site marked: the operative site was marked  Patient identity confirmed: verbally with patient    Procedure Details - Lesion Destruction:     Number of Lesions:  2  Lesion 1:     Body area:  Upper extremity    Upper extremity location:  R shoulder    Malignancy: benign lesion      Destruction method: cryotherapy    Lesion 2:     Body area:  Upper extremity    Upper extremity location:  R shoulder    Malignancy: benign lesion      Destruction method: cryotherapy        Administrative Statements   I have spent a total time of 15 minutes in caring for this patient on the day of the visit/encounter including Risks and benefits of tx options. Topics discussed with the patient / family include goals of care.  "

## 2024-11-28 ENCOUNTER — PATIENT MESSAGE (OUTPATIENT)
Dept: FAMILY MEDICINE CLINIC | Facility: CLINIC | Age: 64
End: 2024-11-28

## 2024-12-02 ENCOUNTER — OFFICE VISIT (OUTPATIENT)
Dept: SURGERY | Facility: CLINIC | Age: 64
End: 2024-12-02
Payer: COMMERCIAL

## 2024-12-02 VITALS
SYSTOLIC BLOOD PRESSURE: 120 MMHG | TEMPERATURE: 97.8 F | BODY MASS INDEX: 30.12 KG/M2 | OXYGEN SATURATION: 98 % | HEIGHT: 63 IN | RESPIRATION RATE: 18 BRPM | DIASTOLIC BLOOD PRESSURE: 72 MMHG | WEIGHT: 170 LBS | HEART RATE: 91 BPM

## 2024-12-02 DIAGNOSIS — M79.605 BILATERAL LEG PAIN: Primary | ICD-10-CM

## 2024-12-02 DIAGNOSIS — D23.5 BENIGN NEOPLASM OF SKIN OF CHEST: Primary | ICD-10-CM

## 2024-12-02 DIAGNOSIS — M79.604 BILATERAL LEG PAIN: Primary | ICD-10-CM

## 2024-12-02 DIAGNOSIS — L82.0 INFLAMED SEBORRHEIC KERATOSIS: ICD-10-CM

## 2024-12-02 PROCEDURE — 17110 DESTRUCTION B9 LES UP TO 14: CPT | Performed by: SURGERY

## 2024-12-02 PROCEDURE — 99213 OFFICE O/P EST LOW 20 MIN: CPT | Performed by: SURGERY

## 2024-12-02 PROCEDURE — 11401 EXC TR-EXT B9+MARG 0.6-1 CM: CPT | Performed by: SURGERY

## 2024-12-02 PROCEDURE — 88305 TISSUE EXAM BY PATHOLOGIST: CPT | Performed by: PATHOLOGY

## 2024-12-02 NOTE — ASSESSMENT & PLAN NOTE
Patient is a pleasant 64-year-old female presenting with a bothersome benign appearing skin neoplasm on her left chest for which definitive treatment by excisional biopsy is now indicated.    The technical details of the procedure reviewed and informed verbal consent obtained to proceed.

## 2024-12-02 NOTE — PATIENT COMMUNICATION
"Pt called stating that she is wondering if she should see a rheumatologist for her pain. She has been doing research on rheumatoid Arthritis and is wondering if this could be the start of that. She states that she does not feel the pain is \" bone on bone\"  She is looking for recommendations from Lorraine BROWNLEE if she believes she should see Ortho, or if she should get a referral for Rheumatology. Please review and advise patient. [phone]   "

## 2024-12-02 NOTE — ASSESSMENT & PLAN NOTE
Patient has 2 benign-appearing inflamed seborrheic keratoses over the bra strap of the right shoulder for which cryotherapy is now indicated.    The technical details of the procedure reviewed and informed verbal consent obtained to proceed.

## 2024-12-03 ENCOUNTER — APPOINTMENT (OUTPATIENT)
Dept: LAB | Facility: CLINIC | Age: 64
End: 2024-12-03
Payer: COMMERCIAL

## 2024-12-03 ENCOUNTER — TELEPHONE (OUTPATIENT)
Dept: FAMILY MEDICINE CLINIC | Facility: CLINIC | Age: 64
End: 2024-12-03

## 2024-12-03 DIAGNOSIS — M79.605 BILATERAL LEG PAIN: ICD-10-CM

## 2024-12-03 DIAGNOSIS — M79.605 BILATERAL LEG PAIN: Primary | ICD-10-CM

## 2024-12-03 DIAGNOSIS — Z13.220 SCREENING CHOLESTEROL LEVEL: ICD-10-CM

## 2024-12-03 DIAGNOSIS — Z00.00 MEDICARE ANNUAL WELLNESS VISIT, SUBSEQUENT: ICD-10-CM

## 2024-12-03 DIAGNOSIS — E03.9 ACQUIRED HYPOTHYROIDISM: ICD-10-CM

## 2024-12-03 DIAGNOSIS — M79.604 BILATERAL LEG PAIN: ICD-10-CM

## 2024-12-03 DIAGNOSIS — M79.604 BILATERAL LEG PAIN: Primary | ICD-10-CM

## 2024-12-03 LAB
ALBUMIN SERPL BCG-MCNC: 4.2 G/DL (ref 3.5–5)
ALP SERPL-CCNC: 80 U/L (ref 34–104)
ALT SERPL W P-5'-P-CCNC: 15 U/L (ref 7–52)
ANION GAP SERPL CALCULATED.3IONS-SCNC: 8 MMOL/L (ref 4–13)
AST SERPL W P-5'-P-CCNC: 15 U/L (ref 13–39)
BASOPHILS # BLD AUTO: 0.03 THOUSANDS/ΜL (ref 0–0.1)
BASOPHILS NFR BLD AUTO: 0 % (ref 0–1)
BILIRUB SERPL-MCNC: 0.22 MG/DL (ref 0.2–1)
BUN SERPL-MCNC: 21 MG/DL (ref 5–25)
CALCIUM SERPL-MCNC: 8.7 MG/DL (ref 8.4–10.2)
CHLORIDE SERPL-SCNC: 104 MMOL/L (ref 96–108)
CHOLEST SERPL-MCNC: 206 MG/DL (ref ?–200)
CO2 SERPL-SCNC: 28 MMOL/L (ref 21–32)
CREAT SERPL-MCNC: 0.67 MG/DL (ref 0.6–1.3)
CRP SERPL QL: 13.6 MG/L
EOSINOPHIL # BLD AUTO: 0.16 THOUSAND/ΜL (ref 0–0.61)
EOSINOPHIL NFR BLD AUTO: 2 % (ref 0–6)
ERYTHROCYTE [DISTWIDTH] IN BLOOD BY AUTOMATED COUNT: 13.2 % (ref 11.6–15.1)
GFR SERPL CREATININE-BSD FRML MDRD: 91 ML/MIN/1.73SQ M
GLUCOSE P FAST SERPL-MCNC: 101 MG/DL (ref 65–99)
HCT VFR BLD AUTO: 41.7 % (ref 34.8–46.1)
HDLC SERPL-MCNC: 58 MG/DL
HGB BLD-MCNC: 13 G/DL (ref 11.5–15.4)
IMM GRANULOCYTES # BLD AUTO: 0.04 THOUSAND/UL (ref 0–0.2)
IMM GRANULOCYTES NFR BLD AUTO: 1 % (ref 0–2)
LDLC SERPL CALC-MCNC: 120 MG/DL (ref 0–100)
LYMPHOCYTES # BLD AUTO: 1.58 THOUSANDS/ΜL (ref 0.6–4.47)
LYMPHOCYTES NFR BLD AUTO: 22 % (ref 14–44)
MCH RBC QN AUTO: 29 PG (ref 26.8–34.3)
MCHC RBC AUTO-ENTMCNC: 31.2 G/DL (ref 31.4–37.4)
MCV RBC AUTO: 93 FL (ref 82–98)
MONOCYTES # BLD AUTO: 0.53 THOUSAND/ΜL (ref 0.17–1.22)
MONOCYTES NFR BLD AUTO: 8 % (ref 4–12)
NEUTROPHILS # BLD AUTO: 4.74 THOUSANDS/ΜL (ref 1.85–7.62)
NEUTS SEG NFR BLD AUTO: 67 % (ref 43–75)
NONHDLC SERPL-MCNC: 148 MG/DL
NRBC BLD AUTO-RTO: 0 /100 WBCS
PLATELET # BLD AUTO: 292 THOUSANDS/UL (ref 149–390)
PMV BLD AUTO: 8.7 FL (ref 8.9–12.7)
POTASSIUM SERPL-SCNC: 3.6 MMOL/L (ref 3.5–5.3)
PROT SERPL-MCNC: 6.9 G/DL (ref 6.4–8.4)
RBC # BLD AUTO: 4.49 MILLION/UL (ref 3.81–5.12)
SODIUM SERPL-SCNC: 140 MMOL/L (ref 135–147)
TRIGL SERPL-MCNC: 138 MG/DL (ref ?–150)
TSH SERPL DL<=0.05 MIU/L-ACNC: 1.69 UIU/ML (ref 0.45–4.5)
WBC # BLD AUTO: 7.08 THOUSAND/UL (ref 4.31–10.16)

## 2024-12-03 PROCEDURE — 85025 COMPLETE CBC W/AUTO DIFF WBC: CPT

## 2024-12-03 PROCEDURE — 80061 LIPID PANEL: CPT

## 2024-12-03 PROCEDURE — 86038 ANTINUCLEAR ANTIBODIES: CPT

## 2024-12-03 PROCEDURE — 36415 COLL VENOUS BLD VENIPUNCTURE: CPT

## 2024-12-03 PROCEDURE — 84443 ASSAY THYROID STIM HORMONE: CPT

## 2024-12-03 PROCEDURE — 86618 LYME DISEASE ANTIBODY: CPT

## 2024-12-03 PROCEDURE — 86140 C-REACTIVE PROTEIN: CPT

## 2024-12-03 PROCEDURE — 86430 RHEUMATOID FACTOR TEST QUAL: CPT

## 2024-12-03 PROCEDURE — 80053 COMPREHEN METABOLIC PANEL: CPT

## 2024-12-03 NOTE — TELEPHONE ENCOUNTER
This patient is wondering if she could also get checked for lymes along with her other bloodwork, she states that she would like to check that off the list as well as well and also ease her nerves. Please advise

## 2024-12-04 LAB
ANA SER QL IA: NEGATIVE
B BURGDOR IGG+IGM SER QL IA: NEGATIVE
RHEUMATOID FACT SER QL LA: NEGATIVE

## 2024-12-04 PROCEDURE — 88305 TISSUE EXAM BY PATHOLOGIST: CPT | Performed by: PATHOLOGY

## 2024-12-05 ENCOUNTER — TELEPHONE (OUTPATIENT)
Age: 64
End: 2024-12-05

## 2024-12-05 ENCOUNTER — RESULTS FOLLOW-UP (OUTPATIENT)
Dept: FAMILY MEDICINE CLINIC | Facility: CLINIC | Age: 64
End: 2024-12-05

## 2024-12-05 NOTE — TELEPHONE ENCOUNTER
Pt called to f/u on recent lab results.  Informed pt that PCP had not yet reviewed results.  Pt asking for PCP to review and advise on best next steps.  Pt states she already has Ortho referral but didn't know if there was anything else recommended by PCP.  Pt states she's in a lot of pain and having trouble walking.  Please review and advise how pt should proceed. OK to leave detailed VM.

## 2024-12-05 NOTE — TELEPHONE ENCOUNTER
Please let patient know her bloodwork is stable/good. Her inflammatory marker (CRP) is slightly elevated- this can be discussed with the ortho doctor to determine if they think it is related to osteoarthritis- if ortho wants her to see Rheumatology they will recommend this- her lyme,rheumatoid factor, and JOSE J are negative

## 2024-12-05 NOTE — TELEPHONE ENCOUNTER
I called and lmom for patient to please call the office back.     Also a result note in results for pt as well

## 2024-12-06 ENCOUNTER — APPOINTMENT (OUTPATIENT)
Dept: RADIOLOGY | Facility: CLINIC | Age: 64
End: 2024-12-06
Payer: COMMERCIAL

## 2024-12-06 ENCOUNTER — OFFICE VISIT (OUTPATIENT)
Dept: OBGYN CLINIC | Facility: CLINIC | Age: 64
End: 2024-12-06
Payer: COMMERCIAL

## 2024-12-06 VITALS
HEIGHT: 63 IN | SYSTOLIC BLOOD PRESSURE: 131 MMHG | TEMPERATURE: 100.2 F | DIASTOLIC BLOOD PRESSURE: 82 MMHG | HEART RATE: 118 BPM | OXYGEN SATURATION: 98 % | WEIGHT: 166 LBS | BODY MASS INDEX: 29.41 KG/M2

## 2024-12-06 DIAGNOSIS — M25.551 PAIN IN RIGHT HIP: ICD-10-CM

## 2024-12-06 DIAGNOSIS — M16.11 PRIMARY OSTEOARTHRITIS OF ONE HIP, RIGHT: Primary | ICD-10-CM

## 2024-12-06 PROCEDURE — 99204 OFFICE O/P NEW MOD 45 MIN: CPT | Performed by: FAMILY MEDICINE

## 2024-12-06 PROCEDURE — 73502 X-RAY EXAM HIP UNI 2-3 VIEWS: CPT

## 2024-12-06 RX ORDER — MELOXICAM 7.5 MG/1
7.5 TABLET ORAL DAILY PRN
Qty: 30 TABLET | Refills: 1 | Status: SHIPPED | OUTPATIENT
Start: 2024-12-06

## 2024-12-06 RX ORDER — ASCORBIC ACID 500 MG
500 TABLET ORAL DAILY
COMMUNITY

## 2024-12-06 NOTE — PROGRESS NOTES
[facility] [address] [id number] [phone]      Assessment:  1. Primary osteoarthritis of one hip, right  -     meloxicam (Mobic) 7.5 mg tablet; Take 1 tablet (7.5 mg total) by mouth daily as needed for moderate pain  -     Ambulatory Referral to Physical Therapy; Future  2. Pain in right hip  -     Ambulatory Referral to Orthopedic Surgery  -     XR hip/pelv 2-3 vws right if performed; Future; Expected date: 12/06/2024  -     meloxicam (Mobic) 7.5 mg tablet; Take 1 tablet (7.5 mg total) by mouth daily as needed for moderate pain  -     Ambulatory Referral to Physical Therapy; Future      Plan:  Patient Instructions   F/u for US guided R hip joint steroid injection  Icing/heat/OTC pain meds as needed.  Physical therapy  Icing/heat/OTC pain meds as needed.  meloxicam   Return in about 1 week (around 12/13/2024) for f/u for US guided R hip joint steroid injection, Recheck.    Chief Complaint:  Chief Complaint   Patient presents with    Right Hip - Pain       Subjective:   HPI    Patient ID: Angelina Yanes is a 64 y.o. female     Here c/o R groin pain  Pain walking.  Denies falling/trauma  Pain for about 4 -6 wks  Dull pain  Nothing improves pain  Worse with walking/getting up/down  Ibuprofen not helping.  Also having lower back pain -spasms  Denies changes in bowel/bladder/hx of CA    Review of Systems   Constitutional:  Negative for fatigue and fever.   Respiratory:  Negative for shortness of breath.    Cardiovascular:  Negative for chest pain.   Gastrointestinal:  Negative for abdominal pain and nausea.   Genitourinary:  Negative for dysuria.   Musculoskeletal:  Positive for arthralgias.   Skin:  Negative for rash and wound.   Neurological:  Negative for weakness and headaches.       Objective:  Vitals:  /82 (BP Location: Left arm, Patient Position: Sitting, Cuff Size: Standard)   Pulse (!) 118   Temp 100.2 °F (37.9  "°C) (Tympanic)   Ht 5' 3\" (1.6 m)   Wt 75.3 kg (166 lb)   SpO2 98%   BMI 29.41 kg/m²     The following portions of the patient's history were reviewed and updated as appropriate: allergies, current medications, past family history, past medical history, past social history, past surgical history, and problem list.    Physical exam:  Physical Exam  Constitutional:       Appearance: Normal appearance. She is normal weight.   HENT:      Head: Normocephalic.   Eyes:      Extraocular Movements: Extraocular movements intact.   Pulmonary:      Effort: Pulmonary effort is normal.   Musculoskeletal:      Cervical back: Normal range of motion.   Skin:     General: Skin is warm and dry.   Neurological:      General: No focal deficit present.      Mental Status: She is alert and oriented to person, place, and time. Mental status is at baseline.   Psychiatric:         Mood and Affect: Mood normal.         Behavior: Behavior normal.         Thought Content: Thought content normal.         Judgment: Judgment normal.       Right Hip Exam     Tenderness   The patient is experiencing tenderness in the anterior.    Range of Motion   The patient has normal right hip ROM.    Muscle Strength   The patient has normal right hip strength.  Flexion: 3/5     Tests   MICHELET: positive    Comments:  Pain with ROM      Back Exam     Tenderness   The patient is experiencing no tenderness.     Range of Motion   The patient has normal back ROM.    Muscle Strength   The patient has normal back strength.    Reflexes   Patellar:  normal    Comments:  Pain with flex            I have personally reviewed pertinent films in PACS and my interpretation is XR  R hip- mild/mod OA. No fx.      Cyrus Stout MD   "

## 2024-12-06 NOTE — PATIENT INSTRUCTIONS
F/u for US guided R hip joint steroid injection  Icing/heat/OTC pain meds as needed.  Physical therapy  Icing/heat/OTC pain meds as needed.  meloxicam

## 2024-12-09 ENCOUNTER — TELEPHONE (OUTPATIENT)
Dept: OBGYN CLINIC | Facility: MEDICAL CENTER | Age: 64
End: 2024-12-09

## 2024-12-09 NOTE — TELEPHONE ENCOUNTER
Caller: Patient    Doctor: Girish    Reason for call: Patient called to schedule USGI appointment, please assist scheduling.    Call back#: [phone]

## 2024-12-09 NOTE — TELEPHONE ENCOUNTER
Caller: patient    Doctor: Girish    Reason for call: Per note from last visit:   Return in about 1 week (around 12/13/2024) for f/u for US guided R hip joint steroid injection    Patient calling to schedule this appointment please. Call after 230 please.    Call back#: [phone]

## 2024-12-10 NOTE — TELEPHONE ENCOUNTER
Caller: Patient    Doctor: Dr. Stout     Reason for call: Patient f/u on request to schedule USGI for her Right hip.  She can be reached at the number below after 2:30PM.    Call back#: [phone]

## 2024-12-11 ENCOUNTER — OFFICE VISIT (OUTPATIENT)
Dept: OBGYN CLINIC | Facility: CLINIC | Age: 64
End: 2024-12-11
Payer: COMMERCIAL

## 2024-12-11 VITALS
TEMPERATURE: 99.1 F | HEART RATE: 99 BPM | WEIGHT: 168.2 LBS | SYSTOLIC BLOOD PRESSURE: 143 MMHG | DIASTOLIC BLOOD PRESSURE: 90 MMHG | HEIGHT: 63 IN | BODY MASS INDEX: 29.8 KG/M2

## 2024-12-11 DIAGNOSIS — M16.11 PRIMARY OSTEOARTHRITIS OF ONE HIP, RIGHT: Primary | ICD-10-CM

## 2024-12-11 PROCEDURE — 20611 DRAIN/INJ JOINT/BURSA W/US: CPT | Performed by: FAMILY MEDICINE

## 2024-12-11 RX ORDER — TRIAMCINOLONE ACETONIDE 40 MG/ML
40 INJECTION, SUSPENSION INTRA-ARTICULAR; INTRAMUSCULAR
Status: COMPLETED | OUTPATIENT
Start: 2024-12-11 | End: 2024-12-11

## 2024-12-11 RX ORDER — ROPIVACAINE HYDROCHLORIDE 5 MG/ML
3 INJECTION, SOLUTION EPIDURAL; INFILTRATION; PERINEURAL
Status: COMPLETED | OUTPATIENT
Start: 2024-12-11 | End: 2024-12-11

## 2024-12-11 RX ADMIN — ROPIVACAINE HYDROCHLORIDE 3 ML: 5 INJECTION, SOLUTION EPIDURAL; INFILTRATION; PERINEURAL at 15:30

## 2024-12-11 RX ADMIN — TRIAMCINOLONE ACETONIDE 40 MG: 40 INJECTION, SUSPENSION INTRA-ARTICULAR; INTRAMUSCULAR at 15:30

## 2024-12-11 NOTE — PROGRESS NOTES
"[facility] [address] [place]  [place] [address] [phone]      Assessment:  1. Primary osteoarthritis of one hip, right  -     Large joint arthrocentesis: R hip joint      Plan:  Patient Instructions   F/u 6 wks  US guided R hip joint steroid injection  Icing/heat/OTC pain meds as needed.  Begin therapy.     Return in about 6 weeks (around 1/22/2025) for Recheck.    Chief Complaint:  Chief Complaint   Patient presents with    Right Hip - Follow-up       Subjective:   HPI    Patient ID: Angelina Yanes is a 64 y.o. female     Here for US guided R hip joint injection    Review of Systems   Constitutional:  Negative for fatigue and fever.   Respiratory:  Negative for shortness of breath.    Cardiovascular:  Negative for chest pain.   Gastrointestinal:  Negative for abdominal pain and nausea.   Genitourinary:  Negative for dysuria.   Musculoskeletal:  Positive for arthralgias.   Skin:  Negative for rash and wound.   Neurological:  Negative for weakness and headaches.       Objective:  Vitals:  /90 (BP Location: Left arm, Patient Position: Sitting, Cuff Size: Large)   Pulse 99   Temp 99.1 °F (37.3 °C) (Tympanic)   Ht 5' 3\" (1.6 m)   Wt 76.3 kg (168 lb 3.2 oz)   BMI 29.80 kg/m²     The following portions of the patient's history were reviewed and updated as appropriate: allergies, current medications, past family history, past medical history, past social history, past surgical history, and problem list.    Physical exam:  Physical Exam  Constitutional:       Appearance: Normal appearance. She is normal weight.   HENT:      Head: Normocephalic.   Eyes:      Extraocular Movements: Extraocular movements intact.   Pulmonary:      Effort: Pulmonary effort is normal.   Musculoskeletal:      Cervical back: Normal range of motion.   Skin:     General: Skin is warm and dry.   Neurological:      General: No focal deficit present.      Mental Status: She is " "alert and oriented to person, place, and time. Mental status is at baseline.   Psychiatric:         Mood and Affect: Mood normal.         Behavior: Behavior normal.         Thought Content: Thought content normal.         Judgment: Judgment normal.       Ortho Exam    Large joint arthrocentesis: R hip joint  Universal Protocol:  procedure performed by consultantConsent: Verbal consent obtained.  Risks and benefits: risks, benefits and alternatives were discussed  Consent given by: patient  Time out: Immediately prior to procedure a \"time out\" was called to verify the correct patient, procedure, equipment, support staff and site/side marked as required.  Timeout called at: 12/11/2024 3:37 PM.  Supporting Documentation  Indications: pain   Procedure Details  Location: hip - R hip joint  Needle size: 22 G  Ultrasound guidance: yes  Approach: anterior  Medications administered: 3 mL ropivacaine 0.5 %; 40 mg triamcinolone acetonide 40 mg/mL    Patient tolerance: patient tolerated the procedure well with no immediate complications  Dressing:  Sterile dressing applied              Cyrus Stout MD   "

## 2024-12-11 NOTE — PATIENT INSTRUCTIONS
F/u 6 wks  US guided R hip joint steroid injection  Icing/heat/OTC pain meds as needed.  Begin therapy.

## 2024-12-13 NOTE — PROGRESS NOTES
Name: Angelina Yanes      : 1960      MRN: [id number]  Encounter Provider: Cm Thapa PA-C  Encounter Date: 2024   Encounter department: [facility] [place]  :  Assessment & Plan  Seborrheic keratosis  Doing well after excision of lesion from left chest.  Pathology reveals benign seborrheic keratosis.  Well-healed on exam sutures removed and scar care reviewed as well.  Has 2 additional seborrheic keratosis of the right shoulder after 1 cryotherapy treatment they remain raised for which additional cryotherapy treatment advised today.  Each lesion treated with 25-30 seconds of cryotherapy in short bursts.  Advised repeat treatment in about 2 weeks if the lesion persists.  Questions answered, agreeable to the plan.             History of Present Illness   HPI  Angelina Yanes is a 64 y.o. female who present for left chest  suture removal/possible cryo. Pt states the incision is healed and denies drainage, pain or fevers/chills. Jose Luis.O,MA  History obtained from: patient    Review of Systems   All other systems reviewed and are negative.    Past Medical History   Past Medical History:   Diagnosis Date    Disease of thyroid gland     Genital herpes     Hypertension      Past Surgical History:   Procedure Laterality Date    APPENDECTOMY      CARPAL TUNNEL RELEASE      COLONOSCOPY      CYSTOSCOPY      TONSILLECTOMY       Family History   Problem Relation Age of Onset    Heart disease Mother     Diabetes Mother     Heart disease Father     No Known Problems Sister     No Known Problems Maternal Grandmother     No Known Problems Maternal Grandfather     No Known Problems Paternal Grandmother     No Known Problems Paternal Grandfather     No Known Problems Maternal Aunt     No Known Problems Maternal Aunt     Breast cancer Other       reports that she quit smoking about 36 years ago. Her smoking use included cigarettes. She has never used smokeless tobacco. She reports  that she does not drink alcohol and does not use drugs.  Current Outpatient Medications on File Prior to Visit   Medication Sig Dispense Refill    ascorbic acid (VITAMIN C) 500 MG tablet Take 500 mg by mouth daily      calcium citrate-vitamin D 315 mg-5 mcg tablet Take 1 tablet by mouth 2 (two) times a day      Garlic 400 MG TBEC Take by mouth      Krill Oil (Omega-3) 500 MG CAPS Take by mouth      levothyroxine 50 mcg tablet Take 1 tablet by mouth once daily 90 tablet 1    lisinopril (ZESTRIL) 10 mg tablet Take 1 tablet (10 mg total) by mouth daily 90 tablet 1    meloxicam (Mobic) 7.5 mg tablet Take 1 tablet (7.5 mg total) by mouth daily as needed for moderate pain 30 tablet 1    Misc Natural Products (NEURIVA PO) Take by mouth      ofloxacin (OCUFLOX) 0.3 % ophthalmic solution Apply 2 drops in the left eye every 2 hours while awake for 2 days, THEN 2 drops in the left eye 4 times daily for 5 days 5 mL 0    Restasis 0.05 % ophthalmic emulsion INSTILL 1 DROP INTO EACH EYE EVERY 12 HOURS      Turmeric (QC TUMERIC COMPLEX PO) Take by mouth      acetaminophen (TYLENOL) 325 mg suppository Insert 650 mg into the rectum every 4 (four) hours as needed for mild pain (Patient not taking: Reported on 12/16/2024)      cyclobenzaprine (FLEXERIL) 10 mg tablet Take 1 tablet (10 mg total) by mouth 3 (three) times a day as needed for muscle spasms (Patient not taking: Reported on 4/1/2024) 30 tablet 2     No current facility-administered medications on file prior to visit.   No Known Allergies      Objective   Temp 99.9 °F (37.7 °C) (Temporal)      Physical Exam  Vitals and nursing note reviewed.   Constitutional:       General: She is not in acute distress.     Appearance: She is well-developed. She is not diaphoretic.   HENT:      Head: Normocephalic and atraumatic.   Eyes:      Conjunctiva/sclera: Conjunctivae normal.      Pupils: Pupils are equal, round, and reactive to light.   Pulmonary:      Effort: No respiratory distress.    Musculoskeletal:         General: Normal range of motion.      Cervical back: Normal range of motion.   Skin:     General: Skin is warm and dry.      Capillary Refill: Capillary refill takes less than 2 seconds.      Comments: Incision C/D/I left chest. Two seborrheic keratosis of right shoulder slightly raised/inflamed.   Neurological:      Mental Status: She is alert and oriented to person, place, and time.   Psychiatric:         Behavior: Behavior normal.       Lesion Destruction    Date/Time: 12/16/2024 2:30 PM    Performed by: Cm Thapa PA-C  Authorized by: Cm Thapa PA-C  [place] Protocol:  procedure performed by consultantConsent: Verbal consent obtained.  Risks and benefits: risks, benefits and alternatives were discussed  Consent given by: patient  Patient understanding: patient states understanding of the procedure being performed  Patient identity confirmed: verbally with patient    Procedure Details - Lesion Destruction:     Number of Lesions:  2  Lesion 1:     Body area:  Upper extremity    Upper extremity location:  R shoulder    Initial size (mm):  6    Malignancy: benign hyperkeratotic lesion      Destruction method: cryotherapy    Lesion 2:     Body area:  Upper extremity    Upper extremity location:  R shoulder    Initial size (mm):  6    Malignancy: benign hyperkeratotic lesion      Destruction method: cryotherapy       Short bursts totaling 25-30 seconds. Tolerated well. X 2.

## 2024-12-16 ENCOUNTER — OFFICE VISIT (OUTPATIENT)
Dept: SURGERY | Facility: CLINIC | Age: 64
End: 2024-12-16

## 2024-12-16 VITALS — TEMPERATURE: 99.9 F

## 2024-12-16 DIAGNOSIS — L82.1 SEBORRHEIC KERATOSIS: Primary | ICD-10-CM

## 2024-12-16 PROCEDURE — 99024 POSTOP FOLLOW-UP VISIT: CPT | Performed by: PHYSICIAN ASSISTANT

## 2024-12-16 NOTE — ASSESSMENT & PLAN NOTE
Doing well after excision of lesion from left chest.  Pathology reveals benign seborrheic keratosis.  Well-healed on exam sutures removed and scar care reviewed as well.  Has 2 additional seborrheic keratosis of the right shoulder after 1 cryotherapy treatment they remain raised for which additional cryotherapy treatment advised today.  Each lesion treated with 25-30 seconds of cryotherapy in short bursts.  Advised repeat treatment in about 2 weeks if the lesion persists.  Questions answered, agreeable to the plan.

## 2024-12-31 ENCOUNTER — EVALUATION (OUTPATIENT)
Dept: PHYSICAL THERAPY | Facility: CLINIC | Age: 64
End: 2024-12-31
Payer: COMMERCIAL

## 2024-12-31 DIAGNOSIS — M25.551 PAIN IN RIGHT HIP: Primary | ICD-10-CM

## 2024-12-31 DIAGNOSIS — M16.11 PRIMARY OSTEOARTHRITIS OF ONE HIP, RIGHT: ICD-10-CM

## 2024-12-31 PROCEDURE — 97162 PT EVAL MOD COMPLEX 30 MIN: CPT | Performed by: PHYSICAL THERAPIST

## 2024-12-31 PROCEDURE — 97110 THERAPEUTIC EXERCISES: CPT | Performed by: PHYSICAL THERAPIST

## 2024-12-31 NOTE — PROGRESS NOTES
PT Evaluation     Today's date: 2024  Patient name: Angelina Yanes  : 1960  MRN: [id number]  Referring provider: Cyrus Stout MD  Dx:   Encounter Diagnosis     ICD-10-CM    1. Pain in right hip  M25.551 Ambulatory Referral to Physical Therapy      2. Primary osteoarthritis of one hip, right  M16.11 Ambulatory Referral to Physical Therapy          Start Time: 1145  Stop Time: 1230  Total time in clinic (min): 45 minutes    Assessment  Impairments: abnormal gait, abnormal muscle firing, abnormal muscle tone, abnormal or restricted ROM, abnormal movement, activity intolerance, impaired balance, impaired physical strength, lacks appropriate home exercise program, pain with function and safety issue  Functional limitations: walking, stairs, squatting, lunging, lifting, kneeling    Assessment details: Angelina Yanes was seen for an initial PT evaluation today. Patient is a 64 y.o. female with diagnosis of right hip OA and past medical history significant for  appendectomy, HTN, hypothyroidism. Moderate complexity evaluation  due to number of participation restrictions, functional outcome measure of 66% limitation, and evolving clinical presentation. Findings today show limitation of left hip range of motion, weakness of L LE with instability of gait, decreased lumbar range of motion and reduced lumbar mobility with pain  impacting overall functional mobility including ability to walk, stand, squat. Will attempt extension bias exercises to start as well as working on hip extension strength and core stability. Skilled PT indicated to treat at this time to address above stated deficits and return patient to PLOF.       Goals  STG (6 weeks)  1. Patient will have reported 0/10 pain in RLE at rest.   2. Improve patient's right hip extension to 0 degrees for increased ability to take proper strides during ambulation.  3. Increase patient's R single leg balance to 5 seconds for increased stability on  stairs.   LTG (12 weeks)  1. Patient's LE strength will be equal bilaterally for ability to ambulate and return to functional activities at OF.   2. Patient will be able to walk her dog with 0/10 pain in right LE.   3. Patient will be independent with home exercise program for continued maintenance post PT discharge.       Plan  Patient would benefit from: skilled physical therapy  Planned modality interventions: unattended electrical stimulation, thermotherapy: hydrocollator packs and cryotherapy    Planned therapy interventions: manual therapy, neuromuscular re-education, self care, home exercise program, gait training, therapeutic exercise and therapeutic activities    Frequency: 2x week  Duration in weeks: 12  Plan of Care beginning date: 12/31/2024  Plan of Care expiration date: 3/31/2025  Treatment plan discussed with: patient and PTA  Plan details: Progress note in 4 weeks.        Subjective Evaluation    History of Present Illness  Mechanism of injury: Angelina Yanes is a 64 y.o. female who presents to outpatient Physical Therapy today with complaints of right hip pain. History of L hip pain that has been feeling better. Newer onset R hip pain that started in the Fall and by Thanksgiving was having significant issues with RLE and difficulty lifting leg. Seen by ortho who recently administered injection with minimal improvement. Also being seen by DCM, has had 2 sessions.     Tested for RA and lymes (-)  Narrative & Impression      X-ray FINDINGS:  There is no acute fracture or dislocation.  No significant hip degenerative changes.  No lytic or blastic osseous lesion.  Soft tissues are unremarkable.  Degenerative changes visualized lower lumbar spine.  IMPRESSION:  No acute osseous abnormality of the right hip.  No significant change from 3/26/2024.        Patient Goals  Patient goals for therapy: decreased pain  Patient goal: be able to do daily chores pain free, walk her dog  Pain  Current pain  ratin  At best pain ratin  At worst pain ratin  Location: anteiror R thigh, occasional in lateral hip, occaionally in groin  Relieving factors: medications  Exacerbated by: unsure.    Social Support  Steps to enter house: yes  5  Stairs in house: yes   Lives in: multiple-level home    Employment status: working ()    Diagnostic Tests  X-ray: normal        Objective     Tenderness     Lumbar Spine  Tenderness in the left transverse process and right transverse process.     Left Hip   No tenderness in the ASIS and PSIS.     Right Hip   No tenderness in the ASIS and PSIS.     Neurological Testing     Sensation     Lumbar   Left   Intact: light touch    Right   Intact: light touch    Comments   Right light touch: lateral low leg tenderness    Active Range of Motion     Lumbar   Flexion:  WFL  Extension:  Restriction level: maximal  Left lateral flexion:  Restriction level: minimal  Right lateral flexion:  Restriction level: minimal  Left Hip   Flexion: 95 degrees   Extension: -5 degrees   Abduction: 15 degrees with pain    Right Hip   Flexion: 95 degrees with pain  Extension: -5 degrees   Abduction: 15 degrees     Passive Range of Motion   Left Hip   Extension: 10 degrees     Right Hip   Extension: 10 degrees     Joint Play     Hypomobile: L3, L4, L5 and S1   Mechanical Assessment    Cervical      Thoracic      Lumbar    Standing flexion: repeated movements   Pain location:no change  Standing extension: repeated movements  Pain location: no change    Strength/Myotome Testing     Left Hip   Planes of Motion   Flexion: 4  Extension: 2  Adduction: 5  External rotation: 3-    Right Hip   Planes of Motion   Flexion: 4-  Extension: 2  Adduction: 5  External rotation: 3-    Left Knee   Flexion: 4+  Extension: 4+    Right Knee   Flexion: 4+  Extension: 4+    Left Ankle/Foot   Dorsiflexion: 5    Right Ankle/Foot   Dorsiflexion: 4+    Tests     Left Hip   Negative Ely's.     Right Hip   Negative  Ely's and scour.     Additional Tests Details  TUG: 15sec no AD      General Comments:      Hip Comments   Gait: shortened step length with decreased stance on RLE. Shuffle style gait    FOTO: 34% function, 57% predicted function                  Precautions: gait abnormality  Access code: [email or web address]  Progress note: 1/31  POC: 3/31    Manuals 12/31       PROM *       Lumbar PA mobs KB                       Neuro Re-Ed        Core brace *       QS        Glut set *               SLB        tandem *       Fitter board        Ther Ex        Nustep s= *with towel       Heel slide flex, abd        SLR        SAQ        Bridge        LAQ        Ball squeeze *       Clamshell *supine       Leg press   Squat  HR                Standing hip flex, ext, abd *       Standing HR/TR                Prone lay 5 min       Standing lumbar extension 10x               Ther Activity        Step ups        Total gym         Sit to stand        Gait Training        Mirror gait with vc                Modalities        CP

## 2025-01-05 DIAGNOSIS — M16.11 PRIMARY OSTEOARTHRITIS OF ONE HIP, RIGHT: ICD-10-CM

## 2025-01-05 DIAGNOSIS — M25.551 PAIN IN RIGHT HIP: ICD-10-CM

## 2025-01-07 ENCOUNTER — OFFICE VISIT (OUTPATIENT)
Dept: PHYSICAL THERAPY | Facility: CLINIC | Age: 65
End: 2025-01-07
Payer: COMMERCIAL

## 2025-01-07 DIAGNOSIS — M25.551 PAIN IN RIGHT HIP: Primary | ICD-10-CM

## 2025-01-07 DIAGNOSIS — M16.11 PRIMARY OSTEOARTHRITIS OF ONE HIP, RIGHT: ICD-10-CM

## 2025-01-07 PROCEDURE — 97140 MANUAL THERAPY 1/> REGIONS: CPT | Performed by: PHYSICAL THERAPIST

## 2025-01-07 PROCEDURE — 97110 THERAPEUTIC EXERCISES: CPT | Performed by: PHYSICAL THERAPIST

## 2025-01-07 PROCEDURE — 97112 NEUROMUSCULAR REEDUCATION: CPT | Performed by: PHYSICAL THERAPIST

## 2025-01-07 RX ORDER — MELOXICAM 7.5 MG/1
TABLET ORAL
Qty: 90 TABLET | Refills: 1 | Status: SHIPPED | OUTPATIENT
Start: 2025-01-07

## 2025-01-07 NOTE — PROGRESS NOTES
"Daily Note     Today's date: 2025  Patient name: Angelina Yanes  : 1960  MRN: [id number]  Referring provider: Cyrus Stout MD  Dx:   Encounter Diagnosis     ICD-10-CM    1. Pain in right hip  M25.551       2. Primary osteoarthritis of one hip, right  M16.11           Start Time: 1445  Stop Time: 1530  Total time in clinic (min): 45 minutes    Subjective: States continued pain in legs with fatigue. Has been attempting HEP, but \"could be more consistent\". Enjoys the prone lay exercise.      Objective: See treatment diary below      Assessment: Tolerated treatment well. Continued shuffle type gait, improved post extension bias exercises. Increased exercise intensity today as noted in flow sheet with HEP updated and given to patient. Patient exhibited good technique with therapeutic exercises and would benefit from continued PT      Plan: Continue per plan of care.  Progress treatment as tolerated.       Precautions: gait abnormality  Access code: [email or web address]  Progress note:   POC: 3/31    Manuals       PROM *       Lumbar PA mobs KB Grade II-III                      Neuro Re-Ed        Core brace * :05x10      QS        Glut set * :05x10              SLB        tandem * :30x2 bilat      Fitter board        Ther Ex        Nustep s= *with towel L1 10 min with arms      Heel slide flex, abd        SLR        SAQ        Bridge        LAQ        Ball squeeze * :05x10      Clamshell *supine Green 10x supine      Leg press   Squat  HR                Standing hip ext, abd * 10x ea bilat      Standing HR/TR        Prone with bilateral knee ext together  nv      Prone lay 5 min Progressive 10 min      Standing lumbar extension 10x 10x              Ther Activity        Step ups        Total gym         Sit to stand        Gait Training        Mirror gait with vc                Modalities        CP                         "

## 2025-01-14 ENCOUNTER — OFFICE VISIT (OUTPATIENT)
Dept: PHYSICAL THERAPY | Facility: CLINIC | Age: 65
End: 2025-01-14
Payer: COMMERCIAL

## 2025-01-14 DIAGNOSIS — M16.11 PRIMARY OSTEOARTHRITIS OF ONE HIP, RIGHT: ICD-10-CM

## 2025-01-14 DIAGNOSIS — M25.551 PAIN IN RIGHT HIP: Primary | ICD-10-CM

## 2025-01-14 PROCEDURE — 97112 NEUROMUSCULAR REEDUCATION: CPT | Performed by: PHYSICAL THERAPIST

## 2025-01-14 PROCEDURE — 97110 THERAPEUTIC EXERCISES: CPT | Performed by: PHYSICAL THERAPIST

## 2025-01-14 NOTE — PROGRESS NOTES
Daily Note     Today's date: 2025  Patient name: Angelina Yanes  : 1960  MRN: [id number]  Referring provider: Cyrus Stout MD  Dx:   Encounter Diagnosis     ICD-10-CM    1. Pain in right hip  M25.551       2. Primary osteoarthritis of one hip, right  M16.11           Start Time: 1445  Stop Time: 1530  Total time in clinic (min): 45 minutes    Subjective: States she is feeling very tired today. Pain level is not to bad, but doesn't have much energy. Has attempted to change diet to anti-inflammatory       Objective: See treatment diary below      Assessment: Tolerated treatment well. Required verbal cueing for proper execution of TrA activation. Stretch felt during prone exercises in anterior LE indicated continued quad tightness. Patient would benefit from continued PT working on extension bias, pelvic strength and stability.       Plan: Continue per plan of care.  Progress treatment as tolerated.       Precautions: gait abnormality  Access code: [email or web address]  Progress note:   POC: 3/31    Manuals      PROM *       Lumbar PA mobs KB Grade II-III nv                     Neuro Re-Ed        Core brace * :05x10 :05x10     QS        Glut set * :05x10              SLB        tandem * :30x2 bilat :30x2 bilat     Fitter board        Ther Ex        Nustep s= *with towel L1 10 min with arms L1 12 min      Heel slide flex, abd        SLR        SAQ        Bridge        LAQ        Ball squeeze * :05x10 :05x10     Clamshell *supine Green 10x supine Green 10x supine     Leg press   Squat  HR                Standing hip ext, abd * 10x ea bilat 10x ea     Standing HR/TR        Prone with bilateral knee ext together  nv      Prone lay 5 min Progressive 10 min      Standing lumbar extension 10x 10x 10x             Ther Activity        Step ups        Total gym         Sit to stand        Gait Training        Mirror gait with vc                Modalities        CP

## 2025-01-21 ENCOUNTER — OFFICE VISIT (OUTPATIENT)
Dept: PHYSICAL THERAPY | Facility: CLINIC | Age: 65
End: 2025-01-21
Payer: COMMERCIAL

## 2025-01-21 DIAGNOSIS — M25.551 PAIN IN RIGHT HIP: Primary | ICD-10-CM

## 2025-01-21 DIAGNOSIS — M16.11 PRIMARY OSTEOARTHRITIS OF ONE HIP, RIGHT: ICD-10-CM

## 2025-01-21 PROCEDURE — 97110 THERAPEUTIC EXERCISES: CPT | Performed by: PHYSICAL THERAPIST

## 2025-01-21 PROCEDURE — 97112 NEUROMUSCULAR REEDUCATION: CPT | Performed by: PHYSICAL THERAPIST

## 2025-01-21 NOTE — PROGRESS NOTES
Daily Note     Today's date: 2025  Patient name: Angelina Yanes  : 1960  MRN: [id number]  Referring provider: Cyrus Stout MD  Dx:   Encounter Diagnosis     ICD-10-CM    1. Pain in right hip  M25.551       2. Primary osteoarthritis of one hip, right  M16.11           Start Time: 1445  Stop Time: 1530  Total time in clinic (min): 45 minutes    Subjective: States continued pain in right shin and left hip. Will occasionally feel like left leg will give out when walking.       Objective: See treatment diary below      Assessment: Tolerated treatment well. Continues today display shuffle type gait, improved post repetitive standing lumbar extension, worse after sitting. Patient exhibited good technique with therapeutic exercises and would benefit from continued PT. Felt better leaving the session today.       Plan: Continue per plan of care.  Progress treatment as tolerated.       Precautions: gait abnormality  Access code: [email or web address]  Progress note:   POC: 3/31    Manuals     PROM *       Lumbar PA mobs KB Grade II-III nv                     Neuro Re-Ed        Core brace * :05x10 :05x10 :05x10    QS        Glut set * :05x10              SLB        tandem * :30x2 bilat :30x2 bilat :30x2 bilat    Fitter board        Ther Ex        Nustep s= *with towel L1 10 min with arms L1 12 min  L1 10 min with arms    Heel slide flex, abd        SLR        SAQ        Bridge        LAQ        Ball squeeze * :05x10 :05x10 :05x10    Clamshell *supine Green 10x supine Green 10x supine Green 10x supine    Leg press   Squat  HR                Standing hip ext, abd * 10x ea bilat 10x ea 10x ea bilat    Standing HR/TR        Prone with bilateral knee ext together  nv      Prone lay 5 min Progressive 10 min  Progressive 10 min    Standing lumbar extension 10x 10x 10x 10x            Ther Activity        Step ups        Total gym         Sit to stand        Gait Training        Mirror gait with vc                 Modalities        CP

## 2025-01-23 ENCOUNTER — TELEPHONE (OUTPATIENT)
Dept: FAMILY MEDICINE CLINIC | Facility: CLINIC | Age: 65
End: 2025-01-23

## 2025-01-23 DIAGNOSIS — M79.605 BILATERAL LEG PAIN: Primary | ICD-10-CM

## 2025-01-23 DIAGNOSIS — M79.604 BILATERAL LEG PAIN: Primary | ICD-10-CM

## 2025-01-28 ENCOUNTER — APPOINTMENT (OUTPATIENT)
Dept: PHYSICAL THERAPY | Facility: CLINIC | Age: 65
End: 2025-01-28
Payer: COMMERCIAL

## 2025-02-13 ENCOUNTER — CONSULT (OUTPATIENT)
Age: 65
End: 2025-02-13
Payer: COMMERCIAL

## 2025-02-13 ENCOUNTER — TELEPHONE (OUTPATIENT)
Age: 65
End: 2025-02-13

## 2025-02-13 VITALS — HEIGHT: 63 IN | BODY MASS INDEX: 29.8 KG/M2

## 2025-02-13 DIAGNOSIS — M79.604 BILATERAL LEG PAIN: ICD-10-CM

## 2025-02-13 DIAGNOSIS — M51.16 LUMBAR DISC DISEASE WITH RADICULOPATHY: ICD-10-CM

## 2025-02-13 DIAGNOSIS — G89.4 CHRONIC PAIN SYNDROME: ICD-10-CM

## 2025-02-13 DIAGNOSIS — M79.605 BILATERAL LEG PAIN: ICD-10-CM

## 2025-02-13 DIAGNOSIS — R29.898 LEFT LEG WEAKNESS: Primary | ICD-10-CM

## 2025-02-13 PROCEDURE — 99204 OFFICE O/P NEW MOD 45 MIN: CPT | Performed by: ANESTHESIOLOGY

## 2025-02-13 RX ORDER — DULOXETIN HYDROCHLORIDE 30 MG/1
30 CAPSULE, DELAYED RELEASE ORAL DAILY
Qty: 30 CAPSULE | Refills: 1 | Status: SHIPPED | OUTPATIENT
Start: 2025-02-13 | End: 2025-03-15

## 2025-02-13 RX ORDER — METHYLPREDNISOLONE 4 MG/1
TABLET ORAL
Qty: 21 TABLET | Refills: 0 | Status: SHIPPED | OUTPATIENT
Start: 2025-02-13

## 2025-02-13 RX ORDER — MAGNESIUM 30 MG
250 TABLET ORAL 2 TIMES DAILY
COMMUNITY

## 2025-02-13 RX ORDER — MULTIVIT WITH MINERALS/LUTEIN
1000 TABLET ORAL DAILY
COMMUNITY

## 2025-02-13 NOTE — PROGRESS NOTES
Assessment:  1. Left leg weakness    2. Lumbar disc disease with radiculopathy    3. Chronic pain syndrome    4. Bilateral leg pain        Plan:  Patient is a 65-year-old female complains of low back pain and right leg pain and weakness with chronic patient secondary to lumbar radiculopathy presents to office for initial consultation.  X-ray thoracic spine shows moderate kyphosis with mild degenerative disc disease from T6-T10.  X-ray of lumbar spine shows moderate L4-L5 and L3-L4 degenerative disc disease with a grade 1 anterolisthesis of L4 on L5 and L5 on S1 with multilevel facet arthropathy from L4-S1.  Patient has been doing physical therapy for right hip pain however it is noted that patient continues to have pain in the left hip and the right shin.  Patient exhibits significant weakness in the right lower extremity and requires a cane for gait assistance.  1.  We will obtain an MRI of the lumbar spine to better assess the discogenic pathology and patient's left leg weakness  2.  We will trial patient on Cymbalta 30 mg p.o. nightly for lumbar radicular symptoms  3.  Follow-up in 1 month to review diagnostic imaging plan interventional management      The patient is a 65 y.o. female who presents for consultation in regards to Neck Pain, Back Pain, Groin Pain, Hip Pain, Knee Pain, and Leg Pain.  Symptoms have been present for 7 months. Symptoms began without any precipitating injury or trauma. Pain is reported to be 8 on the numeric rating scale.  Symptoms are felt nearly constantly and worst in the no typical pattern.  Symptoms are characterized as cramping, shooting, sharp, tingling, and pressure-like.  Symptoms are associated with left leg weakness.  Aggravating factors include standing, bending, leaning forward, leaning bckward, sitting, and coughing/sneezing.  Relieving factors include walking.  No change in symptoms with kneeling, lying down, exercise, turning the head, relaxation, and bowel movements.   Treatments that have been helpful include heat/ice. physical therapy, chiropractic manipulation, and traction have provided no relief.  Medications to relieve symptoms include none.    Review of Systems:    Review of Systems   Constitutional:  Positive for unexpected weight change. Negative for chills, fatigue and fever.   HENT:  Negative for hearing loss, sinus pain, sore throat and trouble swallowing.    Eyes:  Positive for redness. Negative for pain and visual disturbance.   Respiratory:  Positive for cough. Negative for shortness of breath and wheezing.    Cardiovascular:  Negative for chest pain and palpitations.   Gastrointestinal:  Negative for abdominal pain, constipation and nausea.   Endocrine: Positive for polyphagia and polyuria. Negative for polydipsia.   Genitourinary:  Negative for difficulty urinating.   Musculoskeletal:  Positive for myalgias. Negative for arthralgias, gait problem and joint swelling.   Skin:  Positive for rash.   Neurological:  Positive for dizziness. Negative for weakness and headaches.   Hematological:  Does not bruise/bleed easily.   Psychiatric/Behavioral:  Positive for decreased concentration. Negative for dysphoric mood. The patient is nervous/anxious.    All other systems reviewed and are negative.      Past Medical History:   Diagnosis Date    Disease of thyroid gland     Genital herpes     Hypertension        Past Surgical History:   Procedure Laterality Date    APPENDECTOMY      CARPAL TUNNEL RELEASE      COLONOSCOPY      CYSTOSCOPY      TONSILLECTOMY         Family History   Problem Relation Age of Onset    Heart disease Mother     Diabetes Mother     Heart disease Father     No Known Problems Sister     No Known Problems Maternal Grandmother     No Known Problems Maternal Grandfather     No Known Problems Paternal Grandmother     No Known Problems Paternal Grandfather     No Known Problems Maternal Aunt     No Known Problems Maternal Aunt     Breast cancer Other   "      Social History     Occupational History    Occupation:    Tobacco Use    Smoking status: Former     Current packs/day: 0.00     Types: Cigarettes     Quit date: 1988     Years since quittin.5    Smokeless tobacco: Never   Vaping Use    Vaping status: Never Used   Substance and Sexual Activity    Alcohol use: No    Drug use: No    Sexual activity: Not Currently         Current Outpatient Medications:     acetaminophen (TYLENOL) 325 mg suppository, Insert 650 mg into the rectum every 4 (four) hours as needed for mild pain (Patient not taking: Reported on 2025), Disp: , Rfl:     ascorbic acid (VITAMIN C) 500 MG tablet, Take 500 mg by mouth daily, Disp: , Rfl:     calcium citrate-vitamin D 315 mg-5 mcg tablet, Take 1 tablet by mouth 2 (two) times a day, Disp: , Rfl:     cyclobenzaprine (FLEXERIL) 10 mg tablet, Take 1 tablet (10 mg total) by mouth 3 (three) times a day as needed for muscle spasms (Patient not taking: Reported on 2024), Disp: 30 tablet, Rfl: 2    Garlic 400 MG TBEC, Take by mouth, Disp: , Rfl:     levothyroxine 50 mcg tablet, Take 1 tablet by mouth once daily, Disp: 90 tablet, Rfl: 1    lisinopril (ZESTRIL) 10 mg tablet, Take 1 tablet (10 mg total) by mouth daily, Disp: 90 tablet, Rfl: 1    meloxicam (MOBIC) 7.5 mg tablet, take 1 tablet by mouth daily if needed for moderate pain, Disp: 90 tablet, Rfl: 1    Misc Natural Products (NEURIVA PO), Take by mouth, Disp: , Rfl:     ofloxacin (OCUFLOX) 0.3 % ophthalmic solution, Apply 2 drops in the left eye every 2 hours while awake for 2 days, THEN 2 drops in the left eye 4 times daily for 5 days, Disp: 5 mL, Rfl: 0    Restasis 0.05 % ophthalmic emulsion, INSTILL 1 DROP INTO EACH EYE EVERY 12 HOURS, Disp: , Rfl:     No Known Allergies    Physical Exam:    Ht 5' 3\" (1.6 m)   BMI 29.80 kg/m²     Constitutional: normal, well developed, well nourished, alert, in no distress and non-toxic and no overt pain behavior.  Eyes: " anicteric  HEENT: grossly intact  Neck: supple, symmetric, trachea midline and no masses   Pulmonary:even and unlabored  Cardiovascular:No edema or pitting edema present  Skin:Normal without rashes or lesions and well hydrated  Psychiatric:Mood and affect appropriate  Neurologic:Cranial Nerves II-XII grossly intact  Musculoskeletal:antalgic    Lumbar/Sacral Spine examination demonstrates.  Decreased range of motion lumbar spine with pain upon: flexion, lateral rotation to the left/right, and bending to the left/right.  Bilateral lumbar paraspinals tender to palpation. Muscle spasms noted in the lumbar area bilaterally. 3/5 left lower extremity strength in all muscle groups bilaterally. Positive seated straight leg raise for bilateral lower extremities.  Sensitivity to light touch intact bilateral lower extremities. 2+ reflexes in the patella and Achilles.  No ankle clonus    Imaging  No orders to display       No orders of the defined types were placed in this encounter.

## 2025-02-13 NOTE — TELEPHONE ENCOUNTER
Caller: cristhian Alfaro    Doctor: Dr. Bowers    Reason for call: by called stating she has more right leg weakness then left leg.  She was just in the office today and isn't sure if it matters or not but she just wanted to clarify    Call back#: [phone]

## 2025-02-21 ENCOUNTER — HOSPITAL ENCOUNTER (OUTPATIENT)
Dept: MRI IMAGING | Facility: HOSPITAL | Age: 65
End: 2025-02-21
Payer: COMMERCIAL

## 2025-02-21 DIAGNOSIS — G89.4 CHRONIC PAIN SYNDROME: ICD-10-CM

## 2025-02-21 DIAGNOSIS — M51.16 LUMBAR DISC DISEASE WITH RADICULOPATHY: ICD-10-CM

## 2025-02-21 DIAGNOSIS — R29.898 LEFT LEG WEAKNESS: ICD-10-CM

## 2025-02-21 PROCEDURE — 72148 MRI LUMBAR SPINE W/O DYE: CPT

## 2025-03-05 ENCOUNTER — OFFICE VISIT (OUTPATIENT)
Dept: PAIN MEDICINE | Facility: CLINIC | Age: 65
End: 2025-03-05
Payer: COMMERCIAL

## 2025-03-05 VITALS
OXYGEN SATURATION: 98 % | TEMPERATURE: 98.5 F | RESPIRATION RATE: 16 BRPM | BODY MASS INDEX: 29.77 KG/M2 | WEIGHT: 168 LBS | HEART RATE: 81 BPM | HEIGHT: 63 IN

## 2025-03-05 DIAGNOSIS — R29.898 LEFT LEG WEAKNESS: ICD-10-CM

## 2025-03-05 DIAGNOSIS — M79.604 BILATERAL LEG PAIN: ICD-10-CM

## 2025-03-05 DIAGNOSIS — M79.605 BILATERAL LEG PAIN: ICD-10-CM

## 2025-03-05 DIAGNOSIS — M25.551 RIGHT HIP PAIN: Primary | ICD-10-CM

## 2025-03-05 DIAGNOSIS — G89.4 CHRONIC PAIN SYNDROME: ICD-10-CM

## 2025-03-05 DIAGNOSIS — M51.16 LUMBAR DISC DISEASE WITH RADICULOPATHY: ICD-10-CM

## 2025-03-05 PROCEDURE — 99214 OFFICE O/P EST MOD 30 MIN: CPT | Performed by: ANESTHESIOLOGY

## 2025-03-05 RX ORDER — DULOXETIN HYDROCHLORIDE 30 MG/1
30 CAPSULE, DELAYED RELEASE ORAL DAILY
Qty: 30 CAPSULE | Refills: 1 | Status: SHIPPED | OUTPATIENT
Start: 2025-03-05 | End: 2025-04-04

## 2025-03-05 NOTE — PROGRESS NOTES
Assessment:  1. Right hip pain    2. Left leg weakness    3. Lumbar disc disease with radiculopathy    4. Chronic pain syndrome    5. Bilateral leg pain        Plan:  Patient is a 65-year-old female with complaints of low back pain, left leg pain and left rhomboid pain with chronic pain some secondary lumbar degenerative disease, lumbar radiculopathy presents to office for follow-up visit.  At last office we did order an MRI of the lumbar spine which shows marked facet arthropathy with mild bilateral foraminal narrowing from L3-S1.  We did start patient on Cymbalta 30 mg p.o. nightly to which she does report some alleviation of symptoms.  Patient did report significant alleviation of symptoms when she was taking the Medrol Dosepak.      1.  We will provide patient with water therapy lumbar core strengthening exercises  2.  We will schedule patient for a L4-L5 interlaminar epidural steroid injection  3.  We will continue Cymbalta 30 mg p.o. nightly  4.  Follow-up 1 month after injection    Complete risks and benefits including bleeding, infection, tissue reaction, nerve injury and allergic reaction were discussed. The approach was demonstrated using models and literature was provided. Verbal and written consent was obtained.      History of Present Illness:  The patient is a 65 y.o. female who presents for a follow up office visit in regards to Back Pain.   The patient’s current symptoms include 8 out of 10 intermittent sharp, pins-and-needles 1 particular time pattern.    Current pain medications includes: Mobic, Cymbalta.  The patient reports that this regimen is providing 75% pain relief.  The patient is reporting no side effects from this pain medication regimen.    I have personally reviewed and/or updated the patient's past medical history, past surgical history, family history, social history, current medications, allergies, and vital signs today.         Review of Systems  Review of Systems  Patient was informed.      Gastrointestinal:  Positive for diarrhea.   Musculoskeletal:  Positive for back pain and gait problem.   Neurological:  Positive for dizziness.   All other systems reviewed and are negative.          Past Medical History:   Diagnosis Date    Disease of thyroid gland     Genital herpes     Hypertension        Past Surgical History:   Procedure Laterality Date    APPENDECTOMY      CARPAL TUNNEL RELEASE      COLONOSCOPY      CYSTOSCOPY      TONSILLECTOMY         Family History   Problem Relation Age of Onset    Heart disease Mother     Diabetes Mother     Heart disease Father     No Known Problems Sister     No Known Problems Maternal Grandmother     No Known Problems Maternal Grandfather     No Known Problems Paternal Grandmother     No Known Problems Paternal Grandfather     No Known Problems Maternal Aunt     No Known Problems Maternal Aunt     Breast cancer Other        Social History     Occupational History    Occupation: Art Teacher   Tobacco Use    Smoking status: Former     Current packs/day: 0.00     Types: Cigarettes     Quit date: 1988     Years since quittin.6    Smokeless tobacco: Never   Vaping Use    Vaping status: Never Used   Substance and Sexual Activity    Alcohol use: No    Drug use: No    Sexual activity: Not Currently         Current Outpatient Medications:     ascorbic acid (VITAMIN C) 500 MG tablet, Take 500 mg by mouth daily, Disp: , Rfl:     calcium citrate-vitamin D 315 mg-5 mcg tablet, Take 1 tablet by mouth 2 (two) times a day, Disp: , Rfl:     DULoxetine (CYMBALTA) 30 mg delayed release capsule, Take 1 capsule (30 mg total) by mouth daily, Disp: 30 capsule, Rfl: 1    Garlic 400 MG TBEC, Take by mouth, Disp: , Rfl:     levothyroxine 50 mcg tablet, Take 1 tablet by mouth once daily, Disp: 90 tablet, Rfl: 1    lisinopril (ZESTRIL) 10 mg tablet, Take 1 tablet (10 mg total) by mouth daily, Disp: 90 tablet, Rfl: 1    magnesium 30 MG tablet, Take 250 mg by mouth 2 (two) times a day,  "Disp: , Rfl:     meloxicam (MOBIC) 7.5 mg tablet, take 1 tablet by mouth daily if needed for moderate pain, Disp: 90 tablet, Rfl: 1    methylPREDNISolone 4 MG tablet therapy pack, Use as directed on package, Disp: 21 tablet, Rfl: 0    Misc Natural Products (NEURIVA PO), Take by mouth, Disp: , Rfl:     Multiple Vitamins-Minerals (CENTRUM SILVER WOMEN 50+ PO), , Disp: , Rfl:     ofloxacin (OCUFLOX) 0.3 % ophthalmic solution, Apply 2 drops in the left eye every 2 hours while awake for 2 days, THEN 2 drops in the left eye 4 times daily for 5 days, Disp: 5 mL, Rfl: 0    Restasis 0.05 % ophthalmic emulsion, INSTILL 1 DROP INTO EACH EYE EVERY 12 HOURS, Disp: , Rfl:     vitamin E 100 UNIT capsule, Take 100 Units by mouth daily, Disp: , Rfl:     vitamin E, tocopherol, 1,000 units capsule, Take 1,000 Units by mouth daily, Disp: , Rfl:     acetaminophen (TYLENOL) 325 mg suppository, Insert 650 mg into the rectum every 4 (four) hours as needed for mild pain (Patient not taking: Reported on 3/5/2025), Disp: , Rfl:     cyclobenzaprine (FLEXERIL) 10 mg tablet, Take 1 tablet (10 mg total) by mouth 3 (three) times a day as needed for muscle spasms (Patient not taking: Reported on 4/1/2024), Disp: 30 tablet, Rfl: 2    No Known Allergies    Physical Exam:    Pulse 81   Temp 98.5 °F (36.9 °C)   Resp 16   Ht 5' 3\" (1.6 m)   Wt 76.2 kg (168 lb)   SpO2 98%   BMI 29.76 kg/m²     Constitutional:normal, well developed, well nourished, alert, in no distress and non-toxic and no overt pain behavior.  Eyes:anicteric  HEENT:grossly intact  Neck:supple, symmetric, trachea midline and no masses   Pulmonary:even and unlabored  Cardiovascular:No edema or pitting edema present  Skin:Normal without rashes or lesions and well hydrated  Psychiatric:Mood and affect appropriate  Neurologic:Cranial Nerves II-XII grossly intact  Musculoskeletal:antalgic and ambulates with cane    Lumbar/Sacral Spine examination demonstrates.  Full range of motion " lumbar spine with pain upon: flexion, lateral rotation to the left/right, and bending to the left/right.  Bilateral lumbar paraspinals tender to palpation. Muscle spasms noted in the lumbar area bilaterally. 4/5 lower extremity strength in all muscle groups bilaterally. Positive seated straight leg raise for bilateral lower extremities.  Sensitivity to light touch intact bilateral lower extremities. 2+ reflexes in the patella and Achilles.  No ankle clonus    Imaging    Study Result    Narrative & Impression   MRI LUMBAR SPINE WITHOUT CONTRAST     INDICATION: M51.16: Intervertebral disc disorders with radiculopathy, lumbar region  G89.4: Chronic pain syndrome  R29.898: Other symptoms and signs involving the musculoskeletal system.     COMPARISON: None.     TECHNIQUE:  Multiplanar, multisequence imaging of the lumbar spine was performed. .        IMAGE QUALITY: Motion-degraded, which reduces diagnostic sensitivity.     FINDINGS:     VERTEBRAL BODIES: Mild, grade 1 anterolisthesis of L4 on L5.     Multilevel endplate centered marrow changes.     No acute fracture.     SACRUM: No acute abnormality.     DISTAL CORD AND CONUS:  Normal size and signal within the distal cord and conus.     PARASPINAL SOFT TISSUES: No acute abnormality.     LOWER THORACIC DISC SPACES: Spondylotic changes without acute critical central canal stenosis.     LUMBAR DISC SPACES: Multilevel mild degenerative disc disease.     L1-L2: No significant neuroforaminal narrowing or spinal canal stenosis.     L2-L3: No significant neuroforaminal narrowing or spinal canal stenosis.     L3-L4: Disc bulge. Moderate facet arthropathy. Mild bilateral neuroforaminal narrowing. Mild spinal canal stenosis.     L4-L5: Disc bulge. Marked facet arthropathy. Mild bilateral neuroforaminal narrowing. No significant spinal canal stenosis.     L5-S1: Disc bulge. Mild bilateral neuroforaminal narrowing. No significant spinal canal stenosis.     IMPRESSION:     Multilevel  spondylotic changes of the lumbar spine. See narrative above for full details.        Workstation performed: [id number]           No orders of the defined types were placed in this encounter.

## 2025-03-05 NOTE — PATIENT INSTRUCTIONS
What is the epidural space?    The covering over the nerve roots in the spine is call the dura.  The space surrounding this dura is call the epidural space.  This space is commonly used to deliver medications to the spine.  Nerves travel through the epidural space before they travel down into your legs.  The nerves leave the spine from small nerve holes, foramen.  Inflammation of these nerve roots may cause pain in your neck regions including shoulders and arms, and lower back regions including hip, buttocks and legs.  These nerve roots may become inflamed due to irritation from a damaged disc or from contact with the bone spur.                                                                                                    What is an epidural steroid injection and why that helps?    An epidural steroid injection places anti-inflammatory medicine into the epidural space to stop nerve root inflammation, therefore hopefully reducing hip, buttock and leg pain. By stopping or limiting nerve root inflammation we may be able to reduce your pain. The epidural injection may assist the injury to heal by reducing inflammation.  Although not always helpful, it usually reduces pain within 3 to 7 days and can take up to 2 weeks for full affect.  It may provide permanent relief or provide a period of relief that will allow other treatments like physical therapy to be more effective.    What happens during the procedure?    While laying face down on an x-ray table your skin will be well cleaned.  The physician will numb a small area of skin in your back which may sting for a few seconds. Next, the physician will use x-ray guidance to direct a small needle into the epidural space. He will then inject contrast dye to confirm that the medicine spreads to the affected nerve root in the epidural space. After this, the physician will inject the combination of numbing medicine and anti-inflammatory steroid.        What happens after  the procedure?    A dressing may be applied to the injection site.  Your will remain in the procedure suite for about 15 to 20 minutes and the nurse will monitor blood pressure and pulse.  The nurse will review your discharge instructions and you will be able to go home.  You may experience numbness or weakness to the affected limb for few hours after the procedure. If this happens do not walk without assistance.  Your physician may refer you to a physical therapist or chiropractor immediately afterwards while the numbing medicine is effective and over the next two weeks while the cortisone is working.    It may be beneficial to repeat the procedure in about 2 to 4 weeks.  A series of treatments off improved more helpful than a single injection.

## 2025-03-06 ENCOUNTER — TELEPHONE (OUTPATIENT)
Age: 65
End: 2025-03-06

## 2025-03-06 NOTE — TELEPHONE ENCOUNTER
Caller: Pt    Doctor/Office: Dr Maldonado    Call regarding :  Returning call to .      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      Call  back: [phone]

## 2025-03-06 NOTE — TELEPHONE ENCOUNTER
Caller: cristhian Alfaro    Doctor: Dr. Bowers    Reason for call: pt returning the 's call    Call back#: [phone]

## 2025-03-07 NOTE — TELEPHONE ENCOUNTER
Patient to be notified that the  is out of the office today and that he can expect a return call on Monday 3/10/25.

## 2025-03-12 ENCOUNTER — RA CDI HCC (OUTPATIENT)
Dept: OTHER | Facility: HOSPITAL | Age: 65
End: 2025-03-12

## 2025-03-18 ENCOUNTER — OFFICE VISIT (OUTPATIENT)
Dept: FAMILY MEDICINE CLINIC | Facility: CLINIC | Age: 65
End: 2025-03-18
Payer: COMMERCIAL

## 2025-03-18 VITALS
DIASTOLIC BLOOD PRESSURE: 78 MMHG | BODY MASS INDEX: 29.23 KG/M2 | HEART RATE: 90 BPM | HEIGHT: 63 IN | OXYGEN SATURATION: 99 % | TEMPERATURE: 98.5 F | RESPIRATION RATE: 18 BRPM | WEIGHT: 165 LBS | SYSTOLIC BLOOD PRESSURE: 116 MMHG

## 2025-03-18 DIAGNOSIS — Z00.00 ENCOUNTER FOR MEDICARE ANNUAL WELLNESS EXAM: Primary | ICD-10-CM

## 2025-03-18 DIAGNOSIS — E03.9 ACQUIRED HYPOTHYROIDISM: ICD-10-CM

## 2025-03-18 DIAGNOSIS — M85.852 OSTEOPENIA OF BOTH HIPS: ICD-10-CM

## 2025-03-18 DIAGNOSIS — E55.9 VITAMIN D DEFICIENCY: ICD-10-CM

## 2025-03-18 DIAGNOSIS — M85.851 OSTEOPENIA OF BOTH HIPS: ICD-10-CM

## 2025-03-18 DIAGNOSIS — Z13.220 SCREENING CHOLESTEROL LEVEL: ICD-10-CM

## 2025-03-18 PROCEDURE — G2211 COMPLEX E/M VISIT ADD ON: HCPCS | Performed by: NURSE PRACTITIONER

## 2025-03-18 PROCEDURE — G0439 PPPS, SUBSEQ VISIT: HCPCS | Performed by: NURSE PRACTITIONER

## 2025-03-18 PROCEDURE — 99213 OFFICE O/P EST LOW 20 MIN: CPT | Performed by: NURSE PRACTITIONER

## 2025-03-18 NOTE — PATIENT INSTRUCTIONS
Medicare Preventive Visit Patient Instructions  Thank you for completing your Welcome to Medicare Visit or Medicare Annual Wellness Visit today. Your next wellness visit will be due in one year (3/19/2026).  The screening/preventive services that you may require over the next 5-10 years are detailed below. Some tests may not apply to you based off risk factors and/or age. Screening tests ordered at today's visit but not completed yet may show as past due. Also, please note that scanned in results may not display below.  Preventive Screenings:  Service Recommendations Previous Testing/Comments   Colorectal Cancer Screening  * Colonoscopy    * Fecal Occult Blood Test (FOBT)/Fecal Immunochemical Test (FIT)  * Fecal DNA/Cologuard Test  * Flexible Sigmoidoscopy Age: 45-75 years old   Colonoscopy: every 10 years (may be performed more frequently if at higher risk)  OR  FOBT/FIT: every 1 year  OR  Cologuard: every 3 years  OR  Sigmoidoscopy: every 5 years  Screening may be recommended earlier than age 45 if at higher risk for colorectal cancer. Also, an individualized decision between you and your healthcare provider will decide whether screening between the ages of 76-85 would be appropriate. Colonoscopy: 10/07/2024  FOBT/FIT: Not on file  Cologuard: Not on file  Sigmoidoscopy: Not on file    Screening Current     Breast Cancer Screening Age: 40+ years old  Frequency: every 1-2 years  Not required if history of left and right mastectomy Mammogram: 11/13/2024    Screening Current   Cervical Cancer Screening Between the ages of 21-29, pap smear recommended once every 3 years.   Between the ages of 30-65, can perform pap smear with HPV co-testing every 5 years.   Recommendations may differ for women with a history of total hysterectomy, cervical cancer, or abnormal pap smears in past. Pap Smear: 10/27/2022    Screening Not Indicated   Hepatitis C Screening Once for adults born between 1945 and 1965  More frequently in  patients at high risk for Hepatitis C Hep C Antibody: 02/18/2019    Screening Current   Diabetes Screening 1-2 times per year if you're at risk for diabetes or have pre-diabetes Fasting glucose: 101 mg/dL (12/3/2024)  A1C: No results in last 5 years (No results in last 5 years)  Screening Current   Cholesterol Screening Once every 5 years if you don't have a lipid disorder. May order more often based on risk factors. Lipid panel: 12/03/2024    Screening Current     Other Preventive Screenings Covered by Medicare:  Abdominal Aortic Aneurysm (AAA) Screening: covered once if your at risk. You're considered to be at risk if you have a family history of AAA.  Lung Cancer Screening: covers low dose CT scan once per year if you meet all of the following conditions: (1) Age 55-77; (2) No signs or symptoms of lung cancer; (3) Current smoker or have quit smoking within the last 15 years; (4) You have a tobacco smoking history of at least 20 pack years (packs per day multiplied by number of years you smoked); (5) You get a written order from a healthcare provider.  Glaucoma Screening: covered annually if you're considered high risk: (1) You have diabetes OR (2) Family history of glaucoma OR (3)  aged 50 and older OR (4)  American aged 65 and older  Osteoporosis Screening: covered every 2 years if you meet one of the following conditions: (1) You're estrogen deficient and at risk for osteoporosis based off medical history and other findings; (2) Have a vertebral abnormality; (3) On glucocorticoid therapy for more than 3 months; (4) Have primary hyperparathyroidism; (5) On osteoporosis medications and need to assess response to drug therapy.   Last bone density test (DXA Scan): 04/10/2023.  HIV Screening: covered annually if you're between the age of 15-65. Also covered annually if you are younger than 15 and older than 65 with risk factors for HIV infection. For pregnant patients, it is covered up to 3  times per pregnancy.    Immunizations:  Immunization Recommendations   Influenza Vaccine Annual influenza vaccination during flu season is recommended for all persons aged >= 6 months who do not have contraindications   Pneumococcal Vaccine   * Pneumococcal conjugate vaccine = PCV13 (Prevnar 13), PCV15 (Vaxneuvance), PCV20 (Prevnar 20)  * Pneumococcal polysaccharide vaccine = PPSV23 (Pneumovax) Adults 19-63 yo with certain risk factors or if 65+ yo  If never received any pneumonia vaccine: recommend Prevnar 20 (PCV20)  Give PCV20 if previously received 1 dose of PCV13 or PPSV23   Hepatitis B Vaccine 3 dose series if at intermediate or high risk (ex: diabetes, end stage renal disease, liver disease)   Respiratory syncytial virus (RSV) Vaccine - COVERED BY MEDICARE PART D  * RSVPreF3 (Arexvy) CDC recommends that adults 60 years of age and older may receive a single dose of RSV vaccine using shared clinical decision-making (SCDM)   Tetanus (Td) Vaccine - COST NOT COVERED BY MEDICARE PART B Following completion of primary series, a booster dose should be given every 10 years to maintain immunity against tetanus. Td may also be given as tetanus wound prophylaxis.   Tdap Vaccine - COST NOT COVERED BY MEDICARE PART B Recommended at least once for all adults. For pregnant patients, recommended with each pregnancy.   Shingles Vaccine (Shingrix) - COST NOT COVERED BY MEDICARE PART B  2 shot series recommended in those 19 years and older who have or will have weakened immune systems or those 50 years and older     Health Maintenance Due:      Topic Date Due   • Breast Cancer Screening: Mammogram  11/13/2026   • Colorectal Cancer Screening  10/05/2034   • Hepatitis C Screening  Completed     Immunizations Due:      Topic Date Due   • Influenza Vaccine (1) 09/01/2024   • COVID-19 Vaccine (6 - 2024-25 season) 09/01/2024     Advance Directives   What are advance directives?  Advance directives are legal documents that state your  wishes and plans for medical care. These plans are made ahead of time in case you lose your ability to make decisions for yourself. Advance directives can apply to any medical decision, such as the treatments you want, and if you want to donate organs.   What are the types of advance directives?  There are many types of advance directives, and each state has rules about how to use them. You may choose a combination of any of the following:  Living will:  This is a written record of the treatment you want. You can also choose which treatments you do not want, which to limit, and which to stop at a certain time. This includes surgery, medicine, IV fluid, and tube feedings.   Durable power of  for healthcare (DPAHC):  This is a written record that states who you want to make healthcare choices for you when you are unable to make them for yourself. This person, called a proxy, is usually a family member or a friend. You may choose more than 1 proxy.  Do not resuscitate (DNR) order:  A DNR order is used in case your heart stops beating or you stop breathing. It is a request not to have certain forms of treatment, such as CPR. A DNR order may be included in other types of advance directives.  Medical directive:  This covers the care that you want if you are in a coma, near death, or unable to make decisions for yourself. You can list the treatments you want for each condition. Treatment may include pain medicine, surgery, blood transfusions, dialysis, IV or tube feedings, and a ventilator (breathing machine).  Values history:  This document has questions about your views, beliefs, and how you feel and think about life. This information can help others choose the care that you would choose.  Why are advance directives important?  An advance directive helps you control your care. Although spoken wishes may be used, it is better to have your wishes written down. Spoken wishes can be misunderstood, or not followed.  Treatments may be given even if you do not want them. An advance directive may make it easier for your family to make difficult choices about your care.   Urinary Incontinence   Urinary incontinence (UI)  is when you lose control of your bladder. UI develops because your bladder cannot store or empty urine properly. The 3 most common types of UI are stress incontinence, urge incontinence, or both.  Medicines:   May be given to help strengthen your bladder control. Report any side effects of medication to your healthcare provider.  Do pelvic muscle exercises often:  Your pelvic muscles help you stop urinating. Squeeze these muscles tight for 5 seconds, then relax for 5 seconds. Gradually work up to squeezing for 10 seconds. Do 3 sets of 15 repetitions a day, or as directed. This will help strengthen your pelvic muscles and improve bladder control.  Train your bladder:  Go to the bathroom at set times, such as every 2 hours, even if you do not feel the urge to go. You can also try to hold your urine when you feel the urge to go. For example, hold your urine for 5 minutes when you feel the urge to go. As that becomes easier, hold your urine for 10 minutes.   Self-care:   Keep a UI record.  Write down how often you leak urine and how much you leak. Make a note of what you were doing when you leaked urine.  Drink liquids as directed. You may need to limit the amount of liquid you drink to help control your urine leakage. Do not drink any liquid right before you go to bed. Limit or do not have drinks that contain caffeine or alcohol.   Prevent constipation.  Eat a variety of high-fiber foods. Good examples are high-fiber cereals, beans, vegetables, and whole-grain breads. Walking is the best way to trigger your intestines to have a bowel movement.  Exercise regularly and maintain a healthy weight.  Weight loss and exercise will decrease pressure on your bladder and help you control your leakage.   Use a catheter as  directed  to help empty your bladder. A catheter is a tiny, plastic tube that is put into your bladder to drain your urine.   Go to behavior therapy as directed.  Behavior therapy may be used to help you learn to control your urge to urinate.    Weight Management   Why it is important to manage your weight:  Being overweight increases your risk of health conditions such as heart disease, high blood pressure, type 2 diabetes, and certain types of cancer. It can also increase your risk for osteoarthritis, sleep apnea, and other respiratory problems. Aim for a slow, steady weight loss. Even a small amount of weight loss can lower your risk of health problems.  How to lose weight safely:  A safe and healthy way to lose weight is to eat fewer calories and get regular exercise. You can lose up about 1 pound a week by decreasing the number of calories you eat by 500 calories each day.   Healthy meal plan for weight management:  A healthy meal plan includes a variety of foods, contains fewer calories, and helps you stay healthy. A healthy meal plan includes the following:  Eat whole-grain foods more often.  A healthy meal plan should contain fiber. Fiber is the part of grains, fruits, and vegetables that is not broken down by your body. Whole-grain foods are healthy and provide extra fiber in your diet. Some examples of whole-grain foods are whole-wheat breads and pastas, oatmeal, brown rice, and bulgur.  Eat a variety of vegetables every day.  Include dark, leafy greens such as spinach, kale, barry greens, and mustard greens. Eat yellow and orange vegetables such as carrots, sweet potatoes, and winter squash.   Eat a variety of fruits every day.  Choose fresh or canned fruit (canned in its own juice or light syrup) instead of juice. Fruit juice has very little or no fiber.  Eat low-fat dairy foods.  Drink fat-free (skim) milk or 1% milk. Eat fat-free yogurt and low-fat cottage cheese. Try low-fat cheeses such as  mozzarella and other reduced-fat cheeses.  Choose meat and other protein foods that are low in fat.  Choose beans or other legumes such as split peas or lentils. Choose fish, skinless poultry (chicken or turkey), or lean cuts of red meat (beef or pork). Before you cook meat or poultry, cut off any visible fat.   Use less fat and oil.  Try baking foods instead of frying them. Add less fat, such as margarine, sour cream, regular salad dressing and mayonnaise to foods. Eat fewer high-fat foods. Some examples of high-fat foods include french fries, doughnuts, ice cream, and cakes.  Eat fewer sweets.  Limit foods and drinks that are high in sugar. This includes candy, cookies, regular soda, and sweetened drinks.  Exercise:  Exercise at least 30 minutes per day on most days of the week. Some examples of exercise include walking, biking, dancing, and swimming. You can also fit in more physical activity by taking the stairs instead of the elevator or parking farther away from stores. Ask your healthcare provider about the best exercise plan for you.      © Copyright [employer] 2018 Information is for End User's use only and may not be sold, redistributed or otherwise used for commercial purposes. All illustrations and images included in CareNotes® are the copyrighted property of [employer]M., Inc[employer] or [employer]

## 2025-03-18 NOTE — PROGRESS NOTES
Name: Angelina Yanes      : 1960      MRN: [id number]  Encounter Provider: KEM Blood  Encounter Date: 3/18/2025   Encounter department: [facility] PRIMARY CARE    Assessment & Plan  Osteopenia of both hips    Orders:    DXA bone density spine hip and pelvis; Future    Vitamin D 25 hydroxy; Future    Acquired hypothyroidism    Orders:    TSH, 3rd generation with Free T4 reflex; Future    Comprehensive metabolic panel; Future    Screening cholesterol level    Orders:    Lipid panel; Future    Vitamin D deficiency    Orders:    Vitamin D 25 hydroxy; Future    Encounter for Medicare annual wellness exam            Preventive health issues were discussed with patient, and age appropriate screening tests were ordered as noted in patient's After Visit Summary. Personalized health advice and appropriate referrals for health education or preventive services given if needed, as noted in patient's After Visit Summary.    History of Present Illness     Here for medicare wellness visit and follow up  Following closely with Dr. Bowers- will be starting aquatherapy soon  Reviewed labs done from December, will stay on same dose of Levothyroxine       Patient Care Team:  KEM Blood as PCP - General (Family Medicine)  Ajay Grant DO as PCP - [facility]CP-[facility]  [facility]  Naina Lr DO (Obstetrics and Gynecology)    Review of Systems   Constitutional:  Negative for activity change, diaphoresis, fatigue and fever.   HENT:  Negative for congestion, facial swelling, hearing loss, rhinorrhea, sinus pressure, sinus pain, sneezing, sore throat and voice change.    Eyes:  Negative for discharge and visual disturbance.   Respiratory:  Negative for cough, choking, chest tightness, shortness of breath, wheezing and stridor.    Cardiovascular:  Negative for chest pain, palpitations and leg swelling.   Gastrointestinal:  Negative for  abdominal distention, abdominal pain, constipation, diarrhea, nausea and vomiting.   Endocrine: Negative for polydipsia, polyphagia and polyuria.   Genitourinary:  Negative for difficulty urinating, dysuria, frequency and urgency.   Musculoskeletal:  Positive for arthralgias, back pain and gait problem.   Skin:  Negative for color change, rash and wound.   Neurological:  Negative for dizziness, syncope, speech difficulty, weakness, light-headedness and headaches.   Hematological:  Negative for adenopathy. Does not bruise/bleed easily.   Psychiatric/Behavioral:  Negative for agitation, behavioral problems, confusion, hallucinations, sleep disturbance and suicidal ideas. The patient is not nervous/anxious.      Medical History Reviewed by provider this encounter:  Tobacco  Allergies  Meds  Problems  Med Hx  Surg Hx  Fam Hx       Annual Wellness Visit Questionnaire   Angelina is here for her Subsequent Wellness visit.     Health Risk Assessment:   Patient rates overall health as good. Patient feels that their physical health rating is same. Patient is satisfied with their life. Eyesight was rated as same. Hearing was rated as same. Patient feels that their emotional and mental health rating is same. Patients states they are never, rarely angry. Patient states they are never, rarely unusually tired/fatigued. Pain experienced in the last 7 days has been none. Patient states that she has experienced no weight loss or gain in last 6 months.     Depression Screening:   PHQ-2 Score: 0      Fall Risk Screening:   In the past year, patient has experienced: no history of falling in past year      Urinary Incontinence Screening:   Patient has leaked urine accidently in the last six months.     Home Safety:  Patient has trouble with stairs inside or outside of their home. Patient has working smoke alarms and has working carbon monoxide detector. Home safety hazards include: none.     Nutrition:   Current diet is Regular.      Medications:   Patient is not currently taking any over-the-counter supplements. Patient is able to manage medications.     Activities of Daily Living (ADLs)/Instrumental Activities of Daily Living (IADLs):   Walk and transfer into and out of bed and chair?: Yes  Dress and groom yourself?: Yes    Bathe or shower yourself?: Yes    Feed yourself? Yes  Do your laundry/housekeeping?: Yes  Manage your money, pay your bills and track your expenses?: Yes  Make your own meals?: Yes    Do your own shopping?: Yes    Previous Hospitalizations:   Any hospitalizations or ED visits within the last 12 months?: No      Advance Care Planning:   Living will: Yes    Advanced directive: Yes      PREVENTIVE SCREENINGS      Cardiovascular Screening:    General: Screening Current    Due for: Lipid Panel      Diabetes Screening:     General: Screening Current    Due for: Blood Glucose      Colorectal Cancer Screening:     General: Screening Current      Breast Cancer Screening:     General: Screening Current      Cervical Cancer Screening:    General: Screening Not Indicated      Lung Cancer Screening:     General: Screening Not Indicated      Hepatitis C Screening:    General: Screening Current    Screening, Brief Intervention, and Referral to Treatment (SBIRT)     Screening  Typical number of drinks in a day: 0  Typical number of drinks in a week: 0  Interpretation: Low risk drinking behavior.    Single Item Drug Screening:  How often have you used an illegal drug (including marijuana) or a prescription medication for non-medical reasons in the past year? never    Single Item Drug Screen Score: 0  Interpretation: Negative screen for possible drug use disorder    Social Drivers of Health     Financial Resource Strain: Low Risk  (3/4/2024)    Overall Financial Resource Strain (CARDIA)     Difficulty of Paying Living Expenses: Not hard at all   Food Insecurity: No Food Insecurity (3/18/2025)    Hunger Vital Sign     Worried About  "Running Out of Food in the Last Year: Never true     Ran Out of Food in the Last Year: Never true   Transportation Needs: No Transportation Needs (3/18/2025)    PRAPARE - Transportation     Lack of Transportation (Medical): No     Lack of Transportation (Non-Medical): No   Housing Stability: Low Risk  (3/18/2025)    Housing Stability Vital Sign     Unable to Pay for Housing in the Last Year: No     Number of Times Moved in the Last Year: 0     Homeless in the Last Year: No   Utilities: Not At Risk (3/18/2025)    [facility] Utilities     Threatened with loss of utilities: No     No results found.    Objective   /78   Pulse 90   Temp 98.5 °F (36.9 °C)   Resp 18   Ht 5' 3\" (1.6 m)   Wt 74.8 kg (165 lb)   SpO2 99%   BMI 29.23 kg/m²     Physical Exam  Vitals and nursing note reviewed.   Constitutional:       General: She is not in acute distress.     Appearance: She is well-developed. She is not diaphoretic.   Neck:      Thyroid: No thyromegaly.      Vascular: No carotid bruit.      Trachea: No tracheal deviation.   Cardiovascular:      Rate and Rhythm: Normal rate and regular rhythm.      Heart sounds: Normal heart sounds.   Pulmonary:      Effort: Pulmonary effort is normal. No respiratory distress.      Breath sounds: Normal breath sounds. No wheezing.   Musculoskeletal:         General: Tenderness (low back- chronic) present. Normal range of motion.      Cervical back: Normal range of motion and neck supple.   Skin:     General: Skin is warm and dry.      Findings: No erythema or rash.   Neurological:      Mental Status: She is alert and oriented to person, place, and time.   Psychiatric:         Mood and Affect: Mood normal.         Behavior: Behavior normal. Behavior is cooperative.         Thought Content: Thought content normal.         Judgment: Judgment normal.         "

## 2025-03-27 ENCOUNTER — EVALUATION (OUTPATIENT)
Dept: PHYSICAL THERAPY | Age: 65
End: 2025-03-27
Payer: COMMERCIAL

## 2025-03-27 DIAGNOSIS — M79.604 BILATERAL LEG PAIN: ICD-10-CM

## 2025-03-27 DIAGNOSIS — M79.605 BILATERAL LEG PAIN: ICD-10-CM

## 2025-03-27 DIAGNOSIS — M51.16 LUMBAR DISC DISEASE WITH RADICULOPATHY: ICD-10-CM

## 2025-03-27 DIAGNOSIS — R29.898 LEFT LEG WEAKNESS: ICD-10-CM

## 2025-03-27 DIAGNOSIS — M25.551 RIGHT HIP PAIN: ICD-10-CM

## 2025-03-27 DIAGNOSIS — G89.4 CHRONIC PAIN SYNDROME: ICD-10-CM

## 2025-03-27 PROCEDURE — 97113 AQUATIC THERAPY/EXERCISES: CPT

## 2025-03-27 PROCEDURE — 97161 PT EVAL LOW COMPLEX 20 MIN: CPT

## 2025-03-27 NOTE — PROGRESS NOTES
PT Evaluation     Today's date: 3/27/2025  Patient name: Angelina Yanes  : 1960  MRN: [id number]  Referring provider: Cori Bowers MD  Dx:   Encounter Diagnosis     ICD-10-CM    1. Left leg weakness  R29.898 Ambulatory referral to Physical Therapy      2. Lumbar disc disease with radiculopathy  M51.16 Ambulatory referral to Physical Therapy      3. Chronic pain syndrome  G89.4 Ambulatory referral to Physical Therapy      4. Bilateral leg pain  M79.604 Ambulatory referral to Physical Therapy    M79.605       5. Right hip pain  M25.551 Ambulatory referral to Physical Therapy          Start Time: 1505  Stop Time: 1610  Total time in clinic (min): 65 minutes    Assessment  Impairments: abnormal coordination, abnormal gait, abnormal muscle firing, abnormal muscle tone, abnormal or restricted ROM, abnormal movement, activity intolerance, impaired balance, impaired physical strength, lacks appropriate home exercise program, pain with function, safety issue, weight-bearing intolerance, poor posture , participation limitations, activity limitations and endurance    Assessment details: Patient is a 65 y.o. female presenting to initial examination with chief complaint of chronic lumbar pain with diagnosis of lumbar disc disease. Signs and symptoms are consistent with referring diagnosis. Primary impairments include impaired ROM, flexibility, mobility, strength, and gait/balance. As a result of impairments patient experiences limitations with functional/daily activities including prolonged weight bearing, ambulating, steps, transfers, and household chores/ADLs. Educated patient regarding plan of care and answered all patient questions to patient satisfaction. Patient would benefit from skilled PT interventions to address above impairments in order to maximize functional capacity. Thank you for the referral.    Understanding of Dx/Px/POC: good     Prognosis: good    Goals  Impairment Goals: 4-6 weeks  -  Patient to decrease pain by 1-4 levels to 0-2/10 at worst for improved activity tolerance.   - Patient to have improved overall strength by 1/2 - 1 grade to at least 4+/5 or WFL t/o for improved ease with ADLs/gait.   - Patient to have improved overall mobility/ROM/flexibility by at least 25-50% to WFL t/o for improved overall functional mobility.     Functional Goals: by discharge  - Patient to discharge to independent [facility].  - Patient to increase FOTO to at least 65 for improved overall functional mobility.   - Patient to have less sleep disturbances for improved sleep quality.   - Patient to have improved ease with transfers/steps.   - Patient to have improved ease with prolonged weightbearing/standing/ambulation for improved ease with ADLs.       Plan  Patient would benefit from: skilled physical therapy  Referral necessary: Yes  Planned modality interventions: cryotherapy and thermotherapy: hydrocollator packs    Planned therapy interventions: joint mobilization, manual therapy, massage, [place] taping, motor coordination training, neuromuscular re-education, orthotic fitting/training, patient education, postural training, strengthening, stretching, therapeutic activities, therapeutic exercise, therapeutic training, flexibility, functional ROM exercises, gait training, home exercise program, body mechanics training, behavior modification, balance/weight bearing training, balance, activity modification, abdominal trunk stabilization, IASTM and aquatic therapy    Frequency: 2-3x week  Duration in weeks: 10  Plan of Care beginning date: 3/27/2025  Plan of Care expiration date: 6/6/2025  Treatment plan discussed with: patient        Subjective Evaluation    History of Present Illness  Mechanism of injury: Pt is single. Lives alone in 2 story home. Half bath is on first floor. Has been staying on the first level sleeping on the sofa for the past 2 years (has an elderly dog).     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.     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 About a year ago, had her L hip xrayed, as it started with having L hip pain. This past summer, pain transferred over to her R side, to the point where she had significant difficulty with walking. Just saw referring MD this past month, was told that she had bulging disc in her lumbar spine as well as a slightly pinched nerve. Will also be getting MRI on B/L hips - will be getting an epidural (she thinks) in her spine on April 11th.     Difficulties/limitations: prolonged standing/walking, transfers, steps (step to step pattern); household chores/ADLs; (+) sleep disturbances a bit when she rolls onto her side; however, pt notes she feels a little bit better when she sleeps on her stomach.     Pt reports that she will have a tingling sensation in between her shoulder blades that comes and goes. Denies having any radicular symptoms (numbness, tingling, burning) down her BLE's; however, pt reports she will get pain down her BLE's. Denies any bowel/bladder issues. Denies saddle paraesthesias.       MD visit note on 3/5/25:   History of Present Illness:  The patient is a 65 y.o. female who presents for a follow up office visit in regards to Back Pain.   The patient's current symptoms include 8 out of 10 intermittent sharp, pins-and-needles 1 particular time pattern.     Current pain medications includes: Mobic, Cymbalta.  The patient reports that this regimen is providing 75% pain relief.  The patient is reporting no side effects from this pain medication regimen.    Assessment:  1. Right hip pain   2. Left leg weakness   3. Lumbar disc disease with radiculopathy   4. Chronic pain syndrome   5. Bilateral leg pain         Plan:  Patient is a 65-year-old female with complaints of low back pain, left leg pain and left rhomboid pain with chronic pain some secondary lumbar degenerative disease, lumbar  radiculopathy presents to office for follow-up visit.  At last office we did order an MRI of the lumbar spine which shows marked facet arthropathy with mild bilateral foraminal narrowing from L3-S1.  We did start patient on Cymbalta 30 mg p.o. nightly to which she does report some alleviation of symptoms.  Patient did report significant alleviation of symptoms when she was taking the Medrol Dosepak.        1.  We will provide patient with water therapy lumbar core strengthening exercises  2.  We will schedule patient for a L4-L5 interlaminar epidural steroid injection  3.  We will continue Cymbalta 30 mg p.o. nightly  4.  Follow-up 1 month after injection     Complete risks and benefits including bleeding, infection, tissue reaction, nerve injury and allergic reaction were discussed. The approach was demonstrated using models and literature was provided. Verbal and written consent was obtained.    MRI on 2/21/25:  MRI LUMBAR SPINE WITHOUT CONTRAST     INDICATION: M51.16: Intervertebral disc disorders with radiculopathy, lumbar region  G89.4: Chronic pain syndrome  R29.898: Other symptoms and signs involving the musculoskeletal system.     COMPARISON: None.     TECHNIQUE:  Multiplanar, multisequence imaging of the lumbar spine was performed. .        IMAGE QUALITY: Motion-degraded, which reduces diagnostic sensitivity.     FINDINGS:     VERTEBRAL BODIES: Mild, grade 1 anterolisthesis of L4 on L5.     Multilevel endplate centered marrow changes.     No acute fracture.     SACRUM: No acute abnormality.     DISTAL CORD AND CONUS:  Normal size and signal within the distal cord and conus.     PARASPINAL SOFT TISSUES: No acute abnormality.     LOWER THORACIC DISC SPACES: Spondylotic changes without acute critical central canal stenosis.     LUMBAR DISC SPACES: Multilevel mild degenerative disc disease.     L1-L2: No significant neuroforaminal narrowing or spinal canal stenosis.     L2-L3: No significant neuroforaminal  narrowing or spinal canal stenosis.     L3-L4: Disc bulge. Moderate facet arthropathy. Mild bilateral neuroforaminal narrowing. Mild spinal canal stenosis.     L4-L5: Disc bulge. Marked facet arthropathy. Mild bilateral neuroforaminal narrowing. No significant spinal canal stenosis.     L5-S1: Disc bulge. Mild bilateral neuroforaminal narrowing. No significant spinal canal stenosis.     IMPRESSION:     Multilevel spondylotic changes of the lumbar spine. See narrative above for full details.        Patient Goals  Patient goals for therapy: increased strength, decreased pain, improved balance, increased motion and independence with ADLs/IADLs    Pain  Current pain ratin (in R hip today and very stiff walking today)  At best pain ratin  At worst pain ratin  Quality: dull ache  Relieving factors: medications (ibuprofen)    Treatments  Current treatment: physical therapy        Objective     Concurrent Complaints  Negative for night pain, disturbed sleep, bladder dysfunction, bowel dysfunction and saddle (S4) numbness    Active Range of Motion     Lumbar   Flexion:  WFL  Extension:  Restriction level: minimal  Left lateral flexion:  Restriction level: moderate  Right lateral flexion:  Restriction level: moderate  Left rotation:  Restriction level: moderate  Right rotation:  Restriction level: moderate    Additional Active Range of Motion Details  Hamstrings grossly WFL t/o B/L.     Strength/Myotome Testing     Additional Strength Details  BLE's grossly 4/5 t/o.     Core musculature strength grossly 3/5 t/o.     Tests     Lumbar     Left   Negative crossed SLR and passive SLR.     Right   Negative crossed SLR and passive SLR.     Right Hip   Positive MICHELET.     Additional Tests Details  Significant pain with R side MICHELET done passively.                POC expires: 25  Date 3/27            Visit count 1            FOTO               Dx: Left leg weakness (R29.898 [ICD-10-CM])  Lumbar disc disease with  radiculopathy (M51.16 [ICD-10-CM])  Chronic pain syndrome (G89.4 [ICD-10-CM])  Bilateral leg pain (M79.604,M79.605 [ICD-10-CM])  Right hip pain (M25.551 [ICD-10-CM])     Precautions:     Daily Treatment Diary     Manual  3/27                                                                                 Exercise Diary  3/27            Water walking 10'             Postural training             Gait training             Home exercise pgm/patient education             Wall: t/h raises 1' ea            Hip ff/ext swings 1' ea             Hip abd/add 1' ea            Marching 1'             squats             Knee flex/ext             Step-ups (fwd/bkwd/ss)             SLS (eyes open/closed)             SLS w UE mvmt  AROM/ball toss             Weight shifting             UE Noodle work x 4              UE AROM             Resistive UE work (paddles, bells, TB)             Core work on noodle (sitting/stdg)             Sit on noodle with movement             Seated on pool bench w proper posture             Ankle df/pf             marching             Hip Ab/add             Knee flex/ext             Deep water mvmt             Deep water tx/stretching Deep water noodle hang 5'             Specific self - stretches wall/steps                 Modalities  3/27            whirlpool 5'

## 2025-03-31 ENCOUNTER — APPOINTMENT (OUTPATIENT)
Dept: PHYSICAL THERAPY | Age: 65
End: 2025-03-31
Payer: COMMERCIAL

## 2025-04-11 ENCOUNTER — HOSPITAL ENCOUNTER (OUTPATIENT)
Dept: RADIOLOGY | Facility: HOSPITAL | Age: 65
End: 2025-04-11
Payer: COMMERCIAL

## 2025-04-11 VITALS
OXYGEN SATURATION: 96 % | HEART RATE: 96 BPM | SYSTOLIC BLOOD PRESSURE: 154 MMHG | TEMPERATURE: 96.8 F | RESPIRATION RATE: 18 BRPM | DIASTOLIC BLOOD PRESSURE: 91 MMHG

## 2025-04-11 DIAGNOSIS — M51.16 LUMBAR DISC DISEASE WITH RADICULOPATHY: ICD-10-CM

## 2025-04-11 DIAGNOSIS — E03.9 HYPOTHYROIDISM, UNSPECIFIED TYPE: ICD-10-CM

## 2025-04-11 PROCEDURE — 62323 NJX INTERLAMINAR LMBR/SAC: CPT | Performed by: ANESTHESIOLOGY

## 2025-04-11 RX ORDER — METHYLPREDNISOLONE ACETATE 80 MG/ML
80 INJECTION, SUSPENSION INTRA-ARTICULAR; INTRALESIONAL; INTRAMUSCULAR; PARENTERAL; SOFT TISSUE ONCE
Status: COMPLETED | OUTPATIENT
Start: 2025-04-11 | End: 2025-04-11

## 2025-04-11 RX ORDER — LEVOTHYROXINE SODIUM 50 UG/1
50 TABLET ORAL DAILY
Qty: 90 TABLET | Refills: 1 | Status: SHIPPED | OUTPATIENT
Start: 2025-04-11

## 2025-04-11 RX ADMIN — METHYLPREDNISOLONE ACETATE 80 MG: 80 INJECTION, SUSPENSION INTRA-ARTICULAR; INTRALESIONAL; INTRAMUSCULAR; PARENTERAL; SOFT TISSUE at 13:34

## 2025-04-11 RX ADMIN — IOHEXOL 1 ML: 300 INJECTION, SOLUTION INTRAVENOUS at 13:33

## 2025-04-11 NOTE — TELEPHONE ENCOUNTER
Reason for call:   [x] Refill   [] Prior Auth  [] Other:     Office:   [x] PCP/Provider - [facility]   [] Specialty/Provider -     Medication:     levothyroxine 50 mcg tablet       Dose/Frequency:     50 mcg, Oral, Daily       Quantity: 90    Pharmacy: [facility] [address] [place] [address] [id number]     Local Pharmacy   Does the patient have enough for 3 days?   [x] Yes   [] No - Send as HP to POD    Mail Away Pharmacy   Does the patient have enough for 10 days?   [] Yes   [] No - Send as HP to POD

## 2025-04-11 NOTE — H&P
Assessment:  1. Lumbar disc disease with radiculopathy  FL spine and pain procedure    FL spine and pain procedure          Plan:  Angelina Yanes is a 65 y.o. female with complaints of low back and leg pain presents to surgical center for procedure.  We will perform a L4-L5 interlaminar epidural steroid injection  2. Follow-up 1 month after injection    Complete risks and benefits including bleeding, infection, tissue reaction, nerve injury and allergic reaction were discussed. The approach was demonstrated using models and literature was provided. Verbal and written consent was obtained.    My impressions and treatment recommendations were discussed in detail with the patient who verbalized understanding and had no further questions.  Discharge instructions were provided. I personally saw and examined the patient and I agree with the above discussed plan of care.    Orders Placed This Encounter   Procedures    FL spine and pain procedure     L4-L5 interlaminar epidural steroid injection     Standing Status:   Standing     Number of Occurrences:   1     Reason for Exam::   Lumbar radiculopathy     Anticoagulant hold needed?:   No     New Medications Ordered This Visit   Medications    iohexol (OMNIPAQUE) 300 mg/mL injection 1 mL    methylPREDNISolone acetate (DEPO-MEDROL) injection 80 mg       History of Present Illness:  Angelina Yanes is a 65 y.o. female who presents for a follow up office visit in regards to low back and leg pain.   The patient’s current symptoms include it is a sharp constant, throbbing.  There are particular time pattern.      I have personally reviewed and/or updated the patient's past medical history, past surgical history, family history, social history, current medications, allergies, and vital signs today.     Review of Systems   Musculoskeletal:  Positive for arthralgias and back pain.   All other systems reviewed and are negative.      Patient Active Problem List   Diagnosis     Hypertension    Hypothyroidism    Breast cancer screening by mammogram    Genital herpes    Stress reaction    Seborrheic keratosis    Inflamed seborrheic keratosis    Left leg weakness    Chronic pain syndrome    Lumbar disc disease with radiculopathy       Past Medical History:   Diagnosis Date    Disease of thyroid gland     Genital herpes     Hypertension        Past Surgical History:   Procedure Laterality Date    APPENDECTOMY      CARPAL TUNNEL RELEASE      COLONOSCOPY      CYSTOSCOPY      TONSILLECTOMY         Family History   Problem Relation Age of Onset    Heart disease Mother     Diabetes Mother     Heart disease Father     No Known Problems Sister     No Known Problems Maternal Grandmother     No Known Problems Maternal Grandfather     No Known Problems Paternal Grandmother     No Known Problems Paternal Grandfather     No Known Problems Maternal Aunt     No Known Problems Maternal Aunt     Breast cancer Other        Social History     Occupational History    Occupation:    Tobacco Use    Smoking status: Former     Current packs/day: 0.00     Types: Cigarettes     Quit date: 1988     Years since quittin.7    Smokeless tobacco: Never   Vaping Use    Vaping status: Never Used   Substance and Sexual Activity    Alcohol use: No    Drug use: No    Sexual activity: Not Currently       Current Outpatient Medications on File Prior to Encounter   Medication Sig    ascorbic acid (VITAMIN C) 500 MG tablet Take 500 mg by mouth daily    calcium citrate-vitamin D 315 mg-5 mcg tablet Take 1 tablet by mouth 2 (two) times a day    cyclobenzaprine (FLEXERIL) 10 mg tablet Take 1 tablet (10 mg total) by mouth 3 (three) times a day as needed for muscle spasms (Patient not taking: Reported on 2024)    DULoxetine (CYMBALTA) 30 mg delayed release capsule Take 1 capsule (30 mg total) by mouth daily    Garlic 400 MG TBEC Take by mouth    lisinopril (ZESTRIL) 10 mg tablet Take 1 tablet (10 mg total) by  mouth daily    magnesium 30 MG tablet Take 250 mg by mouth 2 (two) times a day    Misc Natural Products (NEURIVA PO) Take by mouth    Multiple Vitamins-Minerals (CENTRUM SILVER WOMEN 50+ PO)     ofloxacin (OCUFLOX) 0.3 % ophthalmic solution Apply 2 drops in the left eye every 2 hours while awake for 2 days, THEN 2 drops in the left eye 4 times daily for 5 days    Restasis 0.05 % ophthalmic emulsion INSTILL 1 DROP INTO EACH EYE EVERY 12 HOURS    vitamin E 100 UNIT capsule Take 100 Units by mouth daily    vitamin E, tocopherol, 1,000 units capsule Take 1,000 Units by mouth daily    [DISCONTINUED] levothyroxine 50 mcg tablet Take 1 tablet by mouth once daily     No current facility-administered medications on file prior to encounter.       No Known Allergies    Physical Exam:    /80   Pulse 105   Temp (!) 96.8 °F (36 °C) (Temporal)   Resp 18   SpO2 98%     Constitutional:normal, well developed, well nourished, alert, in no distress and non-toxic and no overt pain behavior.  Eyes:anicteric  HEENT:grossly intact  Neck:supple, symmetric, trachea midline and no masses   Pulmonary:even and unlabored  Cardiovascular:No edema or pitting edema present  Skin:Normal without rashes or lesions and well hydrated  Psychiatric:Mood and affect appropriate  Neurologic:Cranial Nerves II-XII grossly intact  Musculoskeletal:normal

## 2025-04-11 NOTE — DISCHARGE INSTR - LAB
Epidural Steroid Injection   WHAT YOU NEED TO KNOW:   An epidural steroid injection (BILL) is a procedure to inject steroid medicine into the epidural space. The epidural space is between your spinal cord and vertebrae. Steroids reduce inflammation and fluid buildup in your spine that may be causing pain. You may be given pain medicine along with the steroids.          ACTIVITY  Do not drive or operate machinery today.  No strenuous activity today - bending, lifting, etc.  You may resume normal activites starting tomorrow - start slowly and as tolerated.  You may shower today, but no tub baths or hot tubs.  You may have numbness for several hours from the local anesthetic. Please use caution and common sense, especially with weight-bearing activities.    CARE OF THE INJECTION SITE  If you have soreness or pain, apply ice to the area today (20 minutes on/20 minutes off).  Starting tomorrow, you may use warm, moist heat or ice if needed.  You may have an increase or change in your discomfort for 36-48 hours after your treatment.  Apply ice and continue with any pain medication you have been prescribed.  Notify [facility] if you have any of the following: redness, drainage, swelling, headache, stiff neck or fever above 100°F.    SPECIAL INSTRUCTIONS  Our office will contact you in approximately 14 days for a progress report.    MEDICATIONS  Continue to take all routine medications.  Our office may have instructed you to hold some medications.    As no general anesthesia was used in today's procedure, you should not experience any side effects related to anesthesia.     If you are diabetic, the steroids used in today's injection may temporarily increase your blood sugar levels after the first few days after your injection. Please keep a close eye on your sugars and alert the doctor who manages your diabetes if your sugars are significantly high from your baseline or you are symptomatic.     If you have a  problem specifically related to your procedure, please call our office at [phone].  Problems not related to your procedure should be directed to your primary care physician.

## 2025-04-21 ENCOUNTER — HOSPITAL ENCOUNTER (OUTPATIENT)
Dept: BONE DENSITY | Facility: HOSPITAL | Age: 65
Discharge: HOME | End: 2025-04-21
Payer: COMMERCIAL

## 2025-04-21 DIAGNOSIS — M85.852 OSTEOPENIA OF BOTH HIPS: ICD-10-CM

## 2025-04-21 DIAGNOSIS — M85.851 OSTEOPENIA OF BOTH HIPS: ICD-10-CM

## 2025-04-21 PROCEDURE — 77080 DXA BONE DENSITY AXIAL: CPT

## 2025-04-23 DIAGNOSIS — I10 HYPERTENSION, UNSPECIFIED TYPE: ICD-10-CM

## 2025-04-24 ENCOUNTER — EVALUATION (OUTPATIENT)
Dept: PHYSICAL THERAPY | Facility: CLINIC | Age: 65
End: 2025-04-24
Payer: COMMERCIAL

## 2025-04-24 DIAGNOSIS — G89.4 CHRONIC PAIN SYNDROME: ICD-10-CM

## 2025-04-24 DIAGNOSIS — M51.16 LUMBAR DISC DISEASE WITH RADICULOPATHY: ICD-10-CM

## 2025-04-24 DIAGNOSIS — R29.898 LEFT LEG WEAKNESS: Primary | ICD-10-CM

## 2025-04-24 DIAGNOSIS — M25.551 RIGHT HIP PAIN: ICD-10-CM

## 2025-04-24 DIAGNOSIS — M79.604 BILATERAL LEG PAIN: ICD-10-CM

## 2025-04-24 DIAGNOSIS — M79.605 BILATERAL LEG PAIN: ICD-10-CM

## 2025-04-24 PROCEDURE — 97110 THERAPEUTIC EXERCISES: CPT | Performed by: PHYSICAL THERAPIST

## 2025-04-24 PROCEDURE — 97162 PT EVAL MOD COMPLEX 30 MIN: CPT | Performed by: PHYSICAL THERAPIST

## 2025-04-24 PROCEDURE — 97140 MANUAL THERAPY 1/> REGIONS: CPT | Performed by: PHYSICAL THERAPIST

## 2025-04-24 RX ORDER — LISINOPRIL 10 MG/1
10 TABLET ORAL DAILY
Qty: 90 TABLET | Refills: 1 | Status: SHIPPED | OUTPATIENT
Start: 2025-04-24

## 2025-04-24 NOTE — PROGRESS NOTES
PT Evaluation     Today's date: 2025  Patient name: Angelina Yanes  : 1960  MRN: [id number]  Referring provider: Cori Bowers MD  Dx:   Encounter Diagnosis     ICD-10-CM    1. Left leg weakness  R29.898 PT plan of care cert/re-cert      2. Lumbar disc disease with radiculopathy  M51.16 PT plan of care cert/re-cert      3. Chronic pain syndrome  G89.4 PT plan of care cert/re-cert      4. Bilateral leg pain  M79.604 PT plan of care cert/re-cert    M79.605       5. Right hip pain  M25.551 PT plan of care cert/re-cert          Start Time: 1530  Stop Time: 1630  Total time in clinic (min): 60 minutes    Assessment  Impairments: abnormal gait, abnormal muscle firing, abnormal muscle tone, abnormal or restricted ROM, abnormal movement, activity intolerance, impaired balance, impaired physical strength, lacks appropriate home exercise program, pain with function, poor posture  and poor body mechanics  Functional limitations: lifting, pushing, pulling, bending, twisting, walking, stairs    Assessment details: Angelina Yanes was seen for an initial PT evaluation today. Patient is a 65 y.o. female with diagnosis of lumbar radiculopathy and past medical history significant for appendectomy, HTN, hypothyroidism. Moderate complexity evaluation  due to number of participation restrictions, functional outcome measure of 67% limitation, and evolving clinical presentation. Findings today show limitation in right hip and lumbar extension range of motion, decreased core stability and weakness of pelvis/hips, poor posture control and pain impacting overall functional mobility including ability to twist, lifting, bend, sqat. Skilled PT indicated to treat at this time to address above stated deficits and return patient to PLOF.        Goals  STG (6 weeks)  1. Patient will have reported 0/10 pain in R hip/LB at rest.   2. Improve patient's lumbar extension to 10 degrees for increased ability to take proper  strides during ambulation.  3. Increase patient's bilateral single leg balance to 3 seconds for increased stability on stairs.   LTG (12 weeks)  1. Patient's LE strength will be equal bilaterally for ability to ambulate and return to functional activities at [facility].   2. Patient will be able to complete sit to stand transfer with 0/10 pain in right groin.   3. Patient will be independent with home exercise program for continued maintenance post PT discharge.       Plan  Patient would benefit from: skilled physical therapy  Planned modality interventions: unattended electrical stimulation, thermotherapy: hydrocollator packs, cryotherapy and traction    Planned therapy interventions: manual therapy, neuromuscular re-education, self care, therapeutic activities, therapeutic exercise, home exercise program and gait training    Frequency: 1-2x week  Duration in weeks: 12  Plan of Care beginning date: 2025  Plan of Care expiration date: 2025  Treatment plan discussed with: patient and PTA  Plan details: Progress note in 4 weeks.         Subjective Evaluation    History of Present Illness  Mechanism of injury: Angelina Yanes is a 65 y.o. female who presents to outpatient Physical Therapy today with complaints of right groin pain. Previously sent to PT from sports medicine to attempt therapy on hip, was determined more of low back and was then referred to pain management. Injection in low back a few weeks ago with minimal changes. Was then sent for water therapy, but had difficulty attending due to schedule conflicts. Currently CC of right groin pain that increase with sit to stand transfers, leading with RLE up steps.      Patient Goals  Patient goals for therapy: decreased pain  Patient goal: increased energy, strengthen core, daily chores without pain or requiring assistance, walk her dog  Pain  Current pain ratin  At best pain ratin  At worst pain ratin  Location: R groin  Quality:  sharp  Alleviating factors: walking.  Exacerbated by: transfers.    Social Support  Steps to enter house: yes  Stairs in house: yes   Lives in: multiple-level home    Employment status: working ()        Objective     Concurrent Complaints  Negative for bladder dysfunction, bowel dysfunction and saddle (S4) numbness    Neurological Testing     Sensation     Lumbar   Left   Intact: light touch    Right   Intact: light touch    Reflexes   Left   Babinski sign: negative    Right   Babinski sign: negative    Active Range of Motion     Lumbar   Flexion:  WFL  Extension:  Restriction level: maximal  Left lateral flexion:  WFL  Right lateral flexion:  WFL  Left rotation:  Restriction level: minimal  Right rotation:  Restriction level: minimal  Left Hip   Extension: 0 degrees   External rotation (90/90): 15 degrees   Internal rotation (90/90): 30 degrees     Right Hip   Extension: 0 degrees   External rotation (90/90): 20 degrees   Internal rotation (90/90): 15 degrees   Mechanical Assessment    Cervical      Thoracic      Lumbar    Lying extension: repeated movements    Strength/Myotome Testing     Left Hip   Planes of Motion   Flexion: 4  Extension: 3+  Adduction: 5  External rotation: 5  Internal rotation: 5    Isolated Muscles   Gluteus tito: 3-    Right Hip   Planes of Motion   Flexion: 2+  Extension: 3-  Adduction: 5  External rotation: 4  Internal rotation: 4    Isolated Muscles   Gluteus maximums: 2+    Left Knee   Flexion: 5  Extension: 5    Right Knee   Flexion: 5  Extension: 4+    Left Ankle/Foot   Dorsiflexion: 5    Right Ankle/Foot   Dorsiflexion: 5    Muscle Activation     Additional Muscle Activation Details  Activation of TrA with resisted SLR R= trace L=min    Tests     Lumbar     Left   Negative passive SLR.     Right   Negative passive SLR and slump test.     Right Pelvic Girdle/Sacrum   Positive: active SLR test.     Additional Tests Details  Hamstring 90/90 SLR R=(50) L=(40)  (-)  supine to sit test  No change in symptoms with manual lumbar traction    (+) scour    (+) cassie    (+) ely's    General Comments:      Lumbar Comments  Gait: forward trunk lean, absent hip or lumbar extension. antalgic    FOTO: 33% function, 49% predicted function                Precautions: none noted   Access code: verbal  Progress note: 5/24  POC: 7/24    Manuals 4/24       Lumbar PA mobs Grade II-IV       R hip stretching nv                       Neuro Re-Ed        Core brace        Knee fall out        Supine march        SLR with core brace        Bird dog                Posture control with towel roll Patient ed       Ther Ex        Nustep        Standing lumbar extension 10x       PPU 10x       Bridge *       Prone lay *       Prone knee flexion *               LTR        Quadruped LTR        Supine ball squeeze        Supine clamshell                                Hip flexor stretch *               Ther Activity        Step ups        Sit to stand        Gait Training                        Modalities

## 2025-04-25 ENCOUNTER — TELEPHONE (OUTPATIENT)
Dept: PAIN MEDICINE | Facility: CLINIC | Age: 65
End: 2025-04-25

## 2025-04-25 ENCOUNTER — RESULTS FOLLOW-UP (OUTPATIENT)
Dept: FAMILY MEDICINE CLINIC | Facility: CLINIC | Age: 65
End: 2025-04-25

## 2025-04-25 NOTE — TELEPHONE ENCOUNTER
Caller: Patient    Doctor: Dr. BARAHONA    Reason for call: Pt reports 0 % improvement post injection  Pain level  8/10     Has sever pain in her groin  Went to PT yesterday, Helped a bit. But having a lot of pain now    Having hard time walking       Call back#:

## 2025-04-29 ENCOUNTER — OFFICE VISIT (OUTPATIENT)
Dept: PHYSICAL THERAPY | Facility: CLINIC | Age: 65
End: 2025-04-29
Payer: COMMERCIAL

## 2025-04-29 DIAGNOSIS — M79.605 BILATERAL LEG PAIN: ICD-10-CM

## 2025-04-29 DIAGNOSIS — G89.4 CHRONIC PAIN SYNDROME: ICD-10-CM

## 2025-04-29 DIAGNOSIS — M51.16 LUMBAR DISC DISEASE WITH RADICULOPATHY: ICD-10-CM

## 2025-04-29 DIAGNOSIS — R29.898 LEFT LEG WEAKNESS: Primary | ICD-10-CM

## 2025-04-29 DIAGNOSIS — M79.604 BILATERAL LEG PAIN: ICD-10-CM

## 2025-04-29 DIAGNOSIS — M25.551 RIGHT HIP PAIN: ICD-10-CM

## 2025-04-29 PROCEDURE — 97140 MANUAL THERAPY 1/> REGIONS: CPT | Performed by: PHYSICAL THERAPIST

## 2025-04-29 PROCEDURE — 97110 THERAPEUTIC EXERCISES: CPT | Performed by: PHYSICAL THERAPIST

## 2025-04-29 NOTE — TELEPHONE ENCOUNTER
Caller: Patient   Doctor/office: Dr Bowers   #: [phone]     % of improvement: No relief still has the leg and groin. Patient did attend PT and has it again today, she did some stretching and the next day at work when she put her leg down she got the groin pain again which affects her being able to put her heel down.     She has a f/u on May 8th.       Pain Scale (1-10): 8/9 for pain.

## 2025-04-29 NOTE — TELEPHONE ENCOUNTER
Caller: pt    Doctor: Dr. sanchez    Reason for call: pt states it is her R hip.    Call back#: [phone]

## 2025-04-29 NOTE — PROGRESS NOTES
Daily Note     Today's date: 2025  Patient name: Angelina Yanes  : 1960  MRN: [id number]  Referring provider: Cori Bowers MD  Dx:   Encounter Diagnosis     ICD-10-CM    1. Left leg weakness  R29.898       2. Lumbar disc disease with radiculopathy  M51.16       3. Chronic pain syndrome  G89.4       4. Bilateral leg pain  M79.604     M79.605       5. Right hip pain  M25.551           Start Time: 1515  Stop Time: 1605  Total time in clinic (min): 50 minutes    Subjective: Continues to have sharp groin type pain with sit to stand transitions that can cause leg to lock up and have difficulty standing up straight.       Objective: See treatment diary below      Assessment: Tolerated treatment well. Slight improvement post sit to stand transition at the end of the session with improved upright walking posture. Some groin irritation with bridge activity. Noted continued restriction of upper lumbar and thoracic PA mobility. No tenderness at piriformis jt. Consistent pain in groin, occasionally into anterior thigh. Patient exhibited good technique with therapeutic exercises and would benefit from continued PT      Plan: Continue per plan of care.  Progress treatment as tolerated.       Precautions: none noted   Access code: verbal  Progress note:   POC:     Manuals       Lumbar PA mobs Grade II-IV Grade II-IV      R hip stretching nv nv      Hip mobs  nv              Neuro Re-Ed        Core brace        GS  25x      Knee fall out        Supine march        SLR with core brace        Bird dog                Posture control with towel roll Patient ed       Ther Ex        Nustep  With towel roll L1 10 min      Standing lumbar extension 10x 10x // bars      PPU 10x 10x      Bridge * 10x      Prone lay * 5 min with MHP      Prone knee flexion * 20x              LTR        Quadruped LTR        Supine ball squeeze        Supine clamshell                                Hip flexor stretch *  At stairs :15x4              Ther Activity        Step ups        Sit to stand        Gait Training                        Modalities

## 2025-05-01 ENCOUNTER — TELEPHONE (OUTPATIENT)
Dept: PAIN MEDICINE | Facility: CLINIC | Age: 65
End: 2025-05-01

## 2025-05-02 DIAGNOSIS — M79.604 BILATERAL LEG PAIN: ICD-10-CM

## 2025-05-02 DIAGNOSIS — R29.898 LEFT LEG WEAKNESS: ICD-10-CM

## 2025-05-02 DIAGNOSIS — M51.16 LUMBAR DISC DISEASE WITH RADICULOPATHY: ICD-10-CM

## 2025-05-02 DIAGNOSIS — G89.4 CHRONIC PAIN SYNDROME: ICD-10-CM

## 2025-05-02 DIAGNOSIS — M79.605 BILATERAL LEG PAIN: ICD-10-CM

## 2025-05-05 RX ORDER — DULOXETIN HYDROCHLORIDE 30 MG/1
30 CAPSULE, DELAYED RELEASE ORAL DAILY
Qty: 30 CAPSULE | Refills: 1 | Status: SHIPPED | OUTPATIENT
Start: 2025-05-05 | End: 2025-05-08 | Stop reason: SDUPTHER

## 2025-05-06 ENCOUNTER — OFFICE VISIT (OUTPATIENT)
Dept: PHYSICAL THERAPY | Facility: CLINIC | Age: 65
End: 2025-05-06
Payer: COMMERCIAL

## 2025-05-06 DIAGNOSIS — M25.551 RIGHT HIP PAIN: ICD-10-CM

## 2025-05-06 DIAGNOSIS — R29.898 LEFT LEG WEAKNESS: Primary | ICD-10-CM

## 2025-05-06 DIAGNOSIS — M51.16 LUMBAR DISC DISEASE WITH RADICULOPATHY: ICD-10-CM

## 2025-05-06 DIAGNOSIS — M79.604 BILATERAL LEG PAIN: ICD-10-CM

## 2025-05-06 DIAGNOSIS — M79.605 BILATERAL LEG PAIN: ICD-10-CM

## 2025-05-06 DIAGNOSIS — G89.4 CHRONIC PAIN SYNDROME: ICD-10-CM

## 2025-05-06 PROCEDURE — 97110 THERAPEUTIC EXERCISES: CPT | Performed by: PHYSICAL THERAPIST

## 2025-05-06 PROCEDURE — 97140 MANUAL THERAPY 1/> REGIONS: CPT | Performed by: PHYSICAL THERAPIST

## 2025-05-06 NOTE — PROGRESS NOTES
"Daily Note     Today's date: 2025  Patient name: Angelina Yanes  : 1960  MRN: [id number]  Referring provider: Cori Bowers MD  Dx:   Encounter Diagnosis     ICD-10-CM    1. Left leg weakness  R29.898       2. Lumbar disc disease with radiculopathy  M51.16       3. Chronic pain syndrome  G89.4       4. Bilateral leg pain  M79.604     M79.605       5. Right hip pain  M25.551           Start Time: 1615  Stop Time: 1700  Total time in clinic (min): 45 minutes    Subjective: States she continues to have locking of right hip when getting out of a chair. 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       Objective: See treatment diary below      Assessment: Tolerated treatment well. Increased ease with transfer and walking post session today. Did note tightness of R quad and hip adductor which was added to MT today. Patient exhibited good technique with therapeutic exercises and would benefit from continued PT      Plan: Continue per plan of care.  Progress treatment as tolerated.       Precautions: none noted   Access code: verbal  Progress note:   POC:     Manuals      Lumbar PA mobs Grade II-IV Grade II-IV Grade II-IV     R hip stretching nv nv Quad, adductors     Hip mobs  nv Long axis distraction grade II-IV             Neuro Re-Ed        Core brace        GS  25x 20x     Knee fall out        Supine march        SLR with core brace        Bird dog                Posture control with towel roll Patient ed       Ther Ex        Nustep  With towel roll L1 10 min With towel roll L1 10 min     Standing lumbar extension 10x 10x // bars 10x // bars     PPU 10x 10x 10x     Bridge * 10x 10x     Prone lay * 5 min with MHP Progressive with MHP 3 stages 2 min     Prone knee flexion * 20x 20x             LTR        Quadruped LTR        Supine ball squeeze        Supine clamshell                                Hip flexor stretch * At stairs :15x4 :15x4 8\"           "   Ther Activity        Step ups        Sit to stand        Gait Training                        Modalities

## 2025-05-08 ENCOUNTER — OFFICE VISIT (OUTPATIENT)
Age: 65
End: 2025-05-08
Payer: COMMERCIAL

## 2025-05-08 VITALS — BODY MASS INDEX: 28 KG/M2 | HEIGHT: 63 IN | WEIGHT: 158 LBS

## 2025-05-08 DIAGNOSIS — R29.898 LEFT LEG WEAKNESS: ICD-10-CM

## 2025-05-08 DIAGNOSIS — Z79.891 LONG-TERM CURRENT USE OF OPIATE ANALGESIC: Primary | ICD-10-CM

## 2025-05-08 DIAGNOSIS — F11.20 UNCOMPLICATED OPIOID DEPENDENCE (HCC): ICD-10-CM

## 2025-05-08 DIAGNOSIS — G89.4 CHRONIC PAIN SYNDROME: ICD-10-CM

## 2025-05-08 DIAGNOSIS — M79.604 BILATERAL LEG PAIN: ICD-10-CM

## 2025-05-08 DIAGNOSIS — M79.605 BILATERAL LEG PAIN: ICD-10-CM

## 2025-05-08 DIAGNOSIS — M51.16 LUMBAR DISC DISEASE WITH RADICULOPATHY: ICD-10-CM

## 2025-05-08 PROCEDURE — 99214 OFFICE O/P EST MOD 30 MIN: CPT | Performed by: ANESTHESIOLOGY

## 2025-05-08 RX ORDER — DULOXETIN HYDROCHLORIDE 30 MG/1
30 CAPSULE, DELAYED RELEASE ORAL DAILY
Qty: 30 CAPSULE | Refills: 1 | Status: SHIPPED | OUTPATIENT
Start: 2025-05-08

## 2025-05-08 NOTE — PATIENT INSTRUCTIONS
"Patient Education     Taking opioids safely   The Basics   Written by the doctors and editors at [facility]   What are opioids? -- Opioids are a group of prescription medicines that relieve pain. They work by attaching to \"opioid receptors\" in the body and blocking pain signals.  Opioids are sometimes used when other types of pain medicine do not help enough. Opioids can be helpful for treating short-term, or \"acute,\" pain, like after surgery or an injury. They are also sometimes used to treat long-term, or \"chronic,\" pain, like for people with cancer. But they come with risks.  If your doctor prescribes an opioid medicine, it's important to understand the risks and know how to stay safe.  What are the risks of taking opioids? -- You should know that:   Opioids have side effects. Some are just bothersome, and some can be dangerous. For example, taking too much of an opioid is called an \"overdose.\" An overdose can cause serious problems and even death.   In some cases, taking opioids can lead to misuse. For example, people might take the medicine when they don't need it for pain. Or they might take more than they are supposed to. Sharing or selling opioids are other examples of misuse.   There is a risk of addiction. This is also called \"opioid use disorder.\"  If you take too much, or take opioids with alcohol or certain other drugs, it can cause serious harm. It can even cause death from overdose.  How do I stay safe? -- There are things you can do to stay safe if you need to take an opioid medicine (figure 1). These things help protect yourself and others.  Know your medicines:    Opioids come in different forms. \"Immediate-release\" medicines work quickly and last for a short time. \"Extended-release\" medicines work more slowly and last longer. Make sure that you know what type of opioid you have. Read the label and the information that comes with your prescription.   Follow your treatment plan carefully. Take only " "the dose your doctor prescribes, and only as often as they tell you to.   Never take opioids that were not prescribed to you.   Some opioids come combined with other medicines like acetaminophen or an \"NSAID\" (like ibuprofen). Do not take any extra NSAIDs or acetaminophen without talking to your doctor first.   Make sure that all of your doctors know every medicine you take, even those that are non-prescription. Some medicines can affect the way opioids work. Bring a complete list of all of your pain medicines and other medicines with you whenever you go to a doctor, nurse, dentist, or pharmacist.   Ask your doctor or pharmacist if it is safe to take your other medicines with your opioid medicine.  Use and store your medicine safely:    Do not drink alcohol while you are taking opioids.   Do not take opioids with medicines that make you sleepy, unless your doctor tells you to. Examples include:   \"Benzodiazepines\" like diazepam (sample brand name: Valium) or alprazolam (sample brand name: Xanax)   Gabapentin (sample brand name: Neurontin) or pregabalin (brand name: Lyrica)   Muscle relaxants like baclofen or cyclobenzaprine   Sleeping pills like zolpidem (sample brand name: Ambien)   Talk to your doctor about whether it is safe to drive. Opioids can make you feel tired or have trouble thinking clearly. If you are starting a new prescription or taking a higher dose, you might need to avoid things like driving, using dangerous machinery, or other activities that could be risky.   Store your opioids in a safe place, such as a locked cabinet. This prevents children, teens, or anyone else from getting to them.   Never share your opioids with other people.  Be aware of side effects:    Opioid medicines can cause side effects. There are often ways to prevent or treat these.   Call your doctor or nurse if you have side effects that bother you, such as:   Constipation - Your doctor or nurse might suggest that you take a " "laxative to prevent or treat constipation. If your bowel movements are hard and dry, a stool softener might help. Drink plenty of water, and try to get regular physical activity.   Mild nausea or stomach discomfort - Taking the medicine with or after food can help with this. Nausea usually gets better with time.   Severe nausea, vomiting, or itchiness - If you have any of these problems, your doctor might be able to switch you to a different medicine.   Dry mouth   Feeling dizzy or sleepy, or having trouble thinking clearly   Vision problems   Being clumsy or falling down   Know the signs of an opioid overdose. Get help right away if you think that you or someone else took too much of an opioid medicine. Signs of an overdose are listed below.  Stay safe when stopping your opioid medicine:    When opioids are needed to treat acute pain, doctors usually try to prescribe them for only a short time. This usually means a few days or a week. They also prescribe the lowest dose possible to relieve pain.   Follow your doctor's instructions about how to stop taking your opioid once your pain has improved. This usually involves \"tapering,\" or reducing the dose gradually. If you stop an opioid suddenly, this can cause unpleasant symptoms like stomach ache, diarrhea, or shakes. This is called \"withdrawal.\" Tapering the dose can help prevent withdrawal.   When your pain gets better, get rid of any leftover medicines. Your doctor, nurse, or pharmacist can suggest ways to get rid of them. This might involve flushing them down the toilet or mixing them with something like dirt or cat litter, then putting the mixture in a sealed container in the trash. Some police stations and pharmacies also take leftover medicines.  What is naloxone? -- Naloxone is a medicine that reverses the effects of opioids. It can prevent death from an opioid overdose. Naloxone comes as an injection (shot), or as a spray that goes in the nose. Naloxone nasal " spray (brand name: Narcan) is available without a prescription.  If you or someone you know uses opioids, it's a good idea to keep naloxone with you. Make sure that you and your family and friends know how and when to use it.  When should I call for help? -- If you are taking an opioid, it's important to know when to get help. Signs of an opioid overdose include:   Extreme sleepiness   Slow breathing, or no breathing at all   Very small pupils (the black circles in the center of the eyes)   Very slow heartbeat  If you took too much of your opioid medicine or think that someone is having an opioid overdose:   If you have naloxone, give it immediately. Naloxone can save a person's life. But it needs to be given as soon as possible.   Call for an ambulance right away (in the [place] and [place], call 9-1-1).  Call your doctor or nurse if:   You are having side effects that bother you.   You have questions about how to take your medicine.   You are having trouble managing your pain.  All topics are updated as new evidence becomes available and our peer review process is complete.  This topic retrieved from [employer] on: May 15, 2024.  Topic 728559 Version 1.0  Release: 32.4.3 - C32.134  © 2024 UpToDate, Inc. and/or its affiliates. All rights reserved.  figure 1: Tips for medication safety     Tips to use your medicine safely include:  (A) Read labels so you know how to take your medicines.  (B) Have a routine for taking your medicines.  (C) Make sure you know what foods, drinks, and other medicines are safe for you.  (D) Store medicines in a safe place.  (E) Work with your health care team and ask questions if you have them.  Graphic 314315 Version 2.0  Consumer Information Use and Disclaimer   Disclaimer: This generalized information is a limited summary of diagnosis, treatment, and/or medication information. It is not meant to be comprehensive and should be used as a tool to help the user understand and/or assess potential  diagnostic and treatment options. It does NOT include all information about conditions, treatments, medications, side effects, or risks that may apply to a specific patient. It is not intended to be medical advice or a substitute for the medical advice, diagnosis, or treatment of a health care provider based on the health care provider's examination and assessment of a patient's specific and unique circumstances. Patients must speak with a health care provider for complete information about their health, medical questions, and treatment options, including any risks or benefits regarding use of medications. This information does not endorse any treatments or medications as safe, effective, or approved for treating a specific patient. UpToDate, Inc. and its affiliates disclaim any warranty or liability relating to this information or the use thereof.The use of this information is governed by the Terms of Use, available at https://www.[employer].com/en/know/clinical-effectiveness-terms. 2024© UpToDate, Inc. and its affiliates and/or licensors. All rights reserved.  Copyright   © 2024 UpToDate, Inc. and/or its affiliates. All rights reserved.

## 2025-05-08 NOTE — H&P (VIEW-ONLY)
Assessment:  1. Long-term current use of opiate analgesic    2. Left leg weakness    3. Lumbar disc disease with radiculopathy    4. Chronic pain syndrome    5. Bilateral leg pain    6. Uncomplicated opioid dependence (HCC)        Plan:  Patient is a 65-year-old female with complaints of low back pain, left leg pain and left rhomboid pain with chronic pain some secondary lumbar degenerative disease, lumbar radiculopathy presents to office for follow-up visit.  Patient is currently in physical therapy however is difficult for her to participate secondary to exacerbation of right hip pain.  1.  We will proceed with right hip intra-articular steroid injection  2.  Patient will resume physical therapy after she gets relief from hip injection  3. We will obtain UDS  4.  We will start patient on tramadol at next office visit  5.  Follow-up in 1 month    Obtain an oral swab    Complete risks and benefits including bleeding, infection, tissue reaction, nerve injury and allergic reaction were discussed. The approach was demonstrated using models and literature was provided. Verbal and written consent was obtained.    There are risks associated with opioid medications, including dependence, addiction and tolerance. The patient understands and agrees to use these medications only as prescribed. Potential side effects of the medications include, but are not limited to, constipation, drowsiness, addiction, impaired judgment and risk of fatal overdose if not taken as prescribed. The patient was warned against driving while taking sedation medications.  Sharing medications is a felony. At this point in time, the patient is showing no signs of addiction, abuse, diversion or suicidal ideation.    A urine drug screen was collected at today's office visit as part of our medication management protocol. The point of care testing results were appropriate for what was being prescribed. The specimen will be sent for confirmatory testing.  The drug screen is medically necessary because the patient is either dependent on opioid medication or is being considered for opioid medication therapy and the results could impact ongoing or future treatment. The drug screen is to evaluate for the presences or absence of prescribed, non-prescribed, and/or illicit drugs/substances.    [place] Prescription Drug Monitoring Program report was reviewed and was appropriate       History of Present Illness:  The patient is a 65 y.o. female who presents for a follow up office visit in regards to Hip Pain.   The patient’s current symptoms include 9/10 constant intermittent sharp, stabbing, throbbing pain without any particular time pattern.     Current pain medications includes:  cymbalta 30mg po QHS.  The patient reports that this regimen is providing 30% pain relief.  The patient is reporting no side effects from this pain medication regimen.    I have personally reviewed and/or updated the patient's past medical history, past surgical history, family history, social history, current medications, allergies, and vital signs today.         Review of Systems  Review of Systems   Constitutional:  Negative for unexpected weight change.   HENT:  Negative for hearing loss.    Eyes:  Negative for visual disturbance.   Respiratory:  Negative for shortness of breath.    Cardiovascular:  Negative for leg swelling.   Gastrointestinal:  Negative for constipation.   Endocrine: Negative for polyuria.   Genitourinary:  Negative for difficulty urinating.   Musculoskeletal:  Positive for gait problem and myalgias. Negative for joint swelling.   Skin:  Negative for rash.   Neurological:  Negative for weakness and headaches.   Psychiatric/Behavioral:  Negative for decreased concentration.    All other systems reviewed and are negative.      Past Medical History:   Diagnosis Date    Disease of thyroid gland     Genital herpes     Hypertension        Past Surgical History:   Procedure  Laterality Date    APPENDECTOMY      CARPAL TUNNEL RELEASE      COLONOSCOPY      CYSTOSCOPY      TONSILLECTOMY         Family History   Problem Relation Age of Onset    Heart disease Mother     Diabetes Mother     Heart disease Father     No Known Problems Sister     No Known Problems Maternal Grandmother     No Known Problems Maternal Grandfather     No Known Problems Paternal Grandmother     No Known Problems Paternal Grandfather     No Known Problems Maternal Aunt     No Known Problems Maternal Aunt     Breast cancer Other        Social History     Occupational History    Occupation:    Tobacco Use    Smoking status: Former     Current packs/day: 0.00     Types: Cigarettes     Quit date: 1988     Years since quittin.7    Smokeless tobacco: Never   Vaping Use    Vaping status: Never Used   Substance and Sexual Activity    Alcohol use: No    Drug use: No    Sexual activity: Not Currently         Current Outpatient Medications:     ascorbic acid (VITAMIN C) 500 MG tablet, Take 500 mg by mouth daily, Disp: , Rfl:     calcium citrate-vitamin D 315 mg-5 mcg tablet, Take 1 tablet by mouth 2 (two) times a day, Disp: , Rfl:     DULoxetine (CYMBALTA) 30 mg delayed release capsule, take 1 capsule by mouth once daily, Disp: 30 capsule, Rfl: 1    Garlic 400 MG TBEC, Take by mouth, Disp: , Rfl:     levothyroxine 50 mcg tablet, Take 1 tablet (50 mcg total) by mouth daily, Disp: 90 tablet, Rfl: 1    lisinopril (ZESTRIL) 10 mg tablet, take 1 tablet by mouth once daily, Disp: 90 tablet, Rfl: 1    Multiple Vitamins-Minerals (CENTRUM SILVER WOMEN 50+ PO), , Disp: , Rfl:     ofloxacin (OCUFLOX) 0.3 % ophthalmic solution, Apply 2 drops in the left eye every 2 hours while awake for 2 days, THEN 2 drops in the left eye 4 times daily for 5 days, Disp: 5 mL, Rfl: 0    Restasis 0.05 % ophthalmic emulsion, INSTILL 1 DROP INTO EACH EYE EVERY 12 HOURS, Disp: , Rfl:     vitamin E, tocopherol, 1,000 units capsule, Take  "1,000 Units by mouth daily, Disp: , Rfl:     No Known Allergies    Physical Exam:    Ht 5' 3\" (1.6 m)   Wt 71.7 kg (158 lb)   BMI 27.99 kg/m²     Constitutional:normal, well developed, well nourished, alert, in no distress and non-toxic and no overt pain behavior.  Eyes:anicteric  HEENT:grossly intact  Neck:supple, symmetric, trachea midline and no masses   Pulmonary:even and unlabored  Cardiovascular:No edema or pitting edema present  Skin:Normal without rashes or lesions and well hydrated  Psychiatric:Mood and affect appropriate  Neurologic:Cranial Nerves II-XII grossly intact  Musculoskeletal:normal and antalgic    Imaging  No orders to display       No orders of the defined types were placed in this encounter.        "

## 2025-05-13 ENCOUNTER — APPOINTMENT (OUTPATIENT)
Dept: PHYSICAL THERAPY | Facility: CLINIC | Age: 65
End: 2025-05-13
Payer: COMMERCIAL

## 2025-05-13 ENCOUNTER — HOSPITAL ENCOUNTER (OUTPATIENT)
Dept: RADIOLOGY | Facility: HOSPITAL | Age: 65
Discharge: HOME | End: 2025-05-13
Attending: ANESTHESIOLOGY | Admitting: ANESTHESIOLOGY
Payer: COMMERCIAL

## 2025-05-13 VITALS
OXYGEN SATURATION: 97 % | RESPIRATION RATE: 18 BRPM | TEMPERATURE: 97 F | DIASTOLIC BLOOD PRESSURE: 84 MMHG | SYSTOLIC BLOOD PRESSURE: 130 MMHG | HEART RATE: 90 BPM

## 2025-05-13 DIAGNOSIS — M25.551 RIGHT HIP PAIN: ICD-10-CM

## 2025-05-13 PROCEDURE — 77002 NEEDLE LOCALIZATION BY XRAY: CPT | Performed by: ANESTHESIOLOGY

## 2025-05-13 PROCEDURE — NC001 PR NO CHARGE: Performed by: ANESTHESIOLOGY

## 2025-05-13 PROCEDURE — 20610 DRAIN/INJ JOINT/BURSA W/O US: CPT | Performed by: ANESTHESIOLOGY

## 2025-05-13 PROCEDURE — 77002 NEEDLE LOCALIZATION BY XRAY: CPT

## 2025-05-13 RX ORDER — ROPIVACAINE HYDROCHLORIDE 2 MG/ML
1 INJECTION, SOLUTION EPIDURAL; INFILTRATION; PERINEURAL ONCE
Status: COMPLETED | OUTPATIENT
Start: 2025-05-13 | End: 2025-05-13

## 2025-05-13 RX ORDER — METHYLPREDNISOLONE ACETATE 40 MG/ML
40 INJECTION, SUSPENSION INTRA-ARTICULAR; INTRALESIONAL; INTRAMUSCULAR; PARENTERAL; SOFT TISSUE ONCE
Status: COMPLETED | OUTPATIENT
Start: 2025-05-13 | End: 2025-05-13

## 2025-05-13 RX ADMIN — ROPIVACAINE HYDROCHLORIDE 1 ML: 2 INJECTION, SOLUTION EPIDURAL; INFILTRATION; PERINEURAL at 08:22

## 2025-05-13 RX ADMIN — IOHEXOL 1 ML: 300 INJECTION, SOLUTION INTRAVENOUS at 08:22

## 2025-05-13 RX ADMIN — METHYLPREDNISOLONE ACETATE 40 MG: 40 INJECTION, SUSPENSION INTRA-ARTICULAR; INTRALESIONAL; INTRAMUSCULAR; PARENTERAL; SOFT TISSUE at 08:22

## 2025-05-13 NOTE — DISCHARGE INSTR - LAB

## 2025-05-13 NOTE — INTERVAL H&P NOTE
Update: (This section must be completed if the H&P was completed greater than 24 hrs to procedure or admission)    H&P reviewed. After examining the patient, I find no changed to the H&P since it had been written.    Patient re-evaluated. Accept as history and physical.    Cori Bowers MD/May 13, 2025/8:05 AM

## 2025-05-15 LAB
6MAM UR QL CFM: NEGATIVE NG/ML
7AMINOCLONAZEPAM UR QL CFM: NEGATIVE NG/ML
A-OH ALPRAZ UR QL CFM: NEGATIVE NG/ML
ACCEPTABLE CREAT UR QL: NORMAL MG/DL
ACCEPTIBLE SP GR UR QL: NORMAL
AMPHET UR QL CFM: NEGATIVE NG/ML
AMPHET UR QL CFM: NEGATIVE NG/ML
BUPRENORPHINE UR QL CFM: NEGATIVE NG/ML
BUTALBITAL UR QL CFM: NEGATIVE NG/ML
BZE UR QL CFM: NEGATIVE NG/ML
CODEINE UR QL CFM: NEGATIVE NG/ML
DESIPRAMINE UR QL CFM: NEGATIVE NG/ML
EDDP UR QL CFM: NEGATIVE NG/ML
ETHYL GLUCURONIDE UR QL CFM: NEGATIVE NG/ML
ETHYL SULFATE UR QL SCN: NEGATIVE NG/ML
FENTANYL UR QL CFM: NEGATIVE NG/ML
GLIADIN IGG SER IA-ACNC: NEGATIVE NG/ML
GLUCOSE 30M P 50 G LAC PO SERPL-MCNC: NEGATIVE NG/ML
HYDROCODONE UR QL CFM: NEGATIVE NG/ML
HYDROCODONE UR QL CFM: NEGATIVE NG/ML
HYDROMORPHONE UR QL CFM: NEGATIVE NG/ML
LORAZEPAM UR QL CFM: NEGATIVE NG/ML
MDMA UR QL CFM: NEGATIVE NG/ML
ME-PHENIDATE UR QL CFM: NEGATIVE NG/ML
MEPERIDINE UR QL CFM: NEGATIVE NG/ML
METHADONE UR QL CFM: NEGATIVE NG/ML
METHAMPHET UR QL CFM: NEGATIVE NG/ML
MORPHINE UR QL CFM: NEGATIVE NG/ML
MORPHINE UR QL CFM: NEGATIVE NG/ML
NITRITE UR QL: ABNORMAL UG/ML
NORBUPRENORPHINE UR QL CFM: NEGATIVE NG/ML
NORDIAZEPAM UR QL CFM: NEGATIVE NG/ML
NORFENTANYL UR QL CFM: NEGATIVE NG/ML
NORHYDROCODONE UR QL CFM: NEGATIVE NG/ML
NORHYDROCODONE UR QL CFM: NEGATIVE NG/ML
NORMEPERIDINE UR QL CFM: NEGATIVE NG/ML
NOROXYCODONE UR QL CFM: NEGATIVE NG/ML
OLANZAPINE QUANTIFICATION: NEGATIVE NG/ML
OPC-3373 QUANTIFICATION: NEGATIVE NG/ML
OXAZEPAM UR QL CFM: NEGATIVE NG/ML
OXYCODONE UR QL CFM: NEGATIVE NG/ML
OXYMORPHONE UR QL CFM: NEGATIVE NG/ML
OXYMORPHONE UR QL CFM: NEGATIVE NG/ML
PARA-FLUOROFENTANYL QUANTIFICATION: NORMAL NG/ML
PCP UR QL CFM: NEGATIVE NG/ML
PHENOBARB UR QL CFM: NEGATIVE NG/ML
RESULT ALL_PRESCRIBED MEDS AND SPECIAL INSTRUCTIONS: NORMAL
SECOBARBITAL UR QL CFM: NEGATIVE NG/ML
SL AMB 7-OH-MITRAGYNINE (KRATOM ALKALOID) QUANTIFICATION: NEGATIVE NG/ML
SL AMB ACETYL FENTANYL QUANTIFICATION: NORMAL NG/ML
SL AMB ACETYL NORFENTANYL QUANTIFICATION: NORMAL NG/ML
SL AMB ACRYL FENTANYL QUANTIFICATION: NORMAL NG/ML
SL AMB CARFENTANIL QUANTIFICATION: NORMAL NG/ML
SL AMB CLOZAPINE QUANTIFICATION: NEGATIVE NG/ML
SL AMB CTHC (MARIJUANA METABOLITE) QUANTIFICATION: NEGATIVE NG/ML
SL AMB DEXTRORPHAN (DEXTROMETHORPHAN METABOLITE) QUANT: NEGATIVE NG/ML
SL AMB HALOPERIDOL  QUANTIFICATION: NEGATIVE NG/ML
SL AMB HALOPERIDOL METABOLITE QUANTIFICATION: NEGATIVE NG/ML
SL AMB HYDROXYRISPERIDONE QUANTIFICATION: NEGATIVE NG/ML
SL AMB N-DESMETHYL-TRAMADOL QUANTIFICATION: NEGATIVE NG/ML
SL AMB N-DESMETHYLCLOZAPINE QUANTIFICATION: NEGATIVE NG/ML
SL AMB NORQUETIAPINE QUANTIFICATION: NEGATIVE NG/ML
SL AMB PHENTERMINE QUANTIFICATION: NEGATIVE NG/ML
SL AMB PREGABALIN QUANTIFICATION: NEGATIVE NG/ML
SL AMB QUETIAPINE QUANTIFICATION: NEGATIVE NG/ML
SL AMB RISPERIDONE QUANTIFICATION: NEGATIVE NG/ML
SL AMB RITALINIC ACID QUANTIFICATION: NEGATIVE NG/ML
SPECIMEN PH ACCEPTABLE UR: NORMAL
TAPENTADOL UR QL CFM: NEGATIVE NG/ML
TEMAZEPAM UR QL CFM: NEGATIVE NG/ML
TEMAZEPAM UR QL CFM: NEGATIVE NG/ML
TRAMADOL UR QL CFM: NEGATIVE NG/ML
URATE/CREAT 24H UR: NEGATIVE NG/ML

## 2025-05-20 ENCOUNTER — APPOINTMENT (OUTPATIENT)
Dept: PHYSICAL THERAPY | Facility: CLINIC | Age: 65
End: 2025-05-20
Payer: COMMERCIAL

## 2025-05-27 ENCOUNTER — APPOINTMENT (OUTPATIENT)
Dept: PHYSICAL THERAPY | Facility: CLINIC | Age: 65
End: 2025-05-27
Payer: COMMERCIAL

## 2025-05-29 ENCOUNTER — OFFICE VISIT (OUTPATIENT)
Age: 65
End: 2025-05-29
Payer: COMMERCIAL

## 2025-05-29 VITALS — BODY MASS INDEX: 28 KG/M2 | WEIGHT: 158 LBS | HEIGHT: 63 IN

## 2025-05-29 DIAGNOSIS — G89.4 CHRONIC PAIN SYNDROME: Primary | ICD-10-CM

## 2025-05-29 DIAGNOSIS — M25.551 RIGHT HIP PAIN: ICD-10-CM

## 2025-05-29 DIAGNOSIS — M51.16 LUMBAR DISC DISEASE WITH RADICULOPATHY: ICD-10-CM

## 2025-05-29 DIAGNOSIS — M79.605 BILATERAL LEG PAIN: ICD-10-CM

## 2025-05-29 DIAGNOSIS — M79.604 BILATERAL LEG PAIN: ICD-10-CM

## 2025-05-29 DIAGNOSIS — R29.898 LEFT LEG WEAKNESS: ICD-10-CM

## 2025-05-29 PROCEDURE — 99214 OFFICE O/P EST MOD 30 MIN: CPT | Performed by: NURSE PRACTITIONER

## 2025-05-29 RX ORDER — DULOXETIN HYDROCHLORIDE 30 MG/1
30 CAPSULE, DELAYED RELEASE ORAL DAILY
Qty: 30 CAPSULE | Refills: 2 | Status: SHIPPED | OUTPATIENT
Start: 2025-05-29

## 2025-05-29 NOTE — PROGRESS NOTES
Assessment:  1. Chronic pain syndrome    2. Lumbar disc disease with radiculopathy    3. Right hip pain    4. Left leg weakness    5. Bilateral leg pain        Plan:  While the patient was in the office today, I did have a thorough conversation regarding their chronic pain syndrome, medication management, and treatment plan options.  Is being seen for a follow-up visit.  She recently underwent on ultrasound-guided right hip joint injection on 5/13/2025.  She underwent an L4-5 interlaminar epidural steroid injection on 4/11/2025.  She denies much improvement from the epidural injection, but is reporting about 90% improvement in the right groin pain.  She still has minimal, intermittent pain on the lateral aspect of her right hip.    Start physical therapy.    Continue to use ibuprofen as needed.    Follow-up in 8 weeks.      My impressions and treatment recommendations were discussed in detail with the patient who verbalized understanding and had no further questions.  Discharge instructions were provided. I personally saw and examined the patient and I agree with the above discussed plan of care.    Orders Placed This Encounter   Procedures    Ambulatory Referral to Physical Therapy     Standing Status:   Future     Expiration Date:   5/29/2026     Referral Priority:   Routine     Referral Type:   Physical Therapy     Referral Reason:   Specialty Services Required     Requested Specialty:   Physical Therapy     Number of Visits Requested:   1     Expiration Date:   5/29/2026     New Medications Ordered This Visit   Medications    DULoxetine (CYMBALTA) 30 mg delayed release capsule     Sig: Take 1 capsule (30 mg total) by mouth in the morning     Dispense:  30 capsule     Refill:  2       History of Present Illness:  Angelina Yanes is a 65 y.o. female who presents for a follow up office visit in regards to Hip Pain (B/L hip pain ).   The patient’s current symptoms include complaints of pain on the lateral aspect  of the right hip.  Current pain level is a 5/10.  She is reporting almost complete resolution of the right groin pain following recent right hip joint injection.  She uses ibuprofen on an as needed basis which is helpful.      I have personally reviewed and/or updated the patient's past medical history, past surgical history, family history, social history, current medications, allergies, and vital signs today.     Review of Systems   Musculoskeletal:         - Muscle weakness  - Pain in extremity (In hip area not groin)   All other systems reviewed and are negative.      Problem List[1]    Past Medical History[2]    Past Surgical History[3]    Family History[4]    Social History     Occupational History    Occupation:    Tobacco Use    Smoking status: Former     Current packs/day: 0.00     Types: Cigarettes     Quit date: 1988     Years since quittin.8    Smokeless tobacco: Never   Vaping Use    Vaping status: Never Used   Substance and Sexual Activity    Alcohol use: No    Drug use: No    Sexual activity: Not Currently       Current Outpatient Medications on File Prior to Visit   Medication Sig    ascorbic acid (VITAMIN C) 500 MG tablet Take 500 mg by mouth in the morning.    calcium citrate-vitamin D 315 mg-5 mcg tablet Take 1 tablet by mouth in the morning and 1 tablet in the evening.    Garlic 400 MG TBEC Take by mouth    levothyroxine 50 mcg tablet Take 1 tablet (50 mcg total) by mouth daily    lisinopril (ZESTRIL) 10 mg tablet take 1 tablet by mouth once daily    Multiple Vitamins-Minerals (CENTRUM SILVER WOMEN 50+ PO)     ofloxacin (OCUFLOX) 0.3 % ophthalmic solution Apply 2 drops in the left eye every 2 hours while awake for 2 days, THEN 2 drops in the left eye 4 times daily for 5 days    Restasis 0.05 % ophthalmic emulsion     vitamin E, tocopherol, 1,000 units capsule Take 1,000 Units by mouth in the morning.    [DISCONTINUED] DULoxetine (CYMBALTA) 30 mg delayed release capsule Take 1  "capsule (30 mg total) by mouth in the morning     No current facility-administered medications on file prior to visit.       Allergies[5]    Physical Exam:    Ht 5' 3\" (1.6 m)   Wt 71.7 kg (158 lb)   BMI 27.99 kg/m²     Constitutional:normal, well developed, well nourished, alert, in no distress and non-toxic and no overt pain behavior.  Eyes:anicteric  HEENT:grossly intact  Neck:supple, symmetric, trachea midline and no masses   Pulmonary:even and unlabored  Cardiovascular:No edema or pitting edema present  Skin:Normal without rashes or lesions and well hydrated  Psychiatric:Mood and affect appropriate  Neurologic:Cranial Nerves II-XII grossly intact  Musculoskeletal:Tenderness over the right tensor fascia augustine.  Mild tenderness over the right greater trochanteric bursa.    Imaging         [1]   Patient Active Problem List  Diagnosis    Hypertension    Hypothyroidism    Breast cancer screening by mammogram    Genital herpes    Stress reaction    Seborrheic keratosis    Inflamed seborrheic keratosis    Left leg weakness    Chronic pain syndrome    Lumbar disc disease with radiculopathy    Right hip pain   [2]   Past Medical History:  Diagnosis Date    Disease of thyroid gland     Genital herpes     Hypertension    [3]   Past Surgical History:  Procedure Laterality Date    APPENDECTOMY      CARPAL TUNNEL RELEASE      COLONOSCOPY      CYSTOSCOPY      TONSILLECTOMY     [4]   Family History  Problem Relation Name Age of Onset    Heart disease Mother      Diabetes Mother      Heart disease Father      No Known Problems Sister wander     No Known Problems Maternal Grandmother      No Known Problems Maternal Grandfather      No Known Problems Paternal Grandmother      No Known Problems Paternal Grandfather      No Known Problems Maternal Aunt      No Known Problems Maternal Aunt      Breast cancer Other great pat. grandmother    [5] No Known Allergies    "

## 2025-06-19 ENCOUNTER — EVALUATION (OUTPATIENT)
Dept: PHYSICAL THERAPY | Facility: CLINIC | Age: 65
End: 2025-06-19
Attending: NURSE PRACTITIONER
Payer: COMMERCIAL

## 2025-06-19 DIAGNOSIS — G89.4 CHRONIC PAIN SYNDROME: Primary | ICD-10-CM

## 2025-06-19 DIAGNOSIS — M51.16 LUMBAR DISC DISEASE WITH RADICULOPATHY: ICD-10-CM

## 2025-06-19 DIAGNOSIS — M25.551 RIGHT HIP PAIN: ICD-10-CM

## 2025-06-19 PROCEDURE — 97110 THERAPEUTIC EXERCISES: CPT | Performed by: PHYSICAL THERAPIST

## 2025-06-19 PROCEDURE — 97112 NEUROMUSCULAR REEDUCATION: CPT | Performed by: PHYSICAL THERAPIST

## 2025-06-19 PROCEDURE — 97164 PT RE-EVAL EST PLAN CARE: CPT | Performed by: PHYSICAL THERAPIST

## 2025-06-19 NOTE — PROGRESS NOTES
PT Re-Evaluation     Today's date: 2025  Patient name: Angelina Yanes  : 1960  MRN: [id number]  Referring provider: Kocher, Barbara, CRNP  Dx:   Encounter Diagnosis     ICD-10-CM    1. Chronic pain syndrome  G89.4 Ambulatory Referral to Physical Therapy      2. Lumbar disc disease with radiculopathy  M51.16 Ambulatory Referral to Physical Therapy      3. Right hip pain  M25.551 Ambulatory Referral to Physical Therapy          Start Time: 945  Stop Time:   Total time in clinic (min): 40 minutes    Assessment  Impairments: abnormal gait, abnormal muscle firing, abnormal muscle tone, abnormal or restricted ROM, abnormal movement, activity intolerance, impaired balance, impaired physical strength, lacks appropriate home exercise program, pain with function, poor posture  and poor body mechanics  Functional limitations: lifting, pushing, pulling, bending, twisting, walking, stairs    Assessment details: Angelina Yanes was seen for an initial PT evaluation and 3 f/u sessions. Patient is a 65 y.o. female with diagnosis of lumbar radiculopathy and past medical history significant for appendectomy, HTN, hypothyroidism. FOTO functional score shows improvement from 33% at intake to 49% today surpassing predicted value of 44%. Findings of examination today show significant improvement towards goals with decreased pain intensity and occurrence, increased LE strength and functional mobility. Did note continued weakness of core stabilizers and gluteals with limited lumbar extension possibly contributing to current symptoms. Patient was given updated home exercise program and with plan to continue 1x/week for the next 4 weeks until next re-evaluation.          Goals  STG (6 weeks)  1. Patient will have reported 0/10 pain in R hip/LB at rest. - PROGRESSING  2. Improve patient's lumbar extension to 10 degrees for increased ability to take proper strides during ambulation. - PROGRESSING  3. Increase patient's  bilateral single leg balance to 3 seconds for increased stability on stairs. - NT  LTG (12 weeks)  1. Patient's LE strength will be equal bilaterally for ability to ambulate and return to functional activities at [facility]. - PROGRESSING  2. Patient will be able to complete sit to stand transfer with 0/10 pain in right groin. - PROGRESSING  3. Patient will be independent with home exercise program for continued maintenance post PT discharge. - PROGRESSING      Plan  Patient would benefit from: skilled physical therapy  Planned modality interventions: unattended electrical stimulation, thermotherapy: hydrocollator packs, cryotherapy and traction    Planned therapy interventions: manual therapy, neuromuscular re-education, self care, therapeutic activities, therapeutic exercise, home exercise program and gait training    Frequency: 1-2x week  Duration in weeks: 12  Plan of Care beginning date: 4/24/2025  Plan of Care expiration date: 9/19/2025  Treatment plan discussed with: patient and PTA  Plan details: Progress note in 4 weeks.         Subjective Evaluation    History of Present Illness  Subjective 6/19: Patient was on hold with PT due to continued pain. Had injection in R groin 5/13 which did seem to help with groin pain. Still having some lateral hip on the R and occasional discomfort in left groin. Denies low back pain. C/o gait abnormality. Would like to be getting to the gym.     Mechanism of injury: Angelina Yanes is a 65 y.o. female who presents to outpatient Physical Therapy today with complaints of right groin pain. Previously sent to PT from sports medicine to attempt therapy on hip, was determined more of low back and was then referred to pain management. Injection in low back a few weeks ago with minimal changes. Was then sent for water therapy, but had difficulty attending due to schedule conflicts. Currently CC of right groin pain that increase with sit to stand transfers, leading with RLE up steps.       Patient Goals  Patient goals for therapy: decreased pain  Patient goal: increased energy, strengthen core, daily chores without pain or requiring assistance, walk her dog  Pain      Current pain ratin  2  At best pain ratin  At worst pain ratin  5  Location: R groin  Quality: sharp  Alleviating factors: walking.  Exacerbated by: transfers.    Social Support  Steps to enter house: yes  Stairs in house: yes   Lives in: multiple-level home    Employment status: working ()        Objective     Concurrent Complaints  Negative for bladder dysfunction, bowel dysfunction and saddle (S4) numbness    Neurological Testing     Sensation     Lumbar   Left   Intact: light touch    Right   Intact: light touch    Reflexes   Left   Babinski sign: negative    Right   Babinski sign: negative    Active Range of Motion     Lumbar   Flexion:  WFL  Extension:  Restriction level: maximal  : moderate restriction  Left lateral flexion:  WFL  Right lateral flexion:  WFL  Left rotation:  Restriction level: minimal  : WNL  Right rotation:  Restriction level: minimal  : WNL    Left Hip   Extension: 0 degrees   External rotation (90/90): 15 degrees   Internal rotation (90/90): 30 degrees     Right Hip   Extension: 0 degrees   External rotation (90/90): 20 degrees   Internal rotation (90/90): 15 degrees   Mechanical Assessment    Cervical      Thoracic      Lumbar    Lying extension: repeated movements    Strength/Myotome Testing     Left Hip      Planes of Motion   Flexion: 4   4  Extension: 3+   3-  Abduction: NT   4-  Adduction: 5  External rotation: 5  Internal rotation: 5    Isolated Muscles   Gluteus tito: 3-  2+    Right Hip   Planes of Motion   Flexion: 2+   4-  Extension: 3-   3-  Abduction: NT   4+  Adduction: 5  External rotation: 4  4  Internal rotation: 4  4    Isolated Muscles   Gluteus maximums: 2+ 2+    Left Knee   Flexion: 5  Extension: 5    Right Knee   Flexion:  "5  Extension: 4+   4+    Left Ankle/Foot   Dorsiflexion: 5    Right Ankle/Foot   Dorsiflexion: 5    Muscle Activation     Additional Muscle Activation Details  Activation of TrA with resisted SLR R= trace L=min  6/19: R= mod L=min    Tests     Lumbar     Left   Negative passive SLR.     Right   Negative passive SLR and slump test.     Right Pelvic Girdle/Sacrum   Positive: active SLR test.     Additional Tests Details  Hamstring 90/90 SLR R=(50) L=(40)  (-) supine to sit test  6/19: (-)  No change in symptoms with manual lumbar traction    (+) scour  6/19: (-) bilat    (+) cassie  6/19: (-)    (+) ely's  6/19:(-)bilat    General Comments:      Lumbar Comments  Repeated PPU: no change    Gait: forward trunk lean, absent hip or lumbar extension. Antalgic  6/19: Excessive pelvic rotation with minimal hip extension bilaterally    FOTO: 33% function, 49% predicted function   6/19: 44%           Precautions: none noted   Access code: verbal  Progress note: 7/19  POC: 9/19    Manuals 4/24 4/29 5/6 6/19    Lumbar PA mobs Grade II-IV Grade II-IV Grade II-IV *    R hip stretching nv nv Quad, adductors     Hip mobs  nv Long axis distraction grade II-IV             Neuro Re-Ed        Standing UBE for core stablity    *    Core brace    *    GS  25x 20x     Knee fall out        Supine march        SLR with core brace        Bird dog                Posture control with towel roll Patient ed       Ther Ex        Nustep  With towel roll L1 10 min With towel roll L1 10 min     Standing lumbar extension 10x 10x // bars 10x // bars Reviewed    PPU 10x 10x 10x 10    COURTNEY NV    Bridge * 10x 10x 10x    Prone lay * 5 min with MHP Progressive with MHP 3 stages 2 min     Prone knee flexion * 20x 20x *            LTR        Quadruped LTR        Supine ball squeeze        Supine clamshell                                Hip flexor stretch * At stairs :15x4 :15x4 8\" *            Ther Activity        Step ups        Sit to stand        Gait " Training                        Modalities

## 2025-06-27 ENCOUNTER — OFFICE VISIT (OUTPATIENT)
Dept: PHYSICAL THERAPY | Facility: CLINIC | Age: 65
End: 2025-06-27
Attending: NURSE PRACTITIONER
Payer: COMMERCIAL

## 2025-06-27 DIAGNOSIS — M51.16 LUMBAR DISC DISEASE WITH RADICULOPATHY: ICD-10-CM

## 2025-06-27 DIAGNOSIS — G89.4 CHRONIC PAIN SYNDROME: Primary | ICD-10-CM

## 2025-06-27 DIAGNOSIS — M25.551 RIGHT HIP PAIN: ICD-10-CM

## 2025-06-27 PROCEDURE — 97110 THERAPEUTIC EXERCISES: CPT | Performed by: PHYSICAL THERAPIST

## 2025-06-27 PROCEDURE — 97140 MANUAL THERAPY 1/> REGIONS: CPT | Performed by: PHYSICAL THERAPIST

## 2025-06-27 PROCEDURE — 97112 NEUROMUSCULAR REEDUCATION: CPT | Performed by: PHYSICAL THERAPIST

## 2025-06-27 NOTE — PROGRESS NOTES
Daily Note     Today's date: 2025  Patient name: Angelina Yanes  : 1960  MRN: [id number]  Referring provider: Kocher, Barbara, CRNP  Dx:   Encounter Diagnosis     ICD-10-CM    1. Chronic pain syndrome  G89.4       2. Lumbar disc disease with radiculopathy  M51.16       3. Right hip pain  M25.551           Start Time: 08  Stop Time: 925  Total time in clinic (min): 55 minutes    Subjective: States pain isn't as bad in groin, about a 5/10. States it is uncomfortable to stand for longer periods.       Objective: See treatment diary below      Assessment: Tolerated treatment well. Noted right side shift today, attempted repeated L lumbar shifts. Re-tested walk and patient displayed improved upright posture with decreased pain in groin. Also added side shift into prone press up which patient responded well to. Improved upright posture when leaving the clinic today. Patient exhibited good technique with therapeutic exercises and would benefit from continued PT      Plan: Continue per plan of care.  Progress treatment as tolerated.       Precautions: none noted   Access code: verbal  Progress note:   POC:     Manuals    Lumbar PA mobs Grade II-IV Grade II-IV Grade II-IV * Grade II-IV    R hip stretching nv nv Quad, adductors  Quad, adductors bilat   Hip mobs  nv Long axis distraction grade II-IV  Long axis distraction grade II-IV           Neuro Re-Ed        Standing UBE for core stablity     Standing alt 6 min   Core brace     :05x10   GS  25x 20x  20x   Knee fall out        Supine march        SLR with core brace        Bird dog                Posture control with towel roll Patient ed       Ther Ex        Nustep  With towel roll L1 10 min With towel roll L1 10 min     Standing lumbar extension 10x 10x // bars 10x // bars Reviewed 10x at table   Left side glide     2x10 at cabinets   PPU 10x 10x 10x 10    COURTNEY NV PPU in left side bend 10x   Bridge * 10x 10x 10x 10x   Prone  "lay * 5 min with MHP Progressive with MHP 3 stages 2 min     Prone knee flexion * 20x 20x *            LTR        Quadruped LTR        Supine ball squeeze        Supine clamshell                                Hip flexor stretch * At stairs :15x4 :15x4 8\" *            Ther Activity        Step ups        Sit to stand        Gait Training                        Modalities                               "

## 2025-07-02 ENCOUNTER — OFFICE VISIT (OUTPATIENT)
Age: 65
End: 2025-07-02
Payer: COMMERCIAL

## 2025-07-02 ENCOUNTER — OFFICE VISIT (OUTPATIENT)
Dept: PHYSICAL THERAPY | Facility: CLINIC | Age: 65
End: 2025-07-02
Attending: NURSE PRACTITIONER
Payer: COMMERCIAL

## 2025-07-02 VITALS — WEIGHT: 150.2 LBS | BODY MASS INDEX: 26.61 KG/M2 | HEIGHT: 63 IN

## 2025-07-02 DIAGNOSIS — G89.4 CHRONIC PAIN SYNDROME: Primary | ICD-10-CM

## 2025-07-02 DIAGNOSIS — M25.551 RIGHT HIP PAIN: ICD-10-CM

## 2025-07-02 DIAGNOSIS — M70.62 TROCHANTERIC BURSITIS OF BOTH HIPS: ICD-10-CM

## 2025-07-02 DIAGNOSIS — M51.16 LUMBAR DISC DISEASE WITH RADICULOPATHY: ICD-10-CM

## 2025-07-02 DIAGNOSIS — M25.552 BILATERAL HIP PAIN: ICD-10-CM

## 2025-07-02 DIAGNOSIS — M25.551 BILATERAL HIP PAIN: ICD-10-CM

## 2025-07-02 DIAGNOSIS — M70.61 TROCHANTERIC BURSITIS OF BOTH HIPS: ICD-10-CM

## 2025-07-02 PROCEDURE — 97112 NEUROMUSCULAR REEDUCATION: CPT | Performed by: PHYSICAL THERAPIST

## 2025-07-02 PROCEDURE — 97140 MANUAL THERAPY 1/> REGIONS: CPT | Performed by: PHYSICAL THERAPIST

## 2025-07-02 PROCEDURE — 99214 OFFICE O/P EST MOD 30 MIN: CPT | Performed by: NURSE PRACTITIONER

## 2025-07-02 PROCEDURE — 97110 THERAPEUTIC EXERCISES: CPT | Performed by: PHYSICAL THERAPIST

## 2025-07-02 NOTE — ASSESSMENT & PLAN NOTE
While the patient was in the office today, I did have a thorough conversation regarding their chronic pain syndrome, medication management, and treatment plan options.  Patient is being seen for follow-up visit.  She returns the office complaining of increasing pain on the lateral aspect of both hips.  On exam, pain is consistent with bilateral greater trochanteric bursitis.  As such, she will be scheduled for ultrasound-guided bilateral greater trochanteric bursa injections.    Previous treatments include L4-5 interlaminar epidural steroid injection performed on 4/11/2025 which failed to provide her with relief.  She was experiencing significant right groin pain which was relieved by 90% with a hip joint injection performed on 5/13/2025.    She uses ibuprofen 600 mg as needed which provides her with up to 50% improvement in her pain.  She denies side effects from ibuprofen.    Follow-up 1 month after injection.

## 2025-07-02 NOTE — PROGRESS NOTES
Name: Angelina Yanes      : 1960      MRN: [id number]  Encounter Provider: Barbara Kocher, CRNP  Encounter Date: 2025   Encounter department: [facility] [place]  :  Assessment & Plan  Chronic pain syndrome  Bilateral hip pain  Trochanteric bursitis of both hips    While the patient was in the office today, I did have a thorough conversation regarding their chronic pain syndrome, medication management, and treatment plan options.  Patient is being seen for follow-up visit.  She returns the office complaining of increasing pain on the lateral aspect of both hips.  On exam, pain is consistent with bilateral greater trochanteric bursitis.  As such, she will be scheduled for ultrasound-guided bilateral greater trochanteric bursa injections.    Previous treatments include L4-5 interlaminar epidural steroid injection performed on 2025 which failed to provide her with relief.  She was experiencing significant right groin pain which was relieved by 90% with a hip joint injection performed on 2025.    She uses ibuprofen 600 mg as needed which provides her with up to 50% improvement in her pain.  She denies side effects from ibuprofen.    Follow-up 1 month after injection.      My impressions and treatment recommendations were discussed in detail with the patient who verbalized understanding and had no further questions.  Discharge instructions were provided. I personally saw and examined the patient and I agree with the above discussed plan of care.    History of Present Illness     Angelina Yanes is a 65 y.o. female who presents for a follow up office visit in regards to Groin Pain. The patient’s current symptoms include complaints of pain on the lateral aspect of both hips.  Pain is worsened by lying on her sides.  Current pain level is a 5/10.  Quality of pain is described as sharp.  She uses ibuprofen 600 mg as needed which reduces her pain by about 50%.  She denies side  "effects from medications.      Review of Systems   Constitutional:  Negative for unexpected weight change.   HENT:  Negative for hearing loss.    Eyes:  Negative for visual disturbance.   Respiratory:  Negative for shortness of breath.    Cardiovascular:  Negative for leg swelling.   Gastrointestinal:  Negative for constipation.   Endocrine: Negative for polyuria.   Genitourinary:  Negative for difficulty urinating.   Musculoskeletal:  Positive for gait problem and myalgias. Negative for joint swelling.   Skin:  Negative for rash.   Neurological:  Negative for weakness and headaches.   Psychiatric/Behavioral:  Negative for decreased concentration.    All other systems reviewed and are negative.      Medical History Reviewed by provider this encounter:     .  Medications Ordered Prior to Encounter[1]      Objective   Ht 5' 3\" (1.6 m)   Wt 68.1 kg (150 lb 3.2 oz)   BMI 26.61 kg/m²      Pain Score:   5  Physical Exam  Constitutional: normal, well developed, well nourished, alert, in no distress and non-toxic and no overt pain behavior.  Eyes: anicteric  HEENT: grossly intact  Neck: supple, symmetric, trachea midline and no masses   Pulmonary: even and unlabored  Cardiovascular: No edema or pitting edema present  Skin: Normal without rashes or lesions and well hydrated  Psychiatric: Mood and affect appropriate  Neurologic: Cranial Nerves II-XII grossly intact  Musculoskeletal: Tenderness over bilateral greater trochanteric bursa.             [1]   Current Outpatient Medications on File Prior to Visit   Medication Sig Dispense Refill    ascorbic acid (VITAMIN C) 500 MG tablet Take 500 mg by mouth in the morning.      calcium citrate-vitamin D 315 mg-5 mcg tablet Take 1 tablet by mouth in the morning and 1 tablet in the evening.      DULoxetine (CYMBALTA) 30 mg delayed release capsule Take 1 capsule (30 mg total) by mouth in the morning 30 capsule 2    Garlic 400 MG TBEC Take by mouth      levothyroxine 50 mcg tablet " Take 1 tablet (50 mcg total) by mouth daily 90 tablet 1    lisinopril (ZESTRIL) 10 mg tablet take 1 tablet by mouth once daily 90 tablet 1    Multiple Vitamins-Minerals (CENTRUM SILVER WOMEN 50+ PO)       ofloxacin (OCUFLOX) 0.3 % ophthalmic solution Apply 2 drops in the left eye every 2 hours while awake for 2 days, THEN 2 drops in the left eye 4 times daily for 5 days 5 mL 0    Restasis 0.05 % ophthalmic emulsion       vitamin E, tocopherol, 1,000 units capsule Take 1,000 Units by mouth in the morning.       No current facility-administered medications on file prior to visit.

## 2025-07-02 NOTE — PROGRESS NOTES
Daily Note     Today's date: 2025  Patient name: Angelina Yanes  : 1960  MRN: [id number]  Referring provider: Kocher, Barbara, CRNP  Dx:   Encounter Diagnosis     ICD-10-CM    1. Chronic pain syndrome  G89.4       2. Lumbar disc disease with radiculopathy  M51.16       3. Right hip pain  M25.551           Start Time: 0900  Stop Time: 09  Total time in clinic (min): 45 minutes    Subjective: Patient states she was seen by pain management who thinks she may have some lateral hip bursitis impacting pain levels. Will be getting injections. Overall patient states consistency with HEP, but would like to review technique today.       Objective: See treatment diary below      Assessment: Tolerated treatment well. Noted limitation in active extension range of motion during ambulation causing excessive pelvic rotational movement in transverse plane likely contributing to hip tightness anteriorly. Reviewed HEP today and instructed to continue with lateral flexion exercises as well as emphasizing hip extension in gait. Patient exhibited good technique with therapeutic exercises and would benefit from continued PT      Plan: Continue per plan of care.  Progress treatment as tolerated.       Precautions: none noted   Access code: verbal  Progress note:   POC:     Manuals    Lumbar PA mobs Grade II-IV  Grade II-IV Grade II-IV * Grade II-IV    R hip stretching Quad, adductors bilat nv Quad, adductors  Quad, adductors bilat   Hip mobs Long axis distraction grade II-IV nv Long axis distraction grade II-IV  Long axis distraction grade II-IV           Neuro Re-Ed        Standing UBE for core stablity     Standing alt 6 min   Core brace :05x10    :05x10   GS 20x 25x 20x  20x   Knee fall out        Supine march        SLR with core brace        Bird dog                Posture control with towel roll        Ther Ex        Nustep With towel roll L1 10 min With towel roll L1 10 min With towel  "roll L1 10 min     Standing lumbar extension  10x // bars 10x // bars Reviewed 10x at table   Left side glide 2x10 at cabinets    2x10 at cabinets   PPU  10x 10x 10    COURTNEY NV PPU in left side bend 10x   Bridge :05x10 10x 10x 10x 10x   Prone lay  5 min with MHP Progressive with MHP 3 stages 2 min     Prone knee flexion 10x 20x 20x *            LTR        Quadruped LTR        Supine ball squeeze        clamshell 15x side lying                               Hip flexor stretch  At stairs :15x4 :15x4 8\" *            Ther Activity        Step ups        Sit to stand        Gait Training                        Modalities                                 "

## 2025-07-09 ENCOUNTER — OFFICE VISIT (OUTPATIENT)
Dept: PHYSICAL THERAPY | Facility: CLINIC | Age: 65
End: 2025-07-09
Attending: NURSE PRACTITIONER
Payer: COMMERCIAL

## 2025-07-09 DIAGNOSIS — G89.4 CHRONIC PAIN SYNDROME: Primary | ICD-10-CM

## 2025-07-09 DIAGNOSIS — M51.16 LUMBAR DISC DISEASE WITH RADICULOPATHY: ICD-10-CM

## 2025-07-09 DIAGNOSIS — M25.551 RIGHT HIP PAIN: ICD-10-CM

## 2025-07-09 PROCEDURE — 97110 THERAPEUTIC EXERCISES: CPT | Performed by: PHYSICAL THERAPIST

## 2025-07-09 PROCEDURE — 97140 MANUAL THERAPY 1/> REGIONS: CPT | Performed by: PHYSICAL THERAPIST

## 2025-07-09 PROCEDURE — 97112 NEUROMUSCULAR REEDUCATION: CPT | Performed by: PHYSICAL THERAPIST

## 2025-07-09 NOTE — PROGRESS NOTES
Daily Note     Today's date: 2025  Patient name: Angelina Yanes  : 1960  MRN: [id number]  Referring provider: Kocher, Barbara, CRNP  Dx:   Encounter Diagnosis     ICD-10-CM    1. Chronic pain syndrome  G89.4       2. Lumbar disc disease with radiculopathy  M51.16       3. Right hip pain  M25.551           Start Time: 0900  Stop Time: 0945  Total time in clinic (min): 45 minutes    Subjective: Patient states continued bilateral hip pain with groin pain. Has been consistent with side glide which does seem to help.       Objective: See treatment diary below      Assessment: Tolerated treatment well. Continues to display improved upright posture post lateral shift. Noted continued forward trunk bend and limited extension in bilateral hips causing anterior hip mm tightness. Patient exhibited good technique with therapeutic exercises and would benefit from continued PT      Plan: Continue per plan of care.  Progress treatment as tolerated.       Precautions: none noted   Access code: verbal  Progress note:   POC:     Manuals    Lumbar PA mobs Grade II-IV  Grade II-IV  Grade II-IV * Grade II-IV    R hip stretching Quad, adductors bilat  Quad, adductors  Quad, adductors bilat   Hip mobs Long axis distraction grade II-IV Long axis distraction grade II-IV Long axis distraction grade II-IV  Long axis distraction grade II-IV           Neuro Re-Ed        Standing UBE for core stablity     Standing alt 6 min   Core brace :05x10 :05x10   :05x10   GS 20x 20x 20x  20x   Knee fall out        Supine  with core brace        Bird dog                Posture control with towel roll        Ther Ex        Nustep With towel roll L1 10 min With towel roll L1 10 min With towel roll L1 10 min     Standing lumbar extension   10x // bars Reviewed 10x at table   Left side glide 2x10 at cabinets 2x10 at cabinets   2x10 at cabinets   PPU   10x 10    COURTNEY NV PPU in left side bend 10x   Bridge  ":05x10 :05x10 10x 10x 10x   Prone lay   Progressive with MHP 3 stages 2 min     Prone knee flexion 10x 10x 20x *            LTR        Quadruped LTR        Supine ball squeeze        clamshell 15x side lying 15x side lying                              Hip flexor stretch  :15x4 8\" :15x4 8\" *            Ther Activity        Step ups        Sit to stand        Gait Training                        Modalities                                   "

## 2025-07-11 DIAGNOSIS — E03.9 HYPOTHYROIDISM, UNSPECIFIED TYPE: ICD-10-CM

## 2025-07-11 NOTE — TELEPHONE ENCOUNTER
Pharmacy change this refill  Reason for call:   [x] Refill   [] Prior Auth  [] Other:     Office:   [x] PCP/Provider - Lorraine Sim  [] Specialty/Provider -     Medication: Levothyroxine    Dose/Frequency: 50 mcg Daily     Quantity: 90     Pharmacy: [facility] [place] [address]    Local Pharmacy   Does the patient have enough for 3 days?   [x] Yes   [] No - Send as HP to POD    Mail Away Pharmacy   Does the patient have enough for 10 days?   [] Yes   [] No - Send as HP to POD

## 2025-07-12 RX ORDER — LEVOTHYROXINE SODIUM 50 UG/1
50 TABLET ORAL DAILY
Qty: 90 TABLET | Refills: 1 | Status: SHIPPED | OUTPATIENT
Start: 2025-07-12

## 2025-07-16 ENCOUNTER — EVALUATION (OUTPATIENT)
Dept: PHYSICAL THERAPY | Facility: CLINIC | Age: 65
End: 2025-07-16
Attending: NURSE PRACTITIONER
Payer: COMMERCIAL

## 2025-07-16 DIAGNOSIS — M51.16 LUMBAR DISC DISEASE WITH RADICULOPATHY: ICD-10-CM

## 2025-07-16 DIAGNOSIS — M25.551 RIGHT HIP PAIN: ICD-10-CM

## 2025-07-16 DIAGNOSIS — G89.4 CHRONIC PAIN SYNDROME: Primary | ICD-10-CM

## 2025-07-16 PROCEDURE — 97164 PT RE-EVAL EST PLAN CARE: CPT | Performed by: PHYSICAL THERAPIST

## 2025-07-16 PROCEDURE — 97110 THERAPEUTIC EXERCISES: CPT | Performed by: PHYSICAL THERAPIST

## 2025-07-16 PROCEDURE — 97140 MANUAL THERAPY 1/> REGIONS: CPT | Performed by: PHYSICAL THERAPIST

## 2025-07-16 NOTE — PROGRESS NOTES
PT Re-Evaluation  and PT Discharge    Today's date: 2025  Patient name: Angelina Yanes  : 1960  MRN: [id number]  Referring provider: Kocher, Barbara, CRNP  Dx:   Encounter Diagnosis     ICD-10-CM    1. Chronic pain syndrome  G89.4       2. Lumbar disc disease with radiculopathy  M51.16       3. Right hip pain  M25.551           Start Time: 0900  Stop Time: 0945  Total time in clinic (min): 45 minutes    Assessment      Assessment details: Angelina Yanes was seen for an initial PT evaluation and 7 f/u sessions. Patient is a 65 y.o. female with diagnosis of lumbar radiculopathy and past medical history significant for appendectomy, HTN, hypothyroidism. FOTO functional score shows improvement from 33% at intake to 71% today surpassing predicted value of 44%. Examination today shows patient has met the majority of goals set at IE and is independent with her home exercise program. No longer requires skilled PT guidance and may continue to work on areas of continued deficit including glut and hip weakness independently. Findings and HEP was reviewed with patient today and will be discharged from care.         Goals  STG (6 weeks)  1. Patient will have reported 0/10 pain in R hip/LB at rest. - MET   2. Improve patient's lumbar extension to 10 degrees for increased ability to take proper strides during ambulation. - MET   3. Increase patient's bilateral single leg balance to 3 seconds for increased stability on stairs. - NT  LTG (12 weeks)  1. Patient's LE strength will be equal bilaterally for ability to ambulate and return to functional activities at [facility]. - PROGRESSING  2. Patient will be able to complete sit to stand transfer with 0/10 pain in right groin. - MET   3. Patient will be independent with home exercise program for continued maintenance post PT discharge. - MET       Plan  Plan of Care beginning date: 2025    Treatment plan discussed with: patient and PTA  Plan details:  DC PT        Subjective Evaluation    History of Present Illness  Subjective : States 80% improvement since the start of PT. Has not felt a lot of pain this week in groin or lateral hip. States walking is still off, but generally pain free and has walked this way for awhile. Is feeling good overall and would like to attempt DC with HEP.    Subjective : Patient was on hold with PT due to continued pain. Had injection in R groin  which did seem to help with groin pain. Still having some lateral hip on the R and occasional discomfort in left groin. Denies low back pain. C/o gait abnormality. Would like to be getting to the gym.     Mechanism of injury: Angelina Yanes is a 65 y.o. female who presents to outpatient Physical Therapy today with complaints of right groin pain. Previously sent to PT from sports medicine to attempt therapy on hip, was determined more of low back and was then referred to pain management. Injection in low back a few weeks ago with minimal changes. Was then sent for water therapy, but had difficulty attending due to schedule conflicts. Currently CC of right groin pain that increase with sit to stand transfers, leading with RLE up steps.      Patient Goals  Patient goals for therapy: decreased pain  Patient goal: increased energy, strengthen core, daily chores without pain or requiring assistance, walk her dog  Pain      Current pain ratin  2 0  At best pain ratin  At worst pain ratin  5 4  Location: R groin  Quality: sharp  Alleviating factors: walking.  Exacerbated by: transfers.    Social Support  Steps to enter house: yes  Stairs in house: yes   Lives in: multiple-level home    Employment status: working ()        Objective     Concurrent Complaints  Negative for bladder dysfunction, bowel dysfunction and saddle (S4) numbness    Neurological Testing     Sensation     Lumbar   Left   Intact: light touch    Right   Intact: light  touch    Reflexes   Left   Babinski sign: negative    Right   Babinski sign: negative    Active Range of Motion     Lumbar   Flexion:  WFL  Extension:  Restriction level: maximal  6/19: moderate restriction  7/16: min-mod restriction  Left lateral flexion:  WFL  Right lateral flexion:  WFL  Left rotation:  Restriction level: minimal  6/19: WNL  Right rotation:  Restriction level: minimal  6/19: WNL    Left Hip   Extension: 0 degrees   External rotation (90/90): 15 degrees   Internal rotation (90/90): 30 degrees     Right Hip   Extension: 0 degrees   External rotation (90/90): 20 degrees   Internal rotation (90/90): 15 degrees       Strength/Myotome Testing     Left Hip    6/19 7/16  Planes of Motion   Flexion: 4   4 4+  Extension: 3+   3- 3-  Abduction: NT   4- 4+  Adduction: 5  External rotation: 5  Internal rotation: 5    Isolated Muscles   Gluteus tito: 3-  2+ 2+    Right Hip   Planes of Motion   Flexion: 2+   4- 4+  Extension: 3-   3- 3-  Abduction: NT   4+ 4+  Adduction: 5  External rotation: 4  4 5  Internal rotation: 4  4 5    Isolated Muscles   Gluteus maximums: 2+ 2+ 2+    Left Knee   Flexion: 5  Extension: 5    Right Knee   Flexion: 5  Extension: 4+   4+ 5    Left Ankle/Foot   Dorsiflexion: 5    Right Ankle/Foot   Dorsiflexion: 5    Muscle Activation     Additional Muscle Activation Details  Activation of TrA with resisted SLR R= trace L=min  6/19: R= mod L=min  7/16: mod bilateraly    Tests     Lumbar     Left   Negative passive SLR.     Right   Negative passive SLR and slump test.     Right Pelvic Girdle/Sacrum   Positive: active SLR test.   7/16: (-)    Additional Tests Details  Hamstring 90/90 SLR R=(50) L=(40)  (-) supine to sit test  6/19: (-)  No change in symptoms with manual lumbar traction    (+) scour  6/19: (-) bilat    (+) cassie  6/19: (-)    (+) ely's  6/19:(-)bilat    General Comments:      Lumbar Comments  Repeated PPU: no change    Gait: forward trunk lean, absent hip or lumbar  "extension. Antalgic  6/19: Excessive pelvic rotation with minimal hip extension bilaterally      FOTO: 33% function, 49% predicted function   6/19: 44%  7/16: 71%             Precautions: none noted   Access code: verbal      Manuals 7/2 7/9 7/16 6/19 6/27   Lumbar PA mobs Grade II-IV  Grade II-IV  Grade II-IV * Grade II-IV    R hip stretching Quad, adductors bilat    Quad, adductors bilat   Hip mobs Long axis distraction grade II-IV Long axis distraction grade II-IV Long axis distraction grade II-IV  Long axis distraction grade II-IV           Neuro Re-Ed   HEP reviewed     Standing UBE for core stablity     Standing alt 6 min   Core brace :05x10 :05x10   :05x10   GS 20x 20x   20x   Knee fall out        Supine march        SLR with core brace        Bird dog                Posture control with towel roll        Ther Ex        Nustep With towel roll L1 10 min With towel roll L1 10 min With towel roll L1 10 min     Standing lumbar extension    Reviewed 10x at table   Left side glide 2x10 at cabinets 2x10 at cabinets   2x10 at cabinets   PPU    10    COURTNEY NV PPU in left side bend 10x   Bridge :05x10 :05x10  10x 10x   Prone lay        Prone knee flexion 10x 10x  *            LTR        Quadruped LTR        Supine ball squeeze        clamshell 15x side lying 15x side lying                              Hip flexor stretch  :15x4 8\"  *            Ther Activity        Step ups        Sit to stand        Gait Training                        Modalities                               "

## 2025-08-06 ENCOUNTER — OFFICE VISIT (OUTPATIENT)
Dept: URGENT CARE | Facility: CLINIC | Age: 65
End: 2025-08-06
Payer: COMMERCIAL

## 2025-08-06 VITALS
WEIGHT: 163.2 LBS | DIASTOLIC BLOOD PRESSURE: 79 MMHG | SYSTOLIC BLOOD PRESSURE: 126 MMHG | BODY MASS INDEX: 28.91 KG/M2 | RESPIRATION RATE: 16 BRPM | HEART RATE: 95 BPM | TEMPERATURE: 98.2 F | OXYGEN SATURATION: 99 %

## 2025-08-06 DIAGNOSIS — S61.432A PUNCTURE WOUND OF LEFT HAND WITHOUT FOREIGN BODY, INITIAL ENCOUNTER: Primary | ICD-10-CM

## 2025-08-06 PROCEDURE — 99213 OFFICE O/P EST LOW 20 MIN: CPT | Performed by: NURSE PRACTITIONER
